# Patient Record
Sex: MALE | Race: WHITE | NOT HISPANIC OR LATINO
[De-identification: names, ages, dates, MRNs, and addresses within clinical notes are randomized per-mention and may not be internally consistent; named-entity substitution may affect disease eponyms.]

---

## 2018-12-31 ENCOUNTER — APPOINTMENT (OUTPATIENT)
Dept: PULMONOLOGY | Facility: CLINIC | Age: 53
End: 2018-12-31
Payer: MEDICAID

## 2018-12-31 ENCOUNTER — NON-APPOINTMENT (OUTPATIENT)
Age: 53
End: 2018-12-31

## 2018-12-31 VITALS
OXYGEN SATURATION: 98 % | WEIGHT: 305 LBS | HEART RATE: 70 BPM | TEMPERATURE: 98.4 F | DIASTOLIC BLOOD PRESSURE: 110 MMHG | SYSTOLIC BLOOD PRESSURE: 180 MMHG | HEIGHT: 69 IN | BODY MASS INDEX: 45.18 KG/M2

## 2018-12-31 PROBLEM — Z00.00 ENCOUNTER FOR PREVENTIVE HEALTH EXAMINATION: Status: ACTIVE | Noted: 2018-12-31

## 2018-12-31 PROCEDURE — 99205 OFFICE O/P NEW HI 60 MIN: CPT | Mod: 25

## 2018-12-31 PROCEDURE — 94010 BREATHING CAPACITY TEST: CPT

## 2018-12-31 PROCEDURE — 36415 COLL VENOUS BLD VENIPUNCTURE: CPT

## 2019-01-02 ENCOUNTER — NON-APPOINTMENT (OUTPATIENT)
Age: 54
End: 2019-01-02

## 2019-01-02 ENCOUNTER — APPOINTMENT (OUTPATIENT)
Dept: CARDIOLOGY | Facility: CLINIC | Age: 54
End: 2019-01-02
Payer: MEDICAID

## 2019-01-02 VITALS
DIASTOLIC BLOOD PRESSURE: 118 MMHG | BODY MASS INDEX: 46.65 KG/M2 | HEIGHT: 69 IN | OXYGEN SATURATION: 97 % | WEIGHT: 315 LBS | SYSTOLIC BLOOD PRESSURE: 182 MMHG | HEART RATE: 77 BPM

## 2019-01-02 VITALS — SYSTOLIC BLOOD PRESSURE: 157 MMHG | DIASTOLIC BLOOD PRESSURE: 118 MMHG

## 2019-01-02 DIAGNOSIS — Z56.0 UNEMPLOYMENT, UNSPECIFIED: ICD-10-CM

## 2019-01-02 DIAGNOSIS — Z86.39 PERSONAL HISTORY OF OTHER ENDOCRINE, NUTRITIONAL AND METABOLIC DISEASE: ICD-10-CM

## 2019-01-02 DIAGNOSIS — Z80.49 FAMILY HISTORY OF MALIGNANT NEOPLASM OF OTHER GENITAL ORGANS: ICD-10-CM

## 2019-01-02 DIAGNOSIS — F17.200 NICOTINE DEPENDENCE, UNSPECIFIED, UNCOMPLICATED: ICD-10-CM

## 2019-01-02 DIAGNOSIS — Z86.79 PERSONAL HISTORY OF OTHER DISEASES OF THE CIRCULATORY SYSTEM: ICD-10-CM

## 2019-01-02 LAB
ALBUMIN SERPL ELPH-MCNC: 4.2 G/DL
ALP BLD-CCNC: 131 U/L
ALT SERPL-CCNC: 25 U/L
ANION GAP SERPL CALC-SCNC: 11 MMOL/L
AST SERPL-CCNC: 23 U/L
BASOPHILS # BLD AUTO: 0.02 K/UL
BASOPHILS NFR BLD AUTO: 0.2 %
BILIRUB SERPL-MCNC: 0.9 MG/DL
BUN SERPL-MCNC: 15 MG/DL
CALCIUM SERPL-MCNC: 9.2 MG/DL
CHLORIDE SERPL-SCNC: 100 MMOL/L
CO2 SERPL-SCNC: 28 MMOL/L
CREAT SERPL-MCNC: 1.17 MG/DL
CRP SERPL-MCNC: 1.74 MG/DL
EOSINOPHIL # BLD AUTO: 0.26 K/UL
EOSINOPHIL NFR BLD AUTO: 2.1 %
GLUCOSE SERPL-MCNC: 199 MG/DL
HCT VFR BLD CALC: 42.8 %
HGB BLD-MCNC: 13.8 G/DL
IMM GRANULOCYTES NFR BLD AUTO: 0.4 %
LYMPHOCYTES # BLD AUTO: 1.53 K/UL
LYMPHOCYTES NFR BLD AUTO: 12.6 %
MAN DIFF?: NORMAL
MCHC RBC-ENTMCNC: 26.1 PG
MCHC RBC-ENTMCNC: 32.2 GM/DL
MCV RBC AUTO: 80.9 FL
MONOCYTES # BLD AUTO: 0.76 K/UL
MONOCYTES NFR BLD AUTO: 6.3 %
NEUTROPHILS # BLD AUTO: 9.5 K/UL
NEUTROPHILS NFR BLD AUTO: 78.4 %
NT-PROBNP SERPL-MCNC: 1332 PG/ML
PLATELET # BLD AUTO: 293 K/UL
POTASSIUM SERPL-SCNC: 4.3 MMOL/L
PROT SERPL-MCNC: 7.2 G/DL
RBC # BLD: 5.29 M/UL
RBC # FLD: 15.2 %
SODIUM SERPL-SCNC: 139 MMOL/L
WBC # FLD AUTO: 12.12 K/UL

## 2019-01-02 PROCEDURE — 93000 ELECTROCARDIOGRAM COMPLETE: CPT

## 2019-01-02 PROCEDURE — 99204 OFFICE O/P NEW MOD 45 MIN: CPT | Mod: 25

## 2019-01-02 RX ORDER — FUROSEMIDE 40 MG/1
40 TABLET ORAL DAILY
Qty: 30 | Refills: 6 | Status: ACTIVE | COMMUNITY
Start: 2019-01-02 | End: 1900-01-01

## 2019-01-02 RX ORDER — ATORVASTATIN CALCIUM 40 MG/1
40 TABLET, FILM COATED ORAL
Qty: 30 | Refills: 6 | Status: ACTIVE | COMMUNITY
Start: 2019-01-02 | End: 1900-01-01

## 2019-01-02 RX ORDER — HYDROCHLOROTHIAZIDE 12.5 MG/1
12.5 TABLET ORAL DAILY
Qty: 30 | Refills: 3 | Status: DISCONTINUED | COMMUNITY
Start: 2018-12-31 | End: 2019-01-02

## 2019-01-02 SDOH — ECONOMIC STABILITY - INCOME SECURITY: UNEMPLOYMENT, UNSPECIFIED: Z56.0

## 2019-01-02 NOTE — PHYSICAL EXAM
[General Appearance - Well Developed] : well developed [Normal Appearance] : normal appearance [Well Groomed] : well groomed [General Appearance - Well Nourished] : well nourished [General Appearance - In No Acute Distress] : no acute distress [Normal Conjunctiva] : the conjunctiva exhibited no abnormalities [Eyelids - No Xanthelasma] : the eyelids demonstrated no xanthelasmas [Normal Oral Mucosa] : normal oral mucosa [No Oral Pallor] : no oral pallor [No Oral Cyanosis] : no oral cyanosis [Heart Rate And Rhythm] : heart rate and rhythm were normal [Heart Sounds] : normal S1 and S2 [Murmurs] : no murmurs present [Respiration, Rhythm And Depth] : normal respiratory rhythm and effort [Exaggerated Use Of Accessory Muscles For Inspiration] : no accessory muscle use [Auscultation Breath Sounds / Voice Sounds] : lungs were clear to auscultation bilaterally [Bowel Sounds] : normal bowel sounds [Abdomen Soft] : soft [Abdomen Tenderness] : non-tender [Abnormal Walk] : normal gait [Gait - Sufficient For Exercise Testing] : the gait was sufficient for exercise testing [Nail Clubbing] : no clubbing of the fingernails [Cyanosis, Localized] : no localized cyanosis [Skin Turgor] : normal skin turgor [] : no rash [Impaired Insight] : insight and judgment were intact [Affect] : the affect was normal [Memory Recent] : recent memory was not impaired [FreeTextEntry1] : 2+ pitting edema bilateral lower extremities.

## 2019-01-02 NOTE — DISCUSSION/SUMMARY
[FreeTextEntry1] : 1. Decompensated Heart Failure: clinically patient has heart failure supported by physical examination, history, and elevated BNP level. I will d/c the HCTZ. I will prescribe Lasix 40mg daily. I will also prescribe Coreg 3.125mg BID. He should continue on his Enalapril 20mg BID. I will order an echocardiogram. If this reveals systolic dysfunction patient should undergo a diagnostic cardiac catheterization. \par \par 2. Hypertension: uncontrolled. Continue Enalapril 20mg BID. Will prescribe Coreg 3.125mg BID. This should be titrated up as tolerated. \par \par 3. Diabetes: appears uncontrolled. Encouraged regular follow up with PCP. Will start atorvastatin 40mg daily and Aspirin 81mg daily as patient is very high risk for underlying CAD.\par \par 4. Active Smoker: patient states one pack lasts 1-2 weeks. I spent 7 minutes educating the patient on the dangers of smoking. Continued smoking could lead to cancer, heart attack, stroke, and underlying lung disease. I educated him on the various ways of quitting (pharmacologic and non-pharmacologic). He states since he smokes very little each day he will try to quit cold turkey. I will follow his progress at each office visit.

## 2019-01-02 NOTE — REVIEW OF SYSTEMS
[Feeling Fatigued] : feeling fatigued [Shortness Of Breath] : shortness of breath [Dyspnea on exertion] : dyspnea during exertion [Lower Ext Edema] : lower extremity edema [Cough] : cough [Joint Pain] : joint pain [Joint Stiffness] : joint stiffness [Negative] : Heme/Lymph [Fever] : no fever [Chills] : no chills [Chest  Pressure] : no chest pressure [Chest Pain] : no chest pain [Palpitations] : no palpitations [Wheezing] : no wheezing

## 2019-01-02 NOTE — HISTORY OF PRESENT ILLNESS
[FreeTextEntry1] : This is a 53 year old smoker with history of apparently uncontrolled hypertension and diabetes comes for evaluation for leg swelling and SOB. Patient states the leg swelling has been present for many years. Over the past few months the swelling has been getting bad where he can't close his sneakers. Starting about 1 month ago patient has been having SOB. The SOB is worse with exertion and better with rest. Pulmonology, Dr. Reynolds, saw patient on 12/31 and started enalapril and HCTZ. Patient has been taking medications and states he has seen much improvement with SOB. He also suffers from orthopnea. No associated chest pain or palpitations.

## 2019-01-04 ENCOUNTER — APPOINTMENT (OUTPATIENT)
Dept: CARDIOLOGY | Facility: CLINIC | Age: 54
End: 2019-01-04
Payer: MEDICAID

## 2019-01-04 PROCEDURE — 93970 EXTREMITY STUDY: CPT

## 2019-01-04 PROCEDURE — 93306 TTE W/DOPPLER COMPLETE: CPT

## 2019-01-04 PROCEDURE — 93880 EXTRACRANIAL BILAT STUDY: CPT

## 2019-01-18 ENCOUNTER — APPOINTMENT (OUTPATIENT)
Dept: CARDIOLOGY | Facility: CLINIC | Age: 54
End: 2019-01-18
Payer: MEDICAID

## 2019-01-18 VITALS
WEIGHT: 315 LBS | BODY MASS INDEX: 46.65 KG/M2 | DIASTOLIC BLOOD PRESSURE: 94 MMHG | SYSTOLIC BLOOD PRESSURE: 144 MMHG | OXYGEN SATURATION: 95 % | HEIGHT: 69 IN | HEART RATE: 73 BPM

## 2019-01-18 DIAGNOSIS — R60.0 LOCALIZED EDEMA: ICD-10-CM

## 2019-01-18 PROCEDURE — 99215 OFFICE O/P EST HI 40 MIN: CPT | Mod: 25

## 2019-01-18 RX ORDER — METFORMIN HYDROCHLORIDE 1000 MG/1
1000 TABLET, EXTENDED RELEASE ORAL DAILY
Refills: 1 | Status: DISCONTINUED | COMMUNITY
Start: 2019-01-02 | End: 2019-01-18

## 2019-01-18 RX ORDER — CARVEDILOL 12.5 MG/1
12.5 TABLET, FILM COATED ORAL TWICE DAILY
Qty: 120 | Refills: 3 | Status: ACTIVE | COMMUNITY
Start: 2019-01-02 | End: 1900-01-01

## 2019-01-18 NOTE — HISTORY OF PRESENT ILLNESS
[FreeTextEntry1] : Historical HPI:\par This is a 53 year old smoker with history of apparently uncontrolled hypertension and diabetes comes for evaluation for leg swelling and SOB. Patient states the leg swelling has been present for many years. Over the past few months the swelling has been getting bad where he can't close his sneakers. Starting about 1 month ago patient has been having SOB. The SOB is worse with exertion and better with rest. Pulmonology, Dr. Reynolds, saw patient on 12/31 and started enalapril and HCTZ. Patient has been taking medications and states he has seen much improvement with SOB. He also suffers from orthopnea. No associated chest pain or palpitations. \par \par Today's HPI:\par Compliant with medications. Much less SOB. Able to lie flat at night without SOB. Legs are much less swollen.

## 2019-01-18 NOTE — PHYSICAL EXAM
[General Appearance - Well Developed] : well developed [Normal Appearance] : normal appearance [Well Groomed] : well groomed [General Appearance - Well Nourished] : well nourished [General Appearance - In No Acute Distress] : no acute distress [Normal Conjunctiva] : the conjunctiva exhibited no abnormalities [Eyelids - No Xanthelasma] : the eyelids demonstrated no xanthelasmas [Normal Oral Mucosa] : normal oral mucosa [No Oral Pallor] : no oral pallor [No Oral Cyanosis] : no oral cyanosis [Respiration, Rhythm And Depth] : normal respiratory rhythm and effort [Exaggerated Use Of Accessory Muscles For Inspiration] : no accessory muscle use [Auscultation Breath Sounds / Voice Sounds] : lungs were clear to auscultation bilaterally [Heart Rate And Rhythm] : heart rate and rhythm were normal [Heart Sounds] : normal S1 and S2 [Murmurs] : no murmurs present [Bowel Sounds] : normal bowel sounds [Abdomen Soft] : soft [Abdomen Tenderness] : non-tender [Abnormal Walk] : normal gait [Gait - Sufficient For Exercise Testing] : the gait was sufficient for exercise testing [Nail Clubbing] : no clubbing of the fingernails [Cyanosis, Localized] : no localized cyanosis [Skin Turgor] : normal skin turgor [] : no rash [Impaired Insight] : insight and judgment were intact [Affect] : the affect was normal [Memory Recent] : recent memory was not impaired [FreeTextEntry1] : 2+ pitting edema bilateral lower extremities.

## 2019-01-18 NOTE — DISCUSSION/SUMMARY
[FreeTextEntry1] : 1. Diastolic Heart Failure: likely from hypertensive heart disease. Goal is to continue current medical therapy. I will increase his Coreg to 12.5mg BID (high risk medication with no signs of toxicity) in an attempt to gain better control of his BP. He should continue PO Lasix and Enalapril. \par \par 2. Hypertension: better controlled. Continue Enalapril 20mg BID. Will increase Coreg to  12.5mg BID. Encouraged patient to maintain a low salt diet (<2grams/day) as this will aid in BP control and swelling.\par \par 3. Diabetes: appears uncontrolled. Encouraged regular follow up with PCP. I started atorvastatin 40mg daily and Aspirin 81mg daily as patient is very high risk for underlying CAD.\par \par 4. Active Smoker: patient states one pack lasts 1-2 weeks. Continued smoking could lead to cancer, heart attack, stroke, and underlying lung disease. I educated him on the various ways of quitting (pharmacologic and non-pharmacologic). He states since he smokes very little each day he will try to quit cold turkey. I will follow his progress at each office visit. \par \par 5. Lower Extremity Edema: secondary to Diastolic HF. Continue diuretic. Patient had venous dopplers done  in my office on 1/4/2019 which revealed no DVTs.\par \par I stress the importance of lifestyle modification with diet (particularly low salt) and exercise daily. I would like the patient to follow up with me in 3 months. At that time his BP should be better controlled and he should be less volume overloaded. Will order BNP and compare to one done 12/31/2018. At that time will order nuclear stress test to evaluate for ischemia.

## 2019-01-28 ENCOUNTER — APPOINTMENT (OUTPATIENT)
Dept: PULMONOLOGY | Facility: CLINIC | Age: 54
End: 2019-01-28

## 2019-04-19 ENCOUNTER — APPOINTMENT (OUTPATIENT)
Dept: CARDIOLOGY | Facility: CLINIC | Age: 54
End: 2019-04-19

## 2021-09-07 ENCOUNTER — APPOINTMENT (OUTPATIENT)
Dept: COLORECTAL SURGERY | Facility: CLINIC | Age: 56
End: 2021-09-07
Payer: MEDICAID

## 2021-09-07 VITALS
TEMPERATURE: 96 F | DIASTOLIC BLOOD PRESSURE: 98 MMHG | BODY MASS INDEX: 44.43 KG/M2 | SYSTOLIC BLOOD PRESSURE: 158 MMHG | HEART RATE: 62 BPM | RESPIRATION RATE: 16 BRPM | HEIGHT: 69 IN | WEIGHT: 300 LBS

## 2021-09-07 DIAGNOSIS — Z87.09 PERSONAL HISTORY OF OTHER DISEASES OF THE RESPIRATORY SYSTEM: ICD-10-CM

## 2021-09-07 DIAGNOSIS — Z86.59 PERSONAL HISTORY OF OTHER MENTAL AND BEHAVIORAL DISORDERS: ICD-10-CM

## 2021-09-07 PROCEDURE — 99204 OFFICE O/P NEW MOD 45 MIN: CPT

## 2021-09-13 PROBLEM — Z87.09 HISTORY OF ASTHMA: Status: RESOLVED | Noted: 2021-09-10 | Resolved: 2021-09-13

## 2021-09-13 PROBLEM — Z86.59 HISTORY OF ANXIETY: Status: RESOLVED | Noted: 2021-09-10 | Resolved: 2021-09-13

## 2021-09-13 RX ORDER — INSULIN LISPRO 100 U/ML
INJECTION, SOLUTION INTRAVENOUS; SUBCUTANEOUS
Refills: 0 | Status: ACTIVE | COMMUNITY

## 2021-09-13 RX ORDER — METFORMIN HYDROCHLORIDE 500 MG/1
500 TABLET, COATED ORAL
Refills: 0 | Status: ACTIVE | COMMUNITY

## 2021-09-13 RX ORDER — TAMSULOSIN HYDROCHLORIDE 0.4 MG/1
0.4 CAPSULE ORAL
Refills: 0 | Status: ACTIVE | COMMUNITY

## 2021-09-13 NOTE — REVIEW OF SYSTEMS
[Feeling Poorly] : feeling poorly [Feeling Tired] : feeling tired [Recent Weight Gain (___ Lbs)] : recent [unfilled] ~Ulb weight gain [As Noted in HPI] : as noted in HPI [Abdominal Pain] : abdominal pain [Negative] : Heme/Lymph

## 2021-09-14 NOTE — HISTORY OF PRESENT ILLNESS
[FreeTextEntry1] : consultation requested by from Dr. Pineda for complicated diverticulitis with abscess, and colostomy. 56 year old obese male with history of complicated diverticulitis for several years. patient had a large abscess which was eroding into bladder and prostate, per his report. underwent a diverting colostomy, but colon was not resected. he feels worse with pain

## 2021-09-14 NOTE — PHYSICAL EXAM
[Abdomen Tenderness] : ~T ~M Abdominal tenderness [Normal Breath Sounds] : Normal breath sounds [Normal Heart Sounds] : normal heart sounds [Normal Rate and Rhythm] : normal rate and rhythm [No Rash or Lesion] : No rash or lesion [Alert] : alert [Oriented to Person] : oriented to person [Oriented to Place] : oriented to place [Oriented to Time] : oriented to time [Calm] : calm [Abdomen Masses] : No abdominal masses [JVD] : no jugular venous distention  [de-identified] : morbidly obese, with colostomy llq [de-identified] : looks well, nad [de-identified] : hermelinda melton  [de-identified] : moves all 4

## 2021-09-14 NOTE — ASSESSMENT
[FreeTextEntry1] : 56 year old male with morbid obesity, and chronic complicated diverticultis. recommend repeat ct scan of abdomen and pelvis with po and iv contrast. recommend consultation with bariatric surgery for weight loss before colon surgery. encourage weight reduction. will request previous op reports

## 2021-09-24 ENCOUNTER — APPOINTMENT (OUTPATIENT)
Dept: CT IMAGING | Facility: CLINIC | Age: 56
End: 2021-09-24

## 2021-10-06 ENCOUNTER — NON-APPOINTMENT (OUTPATIENT)
Age: 56
End: 2021-10-06

## 2021-10-15 ENCOUNTER — APPOINTMENT (OUTPATIENT)
Dept: CARDIOLOGY | Facility: CLINIC | Age: 56
End: 2021-10-15
Payer: MEDICAID

## 2021-10-15 ENCOUNTER — NON-APPOINTMENT (OUTPATIENT)
Age: 56
End: 2021-10-15

## 2021-10-15 VITALS
WEIGHT: 315 LBS | SYSTOLIC BLOOD PRESSURE: 122 MMHG | HEIGHT: 69 IN | HEART RATE: 80 BPM | BODY MASS INDEX: 46.65 KG/M2 | OXYGEN SATURATION: 94 % | DIASTOLIC BLOOD PRESSURE: 84 MMHG

## 2021-10-15 DIAGNOSIS — Z79.899 OTHER LONG TERM (CURRENT) DRUG THERAPY: ICD-10-CM

## 2021-10-15 DIAGNOSIS — R94.31 ABNORMAL ELECTROCARDIOGRAM [ECG] [EKG]: ICD-10-CM

## 2021-10-15 DIAGNOSIS — R06.02 SHORTNESS OF BREATH: ICD-10-CM

## 2021-10-15 DIAGNOSIS — I10 ESSENTIAL (PRIMARY) HYPERTENSION: ICD-10-CM

## 2021-10-15 PROCEDURE — 93000 ELECTROCARDIOGRAM COMPLETE: CPT

## 2021-10-15 PROCEDURE — 99215 OFFICE O/P EST HI 40 MIN: CPT | Mod: 25

## 2021-10-15 RX ORDER — LISINOPRIL 40 MG/1
40 TABLET ORAL DAILY
Refills: 0 | Status: ACTIVE | COMMUNITY

## 2021-10-15 RX ORDER — ENALAPRIL MALEATE 20 MG/1
20 TABLET ORAL TWICE DAILY
Qty: 60 | Refills: 3 | Status: DISCONTINUED | COMMUNITY
Start: 2018-12-31 | End: 2021-10-15

## 2021-10-15 RX ORDER — ALBUTEROL SULFATE 90 UG/1
108 (90 BASE) AEROSOL, METERED RESPIRATORY (INHALATION)
Qty: 1 | Refills: 6 | Status: DISCONTINUED | COMMUNITY
Start: 2018-12-31 | End: 2021-10-15

## 2021-10-15 NOTE — REVIEW OF SYSTEMS
[Fever] : no fever [Chills] : no chills [Feeling Fatigued] : feeling fatigued [Joint Pain] : joint pain [Joint Stiffness] : joint stiffness [Negative] : Neurological [FreeTextEntry5] : see HPI [FreeTextEntry6] : see HPI [FreeTextEntry7] : see HPI

## 2021-10-15 NOTE — PHYSICAL EXAM
[Normal] : moves all extremities, no focal deficits, normal speech [de-identified] : No carotid bruits auscultated bilaterally [de-identified] : ambulates slowly [de-identified] : chronic lower extremity edema

## 2021-10-15 NOTE — DISCUSSION/SUMMARY
[FreeTextEntry1] : 1. Diastolic Heart Failure: likely from hypertensive heart disease. Goal is to continue current medical therapy. Continue Coreg to 12.5mg BID (high risk medication with no signs of toxicity), Lisinopril 40mg daily, and furosemide 40mg daily. Recommend follow up echocardiogram and pharmacologic nuclear stress testing to evaluate for ischemia prior to gastric sleeve procedure. \par \par 2. Hypertension: better controlled. Continue lisinopril 40mg daily and Coreg to 12.5mg BID. Encouraged patient to maintain a low salt diet (<2grams/day) as this will aid in BP control and swelling.\par \par 3. Diabetes: appears uncontrolled. Encouraged regular follow up with PCP. I started atorvastatin 40mg daily and Aspirin 81mg daily as patient is very high risk for underlying CAD.\par \par 4. Lower Extremity Edema: secondary to Diastolic HF. Continue diuretic. Patient had venous dopplers done in my office on 1/4/2019 which revealed no DVTs.\par \par Follow up after testing to discuss results and complete the clearance process.

## 2021-10-15 NOTE — CARDIOLOGY SUMMARY
[de-identified] : 10/15/2021, NSR with T wave inverions inferiorly. [de-identified] : 1/4/2019, Moderate-severe LVH, LV diastolic dysfunction with elevated filling pressures, Sclerotic AV with no stenosis or insufficiency, Mild MAC and mild mitral valve regurgitation. Mild TR, limited study to estimate PASP. LVEF 50%.

## 2021-10-15 NOTE — HISTORY OF PRESENT ILLNESS
[FreeTextEntry1] : Historical Perspective:\par 56 year old smoker with history of apparently uncontrolled hypertension and diabetes comes for evaluation for leg swelling and SOB. Patient states the leg swelling has been present for many years. Over the past few months the swelling has been getting bad where he can't close his sneakers. Starting about 1 month ago patient has been having SOB. The SOB is worse with exertion and better with rest. Pulmonology, Dr. Reynolds, saw patient on 12/31 and started enalapril and HCTZ. Patient has been taking medications and states he has seen much improvement with SOB. He also suffers from orthopnea. No associated chest pain or palpitations. \par \par Current Health Status:\par Last time I saw patient was in 2019. He ended up seeing another cardiologist, Dr. Geovanny Peace. He states the last time he saw him was over 1 year ago. Patient wants to re-establish care with me again because he wants to be within [facility]. Patient presents needing cardiac clearance prior to gastric sleeve procedure. He currently has colostomy bag and needs a reversal, however, according to patient, he needs to have the gastric sleeve and subsequent weight loss, prior. No date yet for surgery.  Patient does have exertional SOB, but no chest pain or pressure.

## 2021-10-20 ENCOUNTER — OUTPATIENT (OUTPATIENT)
Dept: OUTPATIENT SERVICES | Facility: HOSPITAL | Age: 56
LOS: 1 days | End: 2021-10-20
Payer: MEDICAID

## 2021-10-20 VITALS
HEART RATE: 61 BPM | OXYGEN SATURATION: 95 % | DIASTOLIC BLOOD PRESSURE: 96 MMHG | TEMPERATURE: 97 F | WEIGHT: 315 LBS | SYSTOLIC BLOOD PRESSURE: 136 MMHG | RESPIRATION RATE: 18 BRPM | HEIGHT: 69 IN

## 2021-10-20 DIAGNOSIS — I10 ESSENTIAL (PRIMARY) HYPERTENSION: ICD-10-CM

## 2021-10-20 DIAGNOSIS — Z12.11 ENCOUNTER FOR SCREENING FOR MALIGNANT NEOPLASM OF COLON: ICD-10-CM

## 2021-10-20 DIAGNOSIS — E11.9 TYPE 2 DIABETES MELLITUS WITHOUT COMPLICATIONS: ICD-10-CM

## 2021-10-20 DIAGNOSIS — E66.01 MORBID (SEVERE) OBESITY DUE TO EXCESS CALORIES: ICD-10-CM

## 2021-10-20 DIAGNOSIS — Z01.818 ENCOUNTER FOR OTHER PREPROCEDURAL EXAMINATION: ICD-10-CM

## 2021-10-20 DIAGNOSIS — U07.1 COVID-19: ICD-10-CM

## 2021-10-20 DIAGNOSIS — R94.31 ABNORMAL ELECTROCARDIOGRAM [ECG] [EKG]: ICD-10-CM

## 2021-10-20 DIAGNOSIS — Z93.3 COLOSTOMY STATUS: Chronic | ICD-10-CM

## 2021-10-20 LAB
A1C WITH ESTIMATED AVERAGE GLUCOSE RESULT: 11.4 % — HIGH (ref 4–5.6)
ANION GAP SERPL CALC-SCNC: 10 MMOL/L — SIGNIFICANT CHANGE UP (ref 5–17)
BASOPHILS # BLD AUTO: 0.04 K/UL — SIGNIFICANT CHANGE UP (ref 0–0.2)
BASOPHILS NFR BLD AUTO: 0.4 % — SIGNIFICANT CHANGE UP (ref 0–2)
BUN SERPL-MCNC: 15.5 MG/DL — SIGNIFICANT CHANGE UP (ref 8–20)
CALCIUM SERPL-MCNC: 9.2 MG/DL — SIGNIFICANT CHANGE UP (ref 8.6–10.2)
CHLORIDE SERPL-SCNC: 99 MMOL/L — SIGNIFICANT CHANGE UP (ref 98–107)
CO2 SERPL-SCNC: 27 MMOL/L — SIGNIFICANT CHANGE UP (ref 22–29)
CREAT SERPL-MCNC: 0.87 MG/DL — SIGNIFICANT CHANGE UP (ref 0.5–1.3)
EOSINOPHIL # BLD AUTO: 0.18 K/UL — SIGNIFICANT CHANGE UP (ref 0–0.5)
EOSINOPHIL NFR BLD AUTO: 1.8 % — SIGNIFICANT CHANGE UP (ref 0–6)
ESTIMATED AVERAGE GLUCOSE: 280 MG/DL — HIGH (ref 68–114)
GLUCOSE SERPL-MCNC: 264 MG/DL — HIGH (ref 70–99)
HCT VFR BLD CALC: 44.2 % — SIGNIFICANT CHANGE UP (ref 39–50)
HGB BLD-MCNC: 13.9 G/DL — SIGNIFICANT CHANGE UP (ref 13–17)
IMM GRANULOCYTES NFR BLD AUTO: 0.7 % — SIGNIFICANT CHANGE UP (ref 0–1.5)
LYMPHOCYTES # BLD AUTO: 1.18 K/UL — SIGNIFICANT CHANGE UP (ref 1–3.3)
LYMPHOCYTES # BLD AUTO: 11.6 % — LOW (ref 13–44)
MCHC RBC-ENTMCNC: 27 PG — SIGNIFICANT CHANGE UP (ref 27–34)
MCHC RBC-ENTMCNC: 31.4 GM/DL — LOW (ref 32–36)
MCV RBC AUTO: 86 FL — SIGNIFICANT CHANGE UP (ref 80–100)
MONOCYTES # BLD AUTO: 0.56 K/UL — SIGNIFICANT CHANGE UP (ref 0–0.9)
MONOCYTES NFR BLD AUTO: 5.5 % — SIGNIFICANT CHANGE UP (ref 2–14)
NEUTROPHILS # BLD AUTO: 8.13 K/UL — HIGH (ref 1.8–7.4)
NEUTROPHILS NFR BLD AUTO: 80 % — HIGH (ref 43–77)
PLATELET # BLD AUTO: 302 K/UL — SIGNIFICANT CHANGE UP (ref 150–400)
POTASSIUM SERPL-MCNC: 4.8 MMOL/L — SIGNIFICANT CHANGE UP (ref 3.5–5.3)
POTASSIUM SERPL-SCNC: 4.8 MMOL/L — SIGNIFICANT CHANGE UP (ref 3.5–5.3)
RBC # BLD: 5.14 M/UL — SIGNIFICANT CHANGE UP (ref 4.2–5.8)
RBC # FLD: 14.3 % — SIGNIFICANT CHANGE UP (ref 10.3–14.5)
SODIUM SERPL-SCNC: 136 MMOL/L — SIGNIFICANT CHANGE UP (ref 135–145)
WBC # BLD: 10.16 K/UL — SIGNIFICANT CHANGE UP (ref 3.8–10.5)
WBC # FLD AUTO: 10.16 K/UL — SIGNIFICANT CHANGE UP (ref 3.8–10.5)

## 2021-10-20 PROCEDURE — 80048 BASIC METABOLIC PNL TOTAL CA: CPT

## 2021-10-20 PROCEDURE — 85025 COMPLETE CBC W/AUTO DIFF WBC: CPT

## 2021-10-20 PROCEDURE — 71046 X-RAY EXAM CHEST 2 VIEWS: CPT | Mod: 26

## 2021-10-20 PROCEDURE — 36415 COLL VENOUS BLD VENIPUNCTURE: CPT

## 2021-10-20 PROCEDURE — 83036 HEMOGLOBIN GLYCOSYLATED A1C: CPT

## 2021-10-20 PROCEDURE — 71046 X-RAY EXAM CHEST 2 VIEWS: CPT

## 2021-10-20 PROCEDURE — G0463: CPT

## 2021-10-20 NOTE — H&P PST ADULT - GASTROINTESTINAL COMMENTS
left colostomy left colostomy bag intact liquid brown stool noted skin intact no redness surrounding bag obese left colostomy bag intact skin in good condition , liquid brown stool noted in bag

## 2021-10-20 NOTE — H&P PST ADULT - ASSESSMENT
56 yr old obese male presents with c/o colostomy  bag ,  2021 surgery done  for diverticulitis. Pt has never had a colonoscopy , denies abdominal pain , nausea, vomiting or bleeding , no know history of colon cancer with family. PT  would like to have bag reversed and wants gastric sleeve procedure for weight loss in near future. Pt has diabetes, htn ,abn ekg ,  swelling to dc legs and diastolic heart failure as per cardiology. Pt c/o dyspnea that has been improving but  leg swelling persists. will obtain a medical clearance with DR. Martinez testing needed and rom ( [facility] office ) aware that procedure may be postponed, medical clearance and pre op pcr to be obtained .   OPIOID RISK TOOL    PAYTON EACH BOX THAT APPLIES AND ADD TOTALS AT THE END    FAMILY HISTORY OF SUBSTANCE ABUSE                 FEMALE         MALE                                                Alcohol                             [  ]1 pt          [  ]3pts                                               Illegal Durgs                     [  ]2 pts        [  ]3pts                                               Rx Drugs                           [  ]4 pts        [  ]4 pts    PERSONAL HISTORY OF SUBSTANCE ABUSE                                                                                          Alcohol                             [  ]3 pts       [  ]3 pts                                               Illegal Durgs                     [  ]4 pts        [  ]4 pts                                               Rx Drugs                           [  ]5 pts        [  ]5 pts    AGE BETWEEN 16-45 YEARS                                      [  ]1 pt         [  ]1 pt    HISTORY OF PREADOLESCENT   SEXUAL ABUSE                                                             [  ]3 pts        [  ]0pts    PSYCHOLOGICAL DISEASE                     ADD, OCD, Bipolar, Schizophrenia        [  ]2 pts         [  ]2 pts                      Depression                                               [  ]1 pt           [  ]1 pt           SCORING TOTAL   (add numbers and type here)              (*0**)                                     A score of 3 or lower indicated LOW risk for future opiod abuse  A score of 4 to 7 indicated moderate risk for future opiod abuse  A score of 8 or higher indicates a high risk for opiod abuse  CAPRINI VTE 2.0 SCORE [CLOT updated 2019]    AGE RELATED RISK FACTORS                                                       MOBILITY RELATED FACTORS  [X ] Age 41-60 years                                            (1 Point)                    [ ] Bed rest                                                        (1 Point)  [ ] Age: 61-74 years                                           (2 Points)                  [ ] Plaster cast                                                   (2 Points)  [ ] Age= 75 years                                              (3 Points)                    [ ] Bed bound for more than 72 hours                 (2 Points)    DISEASE RELATED RISK FACTORS                                               GENDER SPECIFIC FACTORS  [X ] Edema in the lower extremities                       (1 Point)              [ ] Pregnancy                                                     (1 Point)  [ ] Varicose veins                                               (1 Point)                     [ ] Post-partum < 6 weeks                                   (1 Point)             [ x] BMI > 25 Kg/m2                                            (1 Point)                     [ ] Hormonal therapy  or oral contraception          (1 Point)                 [ ] Sepsis (in the previous month)                        (1 Point)               [ ] History of pregnancy complications                 (1 point)  [ ] Pneumonia or serious lung disease                                               [ ] Unexplained or recurrent                     (1 Point)           (in the previous month)                               (1 Point)  [ ] Abnormal pulmonary function test                     (1 Point)                 SURGERY RELATED RISK FACTORS  [ ] Acute myocardial infarction                              (1 Point)               [ ]  Section                                             (1 Point)  [ ] Congestive heart failure (in the previous month)  (1 Point)      [ X] Minor surgery                                                  (1 Point)   [ ] Inflammatory bowel disease                             (1 Point)               [ ] Arthroscopic surgery                                        (2 Points)  [ ] Central venous access                                      (2 Points)                [ ] General surgery lasting more than 45 minutes (2 points)  [ ] Malignancy- Present or previous                   (2 Points)                [ ] Elective arthroplasty                                         (5 points)    [ ] Stroke (in the previous month)                          (5 Points)                                                                                                                                                           HEMATOLOGY RELATED FACTORS                                                 TRAUMA RELATED RISK FACTORS  [ ] Prior episodes of VTE                                     (3 Points)                [ ] Fracture of the hip, pelvis, or leg                       (5 Points)  [ ] Positive family history for VTE                         (3 Points)             [ ] Acute spinal cord injury (in the previous month)  (5 Points)  [ ] Prothrombin 75608 A                                     (3 Points)               [ ] Paralysis  (less than 1 month)                             (5 Points)  [ ] Factor V Leiden                                             (3 Points)                  [ ] Multiple Trauma within 1 month                        (5 Points)  [ ] Lupus anticoagulants                                     (3 Points)                                                           [ ] Anticardiolipin antibodies                               (3 Points)                                                       [ ] High homocysteine in the blood                      (3 Points)                                             [ ] Other congenital or acquired thrombophilia      (3 Points)                                                [ ] Heparin induced thrombocytopenia                  (3 Points)                                     Total Score [    4      ]

## 2021-10-20 NOTE — H&P PST ADULT - NSANTHOSAYNRD_GEN_A_CORE
No. RAFFI screening performed.  STOP BANG Legend: 0-2 = LOW Risk; 3-4 = INTERMEDIATE Risk; 5-8 = HIGH Risk

## 2021-10-20 NOTE — H&P PST ADULT - NSICDXPASTMEDICALHX_GEN_ALL_CORE_FT
PAST MEDICAL HISTORY:  Diabetes     Diverticulitis     HTN (hypertension)      PAST MEDICAL HISTORY:  Diabetes     Diverticulitis     HTN (hypertension)     Obese     S/P colostomy left

## 2021-10-20 NOTE — H&P PST ADULT - LAB RESULTS AND INTERPRETATION
hgb a1c  11.4  glucose 264 results faxed to rom/ Dr. Garcia  10/20/21 k damico NP hgb a1c  11.4  glucose 264 results faxed to rom/ Dr. Garcia  10/20/21 k damico NP  Results faxed to Dr. Kingston k damico NP 10/21 22 2:30 pm

## 2021-10-20 NOTE — H&P PST ADULT - EXTREMITIES COMMENTS
mild edema dc lower legs pedal pulses intact dc, skin discolored darker to lower legs no redness non tender

## 2021-10-20 NOTE — H&P PST ADULT - HISTORY OF PRESENT ILLNESS
56 yr old obese male presents with history of diabetes, htn , abnormal ekg , REED and diverticulitis s/p left colostomy July 2021,  Pt plans to have future bariatric surgery for weight loss and closure of left colostomy . Pt states he never had a colonoscopy and is due for one . Denies any nausea, vomiting , bleeding or constipation , denies family history colon cancer. Denies abdominal pain today had pain in jUne / July 2021 prior to colostomy .  56 yr old obese male presents with history of diabetes, htn ,diastolic heart failure , leg swelling , abnormal ekg , REED and diverticulitis s/p left colostomy July 2021,  Pt plans to have future bariatric surgery for weight loss and closure of left colostomy . Pt states he never had a colonoscopy and is due for one  due to BMI 49 pt to have procedure in hospital. . Denies any nausea, vomiting , bleeding or constipation , denies family history colon cancer. Denies abdominal pain today had pain in jUne / July 2021 with abcess of diverticulum  prior to colostomy. .

## 2021-10-20 NOTE — H&P PST ADULT - OTHER CARE PROVIDERS
Abdomen soft, nontender, nondistended, bowel sounds present in all 4 quadrants. DR. Martinez DR. Martinez     DR. Pineda  [phone]

## 2021-10-20 NOTE — H&P PST ADULT - EKG AND INTERPRETATION
sinus rhythm 71 t wave abnormality sinus rhythm 71 t wave abnormality  done 10/15/21 at cardiology office

## 2021-10-22 ENCOUNTER — APPOINTMENT (OUTPATIENT)
Dept: DISASTER EMERGENCY | Facility: CLINIC | Age: 56
End: 2021-10-22

## 2021-10-22 DIAGNOSIS — Z01.818 ENCOUNTER FOR OTHER PREPROCEDURAL EXAMINATION: ICD-10-CM

## 2021-10-22 PROBLEM — I10 ESSENTIAL (PRIMARY) HYPERTENSION: Chronic | Status: ACTIVE | Noted: 2021-10-20

## 2021-10-22 PROBLEM — K57.92 DIVERTICULITIS OF INTESTINE, PART UNSPECIFIED, WITHOUT PERFORATION OR ABSCESS WITHOUT BLEEDING: Chronic | Status: ACTIVE | Noted: 2021-10-20

## 2021-10-22 PROBLEM — E11.9 TYPE 2 DIABETES MELLITUS WITHOUT COMPLICATIONS: Chronic | Status: ACTIVE | Noted: 2021-10-20

## 2021-10-23 ENCOUNTER — APPOINTMENT (OUTPATIENT)
Dept: CT IMAGING | Facility: CLINIC | Age: 56
End: 2021-10-23

## 2021-11-02 ENCOUNTER — OUTPATIENT (OUTPATIENT)
Dept: OUTPATIENT SERVICES | Facility: HOSPITAL | Age: 56
LOS: 1 days | End: 2021-11-02
Payer: MEDICAID

## 2021-11-02 ENCOUNTER — APPOINTMENT (OUTPATIENT)
Dept: CT IMAGING | Facility: CLINIC | Age: 56
End: 2021-11-02

## 2021-11-02 DIAGNOSIS — Z93.3 COLOSTOMY STATUS: Chronic | ICD-10-CM

## 2021-11-02 DIAGNOSIS — K57.32 DIVERTICULITIS OF LARGE INTESTINE WITHOUT PERFORATION OR ABSCESS WITHOUT BLEEDING: ICD-10-CM

## 2021-11-02 PROCEDURE — 74177 CT ABD & PELVIS W/CONTRAST: CPT

## 2021-11-03 ENCOUNTER — NON-APPOINTMENT (OUTPATIENT)
Age: 56
End: 2021-11-03

## 2021-11-04 ENCOUNTER — APPOINTMENT (OUTPATIENT)
Dept: CARDIOLOGY | Facility: CLINIC | Age: 56
End: 2021-11-04

## 2021-11-05 ENCOUNTER — APPOINTMENT (OUTPATIENT)
Dept: COLORECTAL SURGERY | Facility: CLINIC | Age: 56
End: 2021-11-05
Payer: MEDICAID

## 2021-11-05 VITALS — WEIGHT: 315 LBS | HEIGHT: 69 IN | BODY MASS INDEX: 46.65 KG/M2

## 2021-11-05 PROCEDURE — 99214 OFFICE O/P EST MOD 30 MIN: CPT

## 2021-11-05 NOTE — HISTORY OF PRESENT ILLNESS
[FreeTextEntry1] : 56 year old obese male with history of complicated diverticulitis for several years. patient had a large abscess which was eroding into bladder and prostate, per his report. underwent a diverting colostomy, but colon was not resected. he feels worse with pain.\par He was scheduled for a colonoscopy however could not get medical clearance due to significant hemoglobin A1c level

## 2021-11-05 NOTE — ASSESSMENT
[FreeTextEntry1] : 56 year old male with morbid obesity, and chronic complicated diverticultis. recommend repeat ct scan of abdomen and pelvis with po and iv contrast. recommend consultation with bariatric surgery for weight loss before colon surgery. encourage weight reduction. will request previous op reports.\par Will need to be admitted to the hospital undergo adequate cardiac clearance colonoscopy imaging studies

## 2021-11-05 NOTE — PHYSICAL EXAM
[Abdomen Masses] : No abdominal masses [Abdomen Tenderness] : ~T ~M Abdominal tenderness [JVD] : no jugular venous distention  [Normal Breath Sounds] : Normal breath sounds [Normal Heart Sounds] : normal heart sounds [Normal Rate and Rhythm] : normal rate and rhythm [No Rash or Lesion] : No rash or lesion [Alert] : alert [Oriented to Person] : oriented to person [Oriented to Place] : oriented to place [Oriented to Time] : oriented to time [Calm] : calm [de-identified] : morbidly obese, with colostomy llq [de-identified] : looks well, nad [de-identified] : hermelinda melton  [de-identified] : moves all 4

## 2021-11-08 ENCOUNTER — INPATIENT (INPATIENT)
Facility: HOSPITAL | Age: 56
LOS: 6 days | Discharge: HOME | DRG: 221 | End: 2021-11-15
Attending: COLON & RECTAL SURGERY | Admitting: COLON & RECTAL SURGERY
Payer: MEDICAID

## 2021-11-08 VITALS — WEIGHT: 315 LBS | HEIGHT: 69 IN

## 2021-11-08 DIAGNOSIS — K57.92 DIVERTICULITIS OF INTESTINE, PART UNSPECIFIED, WITHOUT PERFORATION OR ABSCESS WITHOUT BLEEDING: ICD-10-CM

## 2021-11-08 DIAGNOSIS — Z93.3 COLOSTOMY STATUS: Chronic | ICD-10-CM

## 2021-11-08 DIAGNOSIS — Z01.818 ENCOUNTER FOR OTHER PREPROCEDURAL EXAMINATION: ICD-10-CM

## 2021-11-08 LAB
ALBUMIN SERPL ELPH-MCNC: 2.8 G/DL — LOW (ref 3.3–5)
ALP SERPL-CCNC: 126 U/L — HIGH (ref 40–120)
ALT FLD-CCNC: 34 U/L — SIGNIFICANT CHANGE UP (ref 12–78)
ANION GAP SERPL CALC-SCNC: 5 MMOL/L — SIGNIFICANT CHANGE UP (ref 5–17)
APPEARANCE UR: CLEAR — SIGNIFICANT CHANGE UP
AST SERPL-CCNC: 22 U/L — SIGNIFICANT CHANGE UP (ref 15–37)
BASOPHILS # BLD AUTO: 0.04 K/UL — SIGNIFICANT CHANGE UP (ref 0–0.2)
BASOPHILS NFR BLD AUTO: 0.4 % — SIGNIFICANT CHANGE UP (ref 0–2)
BILIRUB SERPL-MCNC: 0.4 MG/DL — SIGNIFICANT CHANGE UP (ref 0.2–1.2)
BILIRUB UR-MCNC: NEGATIVE — SIGNIFICANT CHANGE UP
BUN SERPL-MCNC: 13 MG/DL — SIGNIFICANT CHANGE UP (ref 7–23)
CALCIUM SERPL-MCNC: 8.7 MG/DL — SIGNIFICANT CHANGE UP (ref 8.5–10.1)
CHLORIDE SERPL-SCNC: 100 MMOL/L — SIGNIFICANT CHANGE UP (ref 96–108)
CO2 SERPL-SCNC: 31 MMOL/L — SIGNIFICANT CHANGE UP (ref 22–31)
COLOR SPEC: YELLOW — SIGNIFICANT CHANGE UP
CREAT SERPL-MCNC: 1.1 MG/DL — SIGNIFICANT CHANGE UP (ref 0.5–1.3)
DIFF PNL FLD: ABNORMAL
EOSINOPHIL # BLD AUTO: 0.15 K/UL — SIGNIFICANT CHANGE UP (ref 0–0.5)
EOSINOPHIL NFR BLD AUTO: 1.4 % — SIGNIFICANT CHANGE UP (ref 0–6)
FLUAV AG NPH QL: SIGNIFICANT CHANGE UP
FLUBV AG NPH QL: SIGNIFICANT CHANGE UP
GLUCOSE SERPL-MCNC: 336 MG/DL — HIGH (ref 70–99)
GLUCOSE UR QL: 1000 MG/DL
HCT VFR BLD CALC: 41.6 % — SIGNIFICANT CHANGE UP (ref 39–50)
HGB BLD-MCNC: 13 G/DL — SIGNIFICANT CHANGE UP (ref 13–17)
IMM GRANULOCYTES NFR BLD AUTO: 0.6 % — SIGNIFICANT CHANGE UP (ref 0–1.5)
KETONES UR-MCNC: ABNORMAL
LACTATE SERPL-SCNC: 1.6 MMOL/L — SIGNIFICANT CHANGE UP (ref 0.7–2)
LEUKOCYTE ESTERASE UR-ACNC: NEGATIVE — SIGNIFICANT CHANGE UP
LIDOCAIN IGE QN: 76 U/L — SIGNIFICANT CHANGE UP (ref 73–393)
LYMPHOCYTES # BLD AUTO: 0.9 K/UL — LOW (ref 1–3.3)
LYMPHOCYTES # BLD AUTO: 8.3 % — LOW (ref 13–44)
MCHC RBC-ENTMCNC: 26.9 PG — LOW (ref 27–34)
MCHC RBC-ENTMCNC: 31.3 GM/DL — LOW (ref 32–36)
MCV RBC AUTO: 86 FL — SIGNIFICANT CHANGE UP (ref 80–100)
MONOCYTES # BLD AUTO: 0.68 K/UL — SIGNIFICANT CHANGE UP (ref 0–0.9)
MONOCYTES NFR BLD AUTO: 6.3 % — SIGNIFICANT CHANGE UP (ref 2–14)
NEUTROPHILS # BLD AUTO: 8.95 K/UL — HIGH (ref 1.8–7.4)
NEUTROPHILS NFR BLD AUTO: 83 % — HIGH (ref 43–77)
NITRITE UR-MCNC: NEGATIVE — SIGNIFICANT CHANGE UP
PH UR: 6 — SIGNIFICANT CHANGE UP (ref 5–8)
PLATELET # BLD AUTO: 311 K/UL — SIGNIFICANT CHANGE UP (ref 150–400)
POTASSIUM SERPL-MCNC: 4.4 MMOL/L — SIGNIFICANT CHANGE UP (ref 3.5–5.3)
POTASSIUM SERPL-SCNC: 4.4 MMOL/L — SIGNIFICANT CHANGE UP (ref 3.5–5.3)
PROT SERPL-MCNC: 7.2 GM/DL — SIGNIFICANT CHANGE UP (ref 6–8.3)
PROT UR-MCNC: 100 MG/DL
RBC # BLD: 4.84 M/UL — SIGNIFICANT CHANGE UP (ref 4.2–5.8)
RBC # FLD: 14.2 % — SIGNIFICANT CHANGE UP (ref 10.3–14.5)
RSV RNA NPH QL NAA+NON-PROBE: SIGNIFICANT CHANGE UP
SARS-COV-2 RNA SPEC QL NAA+PROBE: SIGNIFICANT CHANGE UP
SODIUM SERPL-SCNC: 136 MMOL/L — SIGNIFICANT CHANGE UP (ref 135–145)
SP GR SPEC: 1.02 — SIGNIFICANT CHANGE UP (ref 1.01–1.02)
UROBILINOGEN FLD QL: NEGATIVE MG/DL — SIGNIFICANT CHANGE UP
WBC # BLD: 10.78 K/UL — HIGH (ref 3.8–10.5)
WBC # FLD AUTO: 10.78 K/UL — HIGH (ref 3.8–10.5)

## 2021-11-08 PROCEDURE — C1889: CPT

## 2021-11-08 PROCEDURE — 82378 CARCINOEMBRYONIC ANTIGEN: CPT

## 2021-11-08 PROCEDURE — C1758: CPT

## 2021-11-08 PROCEDURE — 84134 ASSAY OF PREALBUMIN: CPT

## 2021-11-08 PROCEDURE — 88305 TISSUE EXAM BY PATHOLOGIST: CPT

## 2021-11-08 PROCEDURE — C9399: CPT

## 2021-11-08 PROCEDURE — 97116 GAIT TRAINING THERAPY: CPT | Mod: GP

## 2021-11-08 PROCEDURE — 94660 CPAP INITIATION&MGMT: CPT

## 2021-11-08 PROCEDURE — 76000 FLUOROSCOPY <1 HR PHYS/QHP: CPT

## 2021-11-08 PROCEDURE — U0003: CPT

## 2021-11-08 PROCEDURE — 86769 SARS-COV-2 COVID-19 ANTIBODY: CPT

## 2021-11-08 PROCEDURE — 99222 1ST HOSP IP/OBS MODERATE 55: CPT

## 2021-11-08 PROCEDURE — 88307 TISSUE EXAM BY PATHOLOGIST: CPT

## 2021-11-08 PROCEDURE — 78452 HT MUSCLE IMAGE SPECT MULT: CPT

## 2021-11-08 PROCEDURE — 88304 TISSUE EXAM BY PATHOLOGIST: CPT

## 2021-11-08 PROCEDURE — A9500: CPT

## 2021-11-08 PROCEDURE — 36415 COLL VENOUS BLD VENIPUNCTURE: CPT

## 2021-11-08 PROCEDURE — 86923 COMPATIBILITY TEST ELECTRIC: CPT

## 2021-11-08 PROCEDURE — 85025 COMPLETE CBC W/AUTO DIFF WBC: CPT

## 2021-11-08 PROCEDURE — 93306 TTE W/DOPPLER COMPLETE: CPT

## 2021-11-08 PROCEDURE — 80053 COMPREHEN METABOLIC PANEL: CPT

## 2021-11-08 PROCEDURE — 84100 ASSAY OF PHOSPHORUS: CPT

## 2021-11-08 PROCEDURE — 74177 CT ABD & PELVIS W/CONTRAST: CPT | Mod: MA

## 2021-11-08 PROCEDURE — 74177 CT ABD & PELVIS W/CONTRAST: CPT | Mod: 26

## 2021-11-08 PROCEDURE — 86900 BLOOD TYPING SEROLOGIC ABO: CPT

## 2021-11-08 PROCEDURE — U0005: CPT

## 2021-11-08 PROCEDURE — 87075 CULTR BACTERIA EXCEPT BLOOD: CPT

## 2021-11-08 PROCEDURE — 85610 PROTHROMBIN TIME: CPT

## 2021-11-08 PROCEDURE — 93017 CV STRESS TEST TRACING ONLY: CPT

## 2021-11-08 PROCEDURE — 87070 CULTURE OTHR SPECIMN AEROBIC: CPT

## 2021-11-08 PROCEDURE — 83735 ASSAY OF MAGNESIUM: CPT

## 2021-11-08 PROCEDURE — 85730 THROMBOPLASTIN TIME PARTIAL: CPT

## 2021-11-08 PROCEDURE — 82962 GLUCOSE BLOOD TEST: CPT

## 2021-11-08 PROCEDURE — C9113: CPT

## 2021-11-08 PROCEDURE — 87077 CULTURE AEROBIC IDENTIFY: CPT

## 2021-11-08 PROCEDURE — 99223 1ST HOSP IP/OBS HIGH 75: CPT

## 2021-11-08 PROCEDURE — 86850 RBC ANTIBODY SCREEN: CPT

## 2021-11-08 PROCEDURE — 87186 SC STD MICRODIL/AGAR DIL: CPT

## 2021-11-08 PROCEDURE — 99285 EMERGENCY DEPT VISIT HI MDM: CPT

## 2021-11-08 PROCEDURE — 87102 FUNGUS ISOLATION CULTURE: CPT

## 2021-11-08 PROCEDURE — 87116 MYCOBACTERIA CULTURE: CPT

## 2021-11-08 PROCEDURE — 86901 BLOOD TYPING SEROLOGIC RH(D): CPT

## 2021-11-08 PROCEDURE — 84132 ASSAY OF SERUM POTASSIUM: CPT

## 2021-11-08 PROCEDURE — C1769: CPT

## 2021-11-08 PROCEDURE — 71045 X-RAY EXAM CHEST 1 VIEW: CPT

## 2021-11-08 PROCEDURE — 97161 PT EVAL LOW COMPLEX 20 MIN: CPT | Mod: GP

## 2021-11-08 PROCEDURE — C1765: CPT

## 2021-11-08 PROCEDURE — 80048 BASIC METABOLIC PNL TOTAL CA: CPT

## 2021-11-08 PROCEDURE — 85027 COMPLETE CBC AUTOMATED: CPT

## 2021-11-08 RX ORDER — ALPRAZOLAM 0.25 MG
0.5 TABLET ORAL ONCE
Refills: 0 | Status: DISCONTINUED | OUTPATIENT
Start: 2021-11-08 | End: 2021-11-08

## 2021-11-08 RX ORDER — MORPHINE SULFATE 50 MG/1
4 CAPSULE, EXTENDED RELEASE ORAL ONCE
Refills: 0 | Status: DISCONTINUED | OUTPATIENT
Start: 2021-11-08 | End: 2021-11-11

## 2021-11-08 RX ORDER — METFORMIN HYDROCHLORIDE 850 MG/1
0 TABLET ORAL
Qty: 0 | Refills: 0 | DISCHARGE

## 2021-11-08 RX ORDER — ATORVASTATIN CALCIUM 80 MG/1
1 TABLET, FILM COATED ORAL
Qty: 0 | Refills: 0 | DISCHARGE

## 2021-11-08 RX ORDER — TAMSULOSIN HYDROCHLORIDE 0.4 MG/1
0.4 CAPSULE ORAL AT BEDTIME
Refills: 0 | Status: ACTIVE | OUTPATIENT
Start: 2021-11-08 | End: 2022-10-07

## 2021-11-08 RX ORDER — DEXTROSE 50 % IN WATER 50 %
15 SYRINGE (ML) INTRAVENOUS ONCE
Refills: 0 | Status: ACTIVE | OUTPATIENT
Start: 2021-11-08 | End: 2022-10-07

## 2021-11-08 RX ORDER — FUROSEMIDE 40 MG
20 TABLET ORAL DAILY
Refills: 0 | Status: DISCONTINUED | OUTPATIENT
Start: 2021-11-08 | End: 2021-11-12

## 2021-11-08 RX ORDER — SODIUM CHLORIDE 9 MG/ML
1000 INJECTION, SOLUTION INTRAVENOUS
Refills: 0 | Status: ACTIVE | OUTPATIENT
Start: 2021-11-08 | End: 2022-10-07

## 2021-11-08 RX ORDER — CARVEDILOL PHOSPHATE 80 MG/1
0 CAPSULE, EXTENDED RELEASE ORAL
Qty: 0 | Refills: 0 | DISCHARGE

## 2021-11-08 RX ORDER — TRAZODONE HCL 50 MG
50 TABLET ORAL AT BEDTIME
Refills: 0 | Status: ACTIVE | OUTPATIENT
Start: 2021-11-08 | End: 2021-11-11

## 2021-11-08 RX ORDER — KETOROLAC TROMETHAMINE 30 MG/ML
15 SYRINGE (ML) INJECTION ONCE
Refills: 0 | Status: DISCONTINUED | OUTPATIENT
Start: 2021-11-08 | End: 2021-11-11

## 2021-11-08 RX ORDER — METFORMIN HYDROCHLORIDE 850 MG/1
1 TABLET ORAL
Qty: 0 | Refills: 0 | DISCHARGE

## 2021-11-08 RX ORDER — DEXTROSE 50 % IN WATER 50 %
25 SYRINGE (ML) INTRAVENOUS ONCE
Refills: 0 | Status: ACTIVE | OUTPATIENT
Start: 2021-11-08

## 2021-11-08 RX ORDER — ACETAMINOPHEN 500 MG
650 TABLET ORAL EVERY 6 HOURS
Refills: 0 | Status: ACTIVE | OUTPATIENT
Start: 2021-11-08 | End: 2022-10-07

## 2021-11-08 RX ORDER — INFLUENZA VIRUS VACCINE 15; 15; 15; 15 UG/.5ML; UG/.5ML; UG/.5ML; UG/.5ML
0.5 SUSPENSION INTRAMUSCULAR ONCE
Refills: 0 | Status: ACTIVE | OUTPATIENT
Start: 2021-11-08 | End: 2022-10-07

## 2021-11-08 RX ORDER — CARVEDILOL PHOSPHATE 80 MG/1
12.5 CAPSULE, EXTENDED RELEASE ORAL EVERY 12 HOURS
Refills: 0 | Status: DISCONTINUED | OUTPATIENT
Start: 2021-11-08 | End: 2021-11-14

## 2021-11-08 RX ORDER — LISINOPRIL 2.5 MG/1
20 TABLET ORAL ONCE
Refills: 0 | Status: COMPLETED | OUTPATIENT
Start: 2021-11-08 | End: 2021-11-08

## 2021-11-08 RX ORDER — ENOXAPARIN SODIUM 100 MG/ML
40 INJECTION SUBCUTANEOUS EVERY 12 HOURS
Refills: 0 | Status: DISCONTINUED | OUTPATIENT
Start: 2021-11-08 | End: 2021-11-12

## 2021-11-08 RX ORDER — SODIUM CHLORIDE 9 MG/ML
1000 INJECTION, SOLUTION INTRAVENOUS
Refills: 0 | Status: DISCONTINUED | OUTPATIENT
Start: 2021-11-08 | End: 2021-11-09

## 2021-11-08 RX ORDER — ALBUTEROL 90 UG/1
2 AEROSOL, METERED ORAL EVERY 6 HOURS
Refills: 0 | Status: ACTIVE | OUTPATIENT
Start: 2021-11-08 | End: 2022-10-07

## 2021-11-08 RX ORDER — DEXTROSE 50 % IN WATER 50 %
12.5 SYRINGE (ML) INTRAVENOUS ONCE
Refills: 0 | Status: ACTIVE | OUTPATIENT
Start: 2021-11-08

## 2021-11-08 RX ORDER — ASPIRIN/CALCIUM CARB/MAGNESIUM 324 MG
81 TABLET ORAL ONCE
Refills: 0 | Status: COMPLETED | OUTPATIENT
Start: 2021-11-08 | End: 2021-11-08

## 2021-11-08 RX ORDER — PIPERACILLIN AND TAZOBACTAM 4; .5 G/20ML; G/20ML
3.38 INJECTION, POWDER, LYOPHILIZED, FOR SOLUTION INTRAVENOUS ONCE
Refills: 0 | Status: COMPLETED | OUTPATIENT
Start: 2021-11-08 | End: 2021-11-08

## 2021-11-08 RX ORDER — GLUCAGON INJECTION, SOLUTION 0.5 MG/.1ML
1 INJECTION, SOLUTION SUBCUTANEOUS ONCE
Refills: 0 | Status: ACTIVE | OUTPATIENT
Start: 2021-11-08 | End: 2022-10-07

## 2021-11-08 RX ORDER — CARVEDILOL PHOSPHATE 80 MG/1
12.5 CAPSULE, EXTENDED RELEASE ORAL ONCE
Refills: 0 | Status: COMPLETED | OUTPATIENT
Start: 2021-11-08 | End: 2021-11-08

## 2021-11-08 RX ORDER — INSULIN GLARGINE 100 [IU]/ML
30 INJECTION, SOLUTION SUBCUTANEOUS AT BEDTIME
Refills: 0 | Status: DISCONTINUED | OUTPATIENT
Start: 2021-11-08 | End: 2021-11-09

## 2021-11-08 RX ORDER — PANTOPRAZOLE SODIUM 20 MG/1
40 TABLET, DELAYED RELEASE ORAL DAILY
Refills: 0 | Status: DISCONTINUED | OUTPATIENT
Start: 2021-11-08 | End: 2021-11-14

## 2021-11-08 RX ORDER — INSULIN LISPRO 100/ML
VIAL (ML) SUBCUTANEOUS
Refills: 0 | Status: ACTIVE | OUTPATIENT
Start: 2021-11-08 | End: 2022-10-07

## 2021-11-08 RX ORDER — ASPIRIN/CALCIUM CARB/MAGNESIUM 324 MG
0 TABLET ORAL
Qty: 0 | Refills: 0 | DISCHARGE

## 2021-11-08 RX ORDER — LISINOPRIL 2.5 MG/1
20 TABLET ORAL DAILY
Refills: 0 | Status: DISCONTINUED | OUTPATIENT
Start: 2021-11-08 | End: 2021-11-12

## 2021-11-08 RX ORDER — MORPHINE SULFATE 50 MG/1
4 CAPSULE, EXTENDED RELEASE ORAL EVERY 6 HOURS
Refills: 0 | Status: DISCONTINUED | OUTPATIENT
Start: 2021-11-08 | End: 2021-11-11

## 2021-11-08 RX ORDER — NICOTINE POLACRILEX 2 MG
1 GUM BUCCAL DAILY
Refills: 0 | Status: ACTIVE | OUTPATIENT
Start: 2021-11-08 | End: 2022-10-07

## 2021-11-08 RX ORDER — ONDANSETRON 8 MG/1
4 TABLET, FILM COATED ORAL EVERY 6 HOURS
Refills: 0 | Status: ACTIVE | OUTPATIENT
Start: 2021-11-08 | End: 2022-10-07

## 2021-11-08 RX ORDER — FUROSEMIDE 40 MG
1 TABLET ORAL
Qty: 0 | Refills: 0 | DISCHARGE

## 2021-11-08 RX ORDER — CARVEDILOL PHOSPHATE 80 MG/1
1 CAPSULE, EXTENDED RELEASE ORAL
Qty: 0 | Refills: 0 | DISCHARGE

## 2021-11-08 RX ADMIN — ENOXAPARIN SODIUM 40 MILLIGRAM(S): 100 INJECTION SUBCUTANEOUS at 23:02

## 2021-11-08 RX ADMIN — PANTOPRAZOLE SODIUM 40 MILLIGRAM(S): 20 TABLET, DELAYED RELEASE ORAL at 16:45

## 2021-11-08 RX ADMIN — INSULIN GLARGINE 30 UNIT(S): 100 INJECTION, SOLUTION SUBCUTANEOUS at 23:02

## 2021-11-08 RX ADMIN — Medication 81 MILLIGRAM(S): at 16:45

## 2021-11-08 RX ADMIN — SODIUM CHLORIDE 100 MILLILITER(S): 9 INJECTION, SOLUTION INTRAVENOUS at 18:15

## 2021-11-08 RX ADMIN — PIPERACILLIN AND TAZOBACTAM 200 GRAM(S): 4; .5 INJECTION, POWDER, LYOPHILIZED, FOR SOLUTION INTRAVENOUS at 13:15

## 2021-11-08 RX ADMIN — Medication 20 MILLIGRAM(S): at 16:45

## 2021-11-08 RX ADMIN — CARVEDILOL PHOSPHATE 12.5 MILLIGRAM(S): 80 CAPSULE, EXTENDED RELEASE ORAL at 16:45

## 2021-11-08 RX ADMIN — Medication 0.5 MILLIGRAM(S): at 16:45

## 2021-11-08 RX ADMIN — LISINOPRIL 20 MILLIGRAM(S): 2.5 TABLET ORAL at 13:40

## 2021-11-08 RX ADMIN — Medication 2: at 17:18

## 2021-11-08 RX ADMIN — Medication 50 MILLIGRAM(S): at 23:02

## 2021-11-08 RX ADMIN — TAMSULOSIN HYDROCHLORIDE 0.4 MILLIGRAM(S): 0.4 CAPSULE ORAL at 23:02

## 2021-11-08 RX ADMIN — Medication 6: at 13:40

## 2021-11-08 RX ADMIN — CARVEDILOL PHOSPHATE 12.5 MILLIGRAM(S): 80 CAPSULE, EXTENDED RELEASE ORAL at 23:02

## 2021-11-08 NOTE — H&P ADULT - NSHPPHYSICALEXAM_GEN_ALL_CORE
o/e:  AOx3, NAD    chest; non-labored breathing  heart; RRR s1s2  Abd: soft, obese, non-tender, left side colostomy in place with stool in bag  ext. b/l pitting edema, with redness in legs

## 2021-11-08 NOTE — H&P ADULT - NSICDXPASTMEDICALHX_GEN_ALL_CORE_FT
PAST MEDICAL HISTORY:  Diabetes     Diverticulitis     HTN (hypertension)     Obese     S/P colostomy left

## 2021-11-08 NOTE — CONSULT NOTE ADULT - SUBJECTIVE AND OBJECTIVE BOX
HPI:    55 yo M with PMH of DM, HTN, CHF, b/l LE swelling, Obesity(BMI 49), h/o diverticulitis/diverticular abscess s/p left colostomy 2021 at [facility] was seen in the ED for abdominal pain. Pt reports he is suffering from this colostomy bag and wants it reversed. Denies fever or chills, n/v/d/c. Pt reports the colostomy is functional.    PMH: as above  PSH: left colostomy 2021  Allergy: NKDA  SH: + smoking cig daily for last 20 yrs, denies etOH or illicit drug (2021 12:23)      PAST MEDICAL & SURGICAL HISTORY:  HTN (hypertension)    Diabetes    Diverticulitis    S/P colostomy  left    Obese    S/P colostomy  left 2021        Home Medications:  Admelog 100 units/mL injectable solution: Inject subcutaneously with meals per sliding scale as directed (2021 12:39)  ALPRAZolam 2 mg oral tablet: 1 tab(s) orally 2 times a day, As Needed (2021 12:39)  Aspirin Enteric Coated 81 mg oral delayed release tablet: 1 tab(s) orally once a day (2021 12:39)  atorvastatin 40 mg oral tablet: 1 tab(s) orally once a day (at bedtime) (2021 12:39)  carvedilol 12.5 mg oral tablet: 1 tab(s) orally 2 times a day (2021 12:39)  furosemide 40 mg oral tablet: 1 tab(s) orally once a day (2021 12:39)  lisinopril 20 mg oral tablet: 1 tab(s) orally once a day (2021 12:39)  metFORMIN 500 mg oral tablet: 1 tab(s) orally 2 times a day (2021 12:39)  Pfizer-BioNTech COVID-19 Vaccine PF 30 mcg/0.3 mL intramuscular suspension: 0.3 milliliter(s) intramuscular once  ***rec&#x27;d both doses*** (2021 12:39)  Semglee Prefilled Pen 100 units/mL subcutaneous solution: 50 unit(s) subcutaneously once a day (at bedtime) (2021 12:39)  tamsulosin 0.4 mg oral capsule: 1 cap(s) orally once a day (2021 12:39)      MEDICATIONS  (STANDING):  carvedilol 12.5 milliGRAM(s) Oral every 12 hours  dextrose 40% Gel 15 Gram(s) Oral once  dextrose 5%. 1000 milliLiter(s) (50 mL/Hr) IV Continuous <Continuous>  dextrose 5%. 1000 milliLiter(s) (100 mL/Hr) IV Continuous <Continuous>  dextrose 50% Injectable 25 Gram(s) IV Push once  dextrose 50% Injectable 12.5 Gram(s) IV Push once  dextrose 50% Injectable 25 Gram(s) IV Push once  enoxaparin Injectable 40 milliGRAM(s) SubCutaneous every 12 hours  furosemide    Tablet 20 milliGRAM(s) Oral daily  glucagon  Injectable 1 milliGRAM(s) IntraMuscular once  influenza   Vaccine 0.5 milliLiter(s) IntraMuscular once  insulin glargine Injectable (LANTUS) 30 Unit(s) SubCutaneous at bedtime  insulin lispro (ADMELOG) corrective regimen sliding scale   SubCutaneous three times a day before meals  lactated ringers. 1000 milliLiter(s) (100 mL/Hr) IV Continuous <Continuous>  lisinopril 20 milliGRAM(s) Oral daily  morphine  - Injectable 4 milliGRAM(s) IV Push once  pantoprazole  Injectable 40 milliGRAM(s) IV Push daily  tamsulosin 0.4 milliGRAM(s) Oral at bedtime    MEDICATIONS  (PRN):  acetaminophen     Tablet .. 650 milliGRAM(s) Oral every 6 hours PRN Mild Pain (1 - 3)  ketorolac   Injectable 15 milliGRAM(s) IV Push once PRN Moderate Pain (4 - 6)  morphine  - Injectable 4 milliGRAM(s) IV Push every 6 hours PRN Severe Pain (7 - 10)  ondansetron Injectable 4 milliGRAM(s) IV Push every 6 hours PRN Nausea and/or Vomiting      Allergies    No Known Allergies    Intolerances        SOCIAL HISTORY: Denies tobacco, etoh abuse or illicit drug use    FAMILY HISTORY:  FH: HTN (hypertension) (Father)        Vital Signs Last 24 Hrs  T(C): 36.8 (2021 15:32), Max: 37 (2021 10:34)  T(F): 98.2 (2021 15:32), Max: 98.6 (2021 10:34)  HR: 83 (2021 15:32) (81 - 88)  BP: 155/86 (2021 15:32) (155/86 - 179/104)  BP(mean): 120 (2021 10:34) (116 - 120)  RR: 18 (2021 15:32) (16 - 18)  SpO2: 94% (2021 15:32) (93% - 99%)        REVIEW OF SYSTEMS:    CONSTITUTIONAL:  As per HPI.  HEENT:  Eyes:  No diplopia or blurred vision. ENT:  No earache, sore throat or runny nose.  CARDIOVASCULAR:  No pressure, squeezing, tightness, heaviness or aching about the chest, neck, axilla or epigastrium.  RESPIRATORY:  No cough, shortness of breath, PND or orthopnea.  GASTROINTESTINAL:  No nausea, vomiting or diarrhea.  GENITOURINARY:  No dysuria, frequency or urgency.  MUSCULOSKELETAL:  As per HPI.  SKIN:  No change in skin, hair or nails.  NEUROLOGIC:  No paresthesias, fasciculations, seizures or weakness.  PSYCHIATRIC:  No disorder of thought or mood.  ENDOCRINE:  No heat or cold intolerance, polyuria or polydipsia.  HEMATOLOGICAL:  No easy bruising or bleedings:  .     PHYSICAL EXAMINATION:    GENERAL APPEARANCE:  Pt. is not currently dyspneic, in no distress. Pt. is alert, oriented, and pleasant.  HEENT:  Pupils are normal and react normally. No icterus. Mucous membranes well colored.  NECK:  Supple. No lymphadenopathy. Jugular venous pressure not elevated. Carotids equal.   HEART:   The cardiac impulse has a normal quality. Regular. Normal S1 and S2. There are no murmurs, rubs or gallops noted  CHEST:  Chest is clear to auscultation. Normal respiratory effort.  ABDOMEN:  Soft and nontender.   EXTREMITIES:  There is no cyanosis, clubbing or edema.   SKIN:  No rash or significant lesions are noted.    LABS:                        13.0   10.78 )-----------( 311      ( 2021 09:47 )             41.6         136  |  100  |  13  ----------------------------<  336<H>  4.4   |  31  |  1.10    Ca    8.7      2021 09:47    TPro  7.2  /  Alb  2.8<L>  /  TBili  0.4  /  DBili  x   /  AST  22  /  ALT  34  /  AlkPhos  126<H>  11-08    LIVER FUNCTIONS - ( 2021 09:47 )  Alb: 2.8 g/dL / Pro: 7.2 gm/dL / ALK PHOS: 126 U/L / ALT: 34 U/L / AST: 22 U/L / GGT: x                 Urinalysis Basic - ( 2021 09:47 )    Color: Yellow / Appearance: Clear / S.020 / pH: x  Gluc: x / Ketone: Trace  / Bili: Negative / Urobili: Negative mg/dL   Blood: x / Protein: 100 mg/dL / Nitrite: Negative   Leuk Esterase: Negative / RBC: 6-10 /HPF / WBC 0-2   Sq Epi: x / Non Sq Epi: Occasional / Bacteria: Occasional            RADIOLOGY & ADDITIONAL STUDIES:     CT Abdomen and Pelvis w/ Oral Cont and w/ IV Cont (21 @ 13:18) >  IMPRESSION: Diverticulitis of the sigmoid colon is again seen status post left lower quadrant colostomy. 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 Cannot rule out abscess formation in the anterior bladder wall. Findings are without significant change from the recent prior study    Xray Chest 2 Views PA/Lat (10.20.21 @ 13:29) >  IMPRESSION:  No acute radiographic findings         HPI:    57 yo M with PMH of DM, HTN, CHF, b/l LE swelling, Obesity(BMI 49), h/o diverticulitis/diverticular abscess s/p left colostomy 2021 at [facility] was seen in the ED for abdominal pain. Pt reports he is suffering from this colostomy bag and wants it reversed. Denies fever or chills, n/v/d/c. Pt reports the colostomy is functional. pat seen for pulmonary eval with h/o smoking, one pack last one week, does snore, lying comfortably smoke today one cigarette.    PMH: as above  PSH: left colostomy 2021  Allergy: NKDA  SH: + smoking cig daily for last 20 yrs, denies etOH or illicit drug (2021 12:23)      PAST MEDICAL & SURGICAL HISTORY:  HTN (hypertension)    Diabetes    Diverticulitis    S/P colostomy  left    Obese    S/P colostomy  left 2021        Home Medications:  Admelog 100 units/mL injectable solution: Inject subcutaneously with meals per sliding scale as directed (2021 12:39)  ALPRAZolam 2 mg oral tablet: 1 tab(s) orally 2 times a day, As Needed (2021 12:39)  Aspirin Enteric Coated 81 mg oral delayed release tablet: 1 tab(s) orally once a day (2021 12:39)  atorvastatin 40 mg oral tablet: 1 tab(s) orally once a day (at bedtime) (2021 12:39)  carvedilol 12.5 mg oral tablet: 1 tab(s) orally 2 times a day (2021 12:39)  furosemide 40 mg oral tablet: 1 tab(s) orally once a day (2021 12:39)  lisinopril 20 mg oral tablet: 1 tab(s) orally once a day (2021 12:39)  metFORMIN 500 mg oral tablet: 1 tab(s) orally 2 times a day (2021 12:39)  Pfizer-BioNTech COVID-19 Vaccine PF 30 mcg/0.3 mL intramuscular suspension: 0.3 milliliter(s) intramuscular once  ***rec&#x27;d both doses*** (2021 12:39)  Semglee Prefilled Pen 100 units/mL subcutaneous solution: 50 unit(s) subcutaneously once a day (at bedtime) (2021 12:39)  tamsulosin 0.4 mg oral capsule: 1 cap(s) orally once a day (2021 12:39)      MEDICATIONS  (STANDING):  carvedilol 12.5 milliGRAM(s) Oral every 12 hours  dextrose 40% Gel 15 Gram(s) Oral once  dextrose 5%. 1000 milliLiter(s) (50 mL/Hr) IV Continuous <Continuous>  dextrose 5%. 1000 milliLiter(s) (100 mL/Hr) IV Continuous <Continuous>  dextrose 50% Injectable 25 Gram(s) IV Push once  dextrose 50% Injectable 12.5 Gram(s) IV Push once  dextrose 50% Injectable 25 Gram(s) IV Push once  enoxaparin Injectable 40 milliGRAM(s) SubCutaneous every 12 hours  furosemide    Tablet 20 milliGRAM(s) Oral daily  glucagon  Injectable 1 milliGRAM(s) IntraMuscular once  influenza   Vaccine 0.5 milliLiter(s) IntraMuscular once  insulin glargine Injectable (LANTUS) 30 Unit(s) SubCutaneous at bedtime  insulin lispro (ADMELOG) corrective regimen sliding scale   SubCutaneous three times a day before meals  lactated ringers. 1000 milliLiter(s) (100 mL/Hr) IV Continuous <Continuous>  lisinopril 20 milliGRAM(s) Oral daily  morphine  - Injectable 4 milliGRAM(s) IV Push once  pantoprazole  Injectable 40 milliGRAM(s) IV Push daily  tamsulosin 0.4 milliGRAM(s) Oral at bedtime    MEDICATIONS  (PRN):  acetaminophen     Tablet .. 650 milliGRAM(s) Oral every 6 hours PRN Mild Pain (1 - 3)  ketorolac   Injectable 15 milliGRAM(s) IV Push once PRN Moderate Pain (4 - 6)  morphine  - Injectable 4 milliGRAM(s) IV Push every 6 hours PRN Severe Pain (7 - 10)  ondansetron Injectable 4 milliGRAM(s) IV Push every 6 hours PRN Nausea and/or Vomiting      Allergies    No Known Allergies    Intolerances        SOCIAL HISTORY: Denies tobacco, etoh abuse or illicit drug use    FAMILY HISTORY:  FH: HTN (hypertension) (Father)        Vital Signs Last 24 Hrs  T(C): 36.8 (2021 15:32), Max: 37 (2021 10:34)  T(F): 98.2 (2021 15:32), Max: 98.6 (2021 10:34)  HR: 83 (2021 15:32) (81 - 88)  BP: 155/86 (2021 15:32) (155/86 - 179/104)  BP(mean): 120 (2021 10:34) (116 - 120)  RR: 18 (2021 15:32) (16 - 18)  SpO2: 94% (2021 15:32) (93% - 99%)        REVIEW OF SYSTEMS:    CONSTITUTIONAL:  As per HPI.  HEENT:  Eyes:  No diplopia or blurred vision. ENT:  No earache, sore throat or runny nose.  CARDIOVASCULAR:  No pressure, squeezing, tightness, heaviness or aching about the chest, neck, axilla or epigastrium.  RESPIRATORY:  No cough, shortness of breath, PND or orthopnea.  GASTROINTESTINAL:  No nausea, vomiting or diarrhea.  GENITOURINARY:  No dysuria, frequency or urgency.  MUSCULOSKELETAL:  As per HPI.  SKIN:  No change in skin, hair or nails.  NEUROLOGIC:  No paresthesias, fasciculations, seizures or weakness.  PSYCHIATRIC:  No disorder of thought or mood.  ENDOCRINE:  No heat or cold intolerance, polyuria or polydipsia.  HEMATOLOGICAL:  No easy bruising or bleedings:  .     PHYSICAL EXAMINATION:    GENERAL APPEARANCE:  Pt. is not currently dyspneic, in no distress. Pt. is alert, oriented, and pleasant.  HEENT:  Pupils are normal and react normally. No icterus. Mucous membranes well colored.  NECK:  Supple. No lymphadenopathy. Jugular venous pressure not elevated. Carotids equal.   HEART:   The cardiac impulse has a normal quality. Regular. Normal S1 and S2. There are no murmurs, rubs or gallops noted  CHEST:  Chest is clear to auscultation. Normal respiratory effort.  ABDOMEN:  Soft and nontender.   EXTREMITIES:  There is no cyanosis, clubbing or edema.   SKIN:  No rash or significant lesions are noted.    LABS:                        13.0   10.78 )-----------( 311      ( 2021 09:47 )             41.6     11-08    136  |  100  |  13  ----------------------------<  336<H>  4.4   |  31  |  1.10    Ca    8.7      2021 09:47    TPro  7.2  /  Alb  2.8<L>  /  TBili  0.4  /  DBili  x   /  AST  22  /  ALT  34  /  AlkPhos  126<H>  11-08    LIVER FUNCTIONS - ( 2021 09:47 )  Alb: 2.8 g/dL / Pro: 7.2 gm/dL / ALK PHOS: 126 U/L / ALT: 34 U/L / AST: 22 U/L / GGT: x                 Urinalysis Basic - ( 2021 09:47 )    Color: Yellow / Appearance: Clear / S.020 / pH: x  Gluc: x / Ketone: Trace  / Bili: Negative / Urobili: Negative mg/dL   Blood: x / Protein: 100 mg/dL / Nitrite: Negative   Leuk Esterase: Negative / RBC: 6-10 /HPF / WBC 0-2   Sq Epi: x / Non Sq Epi: Occasional / Bacteria: Occasional            RADIOLOGY & ADDITIONAL STUDIES:     CT Abdomen and Pelvis w/ Oral Cont and w/ IV Cont (21 @ 13:18) >  IMPRESSION: Diverticulitis of the sigmoid colon is again seen status post left lower quadrant colostomy. 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 Cannot rule out abscess formation in the anterior bladder wall. Findings are without significant change from the recent prior study    Xray Chest 2 Views PA/Lat (10.20.21 @ 13:29) >  IMPRESSION:  No acute radiographic findings

## 2021-11-08 NOTE — ED ADULT NURSE NOTE - OBJECTIVE STATEMENT
pt. c/o lower central abd. pain while urinating. pt. states hx of colon resec. approx. 6 months ago, pt. had good output from colostomy bag. Pt. bladder scanned and found to have 3mls of urine s/p void. Pt. states he feels that he needs to "push" the urine out. pt. was told he had enlarged prostate.  Denies chills, fever, decreased output from colostomy bag, no blood in urine.

## 2021-11-08 NOTE — CONSULT NOTE ADULT - SUBJECTIVE AND OBJECTIVE BOX
HPI admission H&P:    55 yo M with PMH of DM, HTN, CHF, b/l LE swelling, Obesity(BMI 49), h/o diverticulitis/diverticular abscess   s/p left colostomy 07/2021 at [facility] was seen in the ED for abdominal pain.   Pt reports he is suffering from this colostomy bag and wants it reversed.   Denies fever or chills, n/v/d/c. Pt reports the colostomy is functional.    PMH: as above  PSH: left colostomy 7/2021  Allergy: NKDA  SH: + smoking cig daily for last 20 yrs, denies etOH or illicit drug (08 Nov 2021 12:23)    Admitted for reversal of L sided colostomy.  Pulmonary medicine also consulted for clearance GI consulted for colonoscopy  to be done prior to surgery planned for wed.  Pt denies any cardiac symptoms: chest pain, dyspnea, palpitaitons, syncope.  somewhat poor historian - reports h/o stress test in past year.  Reports an evaluation Dr. TATY Peace at [place] who ordered this.  Called his office & they state stress test Sep '20, echo & LM Mar '21   was completed, will fax me this.  Also reviewed allscripts & I see he was evaluated by Dr. Drummond  w/ arian & stress & echo were ordered but he cancelled these tests.  Dr. Drummond was not aware of tests I mentioned above.    PAST MEDICAL AND SURGICAL HISTORY:  HTN (hypertension)  Diabetes  Diverticulitis  S/P colostomy  left  Obese  S/P colostomy  left 7/2021    ALLERGIES:  Allergies  No Known Allergies  Intolerances    SOCIAL HISTORY:  neg x 3      FAMILY  HISTORY:  FH: HTN (hypertension) (Father)        MEDICATIONS:  OUTPATIENT:  Home Medications:  Admelog 100 units/mL injectable solution: Inject subcutaneously with meals per sliding scale as directed (08 Nov 2021 12:39)  ALPRAZolam 2 mg oral tablet: 1 tab(s) orally 2 times a day, As Needed (08 Nov 2021 12:39)  Aspirin Enteric Coated 81 mg oral delayed release tablet: 1 tab(s) orally once a day (08 Nov 2021 12:39)  atorvastatin 40 mg oral tablet: 1 tab(s) orally once a day (at bedtime) (08 Nov 2021 12:39)  carvedilol 12.5 mg oral tablet: 1 tab(s) orally 2 times a day (08 Nov 2021 12:39)  furosemide 40 mg oral tablet: 1 tab(s) orally once a day (08 Nov 2021 12:39)  lisinopril 20 mg oral tablet: 1 tab(s) orally once a day (08 Nov 2021 12:39)  metFORMIN 500 mg oral tablet: 1 tab(s) orally 2 times a day (08 Nov 2021 12:39)  Pfizer-BioNTech COVID-19 Vaccine PF 30 mcg/0.3 mL intramuscular suspension: 0.3 milliliter(s) intramuscular once  ***rec&#x27;d both doses*** (08 Nov 2021 12:39)  Semglee Prefilled Pen 100 units/mL subcutaneous solution: 50 unit(s) subcutaneously once a day (at bedtime) (08 Nov 2021 12:39)  tamsulosin 0.4 mg oral capsule: 1 cap(s) orally once a day (08 Nov 2021 12:39)        INPATIENT:  MEDICATIONS  (STANDING):  ALPRAZolam 0.5 milliGRAM(s) Oral once  aspirin  chewable 81 milliGRAM(s) Oral once  carvedilol 12.5 milliGRAM(s) Oral every 12 hours  carvedilol 12.5 milliGRAM(s) Oral once  dextrose 40% Gel 15 Gram(s) Oral once  dextrose 5%. 1000 milliLiter(s) (50 mL/Hr) IV Continuous <Continuous>  dextrose 5%. 1000 milliLiter(s) (100 mL/Hr) IV Continuous <Continuous>  dextrose 50% Injectable 25 Gram(s) IV Push once  dextrose 50% Injectable 12.5 Gram(s) IV Push once  dextrose 50% Injectable 25 Gram(s) IV Push once  enoxaparin Injectable 40 milliGRAM(s) SubCutaneous every 12 hours  furosemide    Tablet 20 milliGRAM(s) Oral daily  glucagon  Injectable 1 milliGRAM(s) IntraMuscular once  influenza   Vaccine 0.5 milliLiter(s) IntraMuscular once  insulin glargine Injectable (LANTUS) 30 Unit(s) SubCutaneous at bedtime  insulin lispro (ADMELOG) corrective regimen sliding scale   SubCutaneous three times a day before meals  lisinopril 20 milliGRAM(s) Oral daily  morphine  - Injectable 4 milliGRAM(s) IV Push once  pantoprazole  Injectable 40 milliGRAM(s) IV Push daily  tamsulosin 0.4 milliGRAM(s) Oral at bedtime    MEDICATIONS  (PRN):  acetaminophen     Tablet .. 650 milliGRAM(s) Oral every 6 hours PRN Mild Pain (1 - 3)  ketorolac   Injectable 15 milliGRAM(s) IV Push once PRN Moderate Pain (4 - 6)    MEDICATIONS  (PRN):  acetaminophen     Tablet .. 650 milliGRAM(s) Oral every 6 hours PRN Mild Pain (1 - 3)  ketorolac   Injectable 15 milliGRAM(s) IV Push once PRN Moderate Pain (4 - 6)    REVIEW OF SYSTEMS:    CONSTITUTIONAL: No weakness, fevers or chills  EYES/ENT: No visual changes;  No vertigo or throat pain   NECK: No pain or stiffness  RESPIRATORY: No cough, wheezing, hemoptysis; No shortness of breath  CARDIOVASCULAR: No chest pain or palpitations  GASTROINTESTINAL: No abdominal or epigastric pain. No nausea, vomiting, or hematemesis; No diarrhea or constipation. No melena or hematochezia.  GENITOURINARY: No dysuria, frequency or hematuria  NEUROLOGICAL: No numbness or weakness  SKIN: No itching, burning, rashes, or lesions   All other review of systems is negative unless indicated above    Vital Signs Last 24 Hrs  T(C): 36.8 (08 Nov 2021 15:32), Max: 37 (08 Nov 2021 10:34)  T(F): 98.2 (08 Nov 2021 15:32), Max: 98.6 (08 Nov 2021 10:34)  HR: 83 (08 Nov 2021 15:32) (81 - 88)  BP: 155/86 (08 Nov 2021 15:32) (155/86 - 179/104)  BP(mean): 120 (08 Nov 2021 10:34) (116 - 120)  RR: 18 (08 Nov 2021 15:32) (16 - 18)  SpO2: 94% (08 Nov 2021 15:32) (93% - 99%)      PHYSICAL EXAM:    Constitutional: NAD, awake and alert, obese  HEENT: PERR, EOMI,  No oral cyananosis.  Neck:  supple,  No JVD  Respiratory: Breath sounds are clear bilaterally, No wheezing, rales or rhonchi  Cardiovascular: S1 and S2, regular rate and rhythm, no Murmurs, gallops or rubs  Gastrointestinal: Bowel Sounds present, soft, nontender.   Extremities: No peripheral edema. No clubbing or cyanosis.  Vascular: 2+ peripheral pulses  Neurological: A/O x 3, no focal deficits      LABS: All Labs Reviewed:                        13.0   10.78 )-----------( 311      ( 08 Nov 2021 09:47 )             41.6     08 Nov 2021 09:47    136    |  100    |  13     ----------------------------<  336    4.4     |  31     |  1.10     Ca    8.7        08 Nov 2021 09:47    TPro  7.2    /  Alb  2.8    /  TBili  0.4    /  DBili  x      /  AST  22     /  ALT  34     /  AlkPhos  126    08 Nov 2021 09:47    ===============================  EKG:  NSR, RBBB, nonspecific T wave inversions in precordial leads.  ===============================    Artur Powers M.D.  Cardiology, [facility]  Cell: [phone]  Office:   [phone] ([facility] Office)  [phone] ([facility] Office)

## 2021-11-08 NOTE — ED ADULT NURSE REASSESSMENT NOTE - NS ED NURSE REASSESS COMMENT FT1
pt. given insulin prior to transport upstairs, pt. sleeping between care, pt. states he did not sleep last night due to pain, pt. did not take BP meds today and was given 20mg lisinopril prior to transport for HTN, floor RN price aware that coreg unavailable in ED and pt. needs dose upstairs.

## 2021-11-08 NOTE — ED PROVIDER NOTE - CARE PLAN
1 Principal Discharge DX:	Diverticulitis  Secondary Diagnosis:	Postprocedural intraabdominal abscess

## 2021-11-08 NOTE — H&P ADULT - HISTORY OF PRESENT ILLNESS
55 yo M with PMH of DM, HTN, CHF, b/l LE swelling, Obesity(BMI 49), h/o diverticulitis/diverticular abscess s/p left colostomy 07/2021 at [facility] was seen in the ED for abdominal pain. Pt reports he is suffering from this colostomy bag and wants it reversed. Denies fever or chills, n/v/d/c. Pt reports the colostomy is functional.    PMH: as above  PSH: left colostomy 7/2021  Allergy: NKDA  SH: + smoking cig daily for last 20 yrs, denies etOH or illicit drug

## 2021-11-08 NOTE — ED PROVIDER NOTE - PROGRESS NOTE DETAILS
French Carr for Dr. Hernandez:  Surgery consulted. As for CT: P.O. and IV contrast abdomen and pelvis. received call from radiology resident regarding pts ct frm outpatient 1 week ago with vidal , will give abx

## 2021-11-08 NOTE — ED PROVIDER NOTE - PHYSICAL EXAMINATION
Constitutional: NAD AAOx3  Eyes: PERRLA EOMI  Head: Normocephalic atraumatic  Mouth: MMM  Cardiac: regular rate   Resp: Lungs CTAB  GI: Abd s/nt/nd, no rebound or guarding.  Neuro: awake, alert, moving all extremities, cranial nerves 2-12 intact, sensation intact, no dysmetria.  Skin: No rashes Constitutional: NAD AAOx3  Eyes: PERRLA EOMI  Head: Normocephalic atraumatic  Mouth: MMM  Cardiac: regular rate   Resp: Lungs CTAB  GI: Abd s/nt/nd, no rebound or guarding. colostomy bag in llq. well healed incision lower abdomen  Neuro: awake, alert, moving all extremities, cranial nerves 2-12 intact, sensation intact, no dysmetria.  Skin: No rashes

## 2021-11-08 NOTE — ED PROVIDER NOTE - OBJECTIVE STATEMENT
55 y/o male w/ PMHx of Diabetes, Diverticulitis, HTN, Obese, s/p left colostomy presents to the ED c.o. sharp pain below abd. Pt. reports the pain as constant. CT scan was ordered by Dr. Laureano. 57 y/o male w/ PMHx of Diabetes, Diverticulitis, HTN, Obese, s/p left colostomy presents to the ED c.o. sharp pain in lower abdomen. Pt. reports the pain as constant. No precipitating, exacerbating or alleviating factors, had a recent outpatient ct ordered by dr murray who did his colotomy surgery.

## 2021-11-08 NOTE — ED PROVIDER NOTE - CLINICAL SUMMARY MEDICAL DECISION MAKING FREE TEXT BOX
57 yo m with colostomy, recently diverticulitis presents with abdominal pain. Patient sent in by dr shin for imaging, blood work and likely admission for further workup and treatment.

## 2021-11-08 NOTE — CONSULT NOTE ADULT - ATTENDING COMMENTS
56 year old man with diverting loop colostomy for diverticulitis, here with pain. Consulted by colorectal for pre-operative endoscopic eval.     Plan to prep today for colonoscopy tomorrow.

## 2021-11-08 NOTE — ED PROVIDER NOTE - NS ED ROS FT
Constitutional: No fever or chills  Eyes: No visual changes  HEENT: No throat pain  CV: No chest pain  Resp: No SOB no cough  GI: (+) Diffuse abd. pain, (-) n,v,d  : No dysuria  MSK: No musculoskeletal pain  Skin: No rash  Neuro: No headache

## 2021-11-08 NOTE — CONSULT NOTE ADULT - ASSESSMENT
A/P:  57 yo M with multiple comorbidities with left side colostomy  pending reversal    Plan:   c/w diet  GI consult for colonoscopy  Cardiology, Pulmonary, Medicine consult for clearances  pain control  start home meds  GI/DVT prop A/P:  57 yo M with multiple comorbidities h/o of DM, HTN, CHF, b/l LE swelling, Obesity(BMI 49), h/o smoking, possible RAFFI & h/o diverticulitis/diverticular abscess s/p left colostomy 07/2021 with left side colostomy waiting reversal    Plan:     pulmonary optimizized, there is no absolute contra-indication for the plan procedure watch for any apneic episode postop  incentive scotty  aerosols  nicotine patch  pain control  GI/DVT prop

## 2021-11-08 NOTE — CONSULT NOTE ADULT - ASSESSMENT
55 y/o male with hx of significant diverticulitis s/p recent diverting loop colostomy. Presenting today in ED for reversal, as he continues to have complications/ abdominal discomfort.    PLAN  Obtain cardiac medical clearance prior to Colonoscopy  Colonoscopy scheduled for Wednesday for pre op prior to revision.  EMMY Bennett discussed case with Dr. aLureano    Discussed with Dr. Brown

## 2021-11-08 NOTE — CONSULT NOTE ADULT - SUBJECTIVE AND OBJECTIVE BOX
HPI:  55 y/o male with multiple medical comorbidities, s/p left colostomy 07/2021 at [facility] due to diverticular abscess presenting c/o diffuse abdominal pain. Reports he is having progressively worsening abdominal pain from his colostomy bag.  Reports at the time of surgery they were unable to resect a portion of the colon as it was "stuck to his prostate". He reports no changes to his ostomy output. At this time denies fever, chills, n/v, diarrhea, constipation.    PAST MEDICAL & SURGICAL HISTORY:  HTN (hypertension)  Diabetes  Diverticulitis  S/P colostomy left  Obese  S/P colostomy left 7/2021    MEDICATIONS  (STANDING):  ALPRAZolam 0.5 milliGRAM(s) Oral once  aspirin  chewable 81 milliGRAM(s) Oral once  carvedilol 12.5 milliGRAM(s) Oral every 12 hours  carvedilol 12.5 milliGRAM(s) Oral once  dextrose 40% Gel 15 Gram(s) Oral once  dextrose 5%. 1000 milliLiter(s) (50 mL/Hr) IV Continuous <Continuous>  dextrose 5%. 1000 milliLiter(s) (100 mL/Hr) IV Continuous <Continuous>  dextrose 50% Injectable 25 Gram(s) IV Push once  dextrose 50% Injectable 12.5 Gram(s) IV Push once  dextrose 50% Injectable 25 Gram(s) IV Push once  enoxaparin Injectable 40 milliGRAM(s) SubCutaneous every 12 hours  furosemide    Tablet 20 milliGRAM(s) Oral daily  glucagon  Injectable 1 milliGRAM(s) IntraMuscular once  insulin glargine Injectable (LANTUS) 30 Unit(s) SubCutaneous at bedtime  insulin lispro (ADMELOG) corrective regimen sliding scale   SubCutaneous three times a day before meals  lisinopril 20 milliGRAM(s) Oral daily  lisinopril 20 milliGRAM(s) Oral once  morphine  - Injectable 4 milliGRAM(s) IV Push once  pantoprazole  Injectable 40 milliGRAM(s) IV Push daily  piperacillin/tazobactam IVPB. 3.375 Gram(s) IV Intermittent Once  tamsulosin 0.4 milliGRAM(s) Oral at bedtime    MEDICATIONS  (PRN):  acetaminophen     Tablet .. 650 milliGRAM(s) Oral every 6 hours PRN Mild Pain (1 - 3)  ketorolac   Injectable 15 milliGRAM(s) IV Push once PRN Moderate Pain (4 - 6)    Allergies  No Known Allergies    SOCIAL HISTORY:  FAMILY HISTORY:  FH: HTN (hypertension) (Father)    REVIEW OF SYSTEMS:  CONSTITUTIONAL: No weakness, fevers or chills  EYES/ENT: No visual changes;  No vertigo or throat pain   NECK: No pain or stiffness  RESPIRATORY: No cough, wheezing, hemoptysis; No shortness of breath  CARDIOVASCULAR: No chest pain or palpitations  GASTROINTESTINAL: + abdominal pain. No nausea, vomiting, or hematemesis; No diarrhea or constipation. No melena or hematochezia.  GENITOURINARY: No dysuria, frequency or hematuria  NEUROLOGICAL: No numbness or weakness  SKIN: No itching, burning, rashes, or lesions   PSYCH: Normal mood and affect  All other review of systems is negative unless indicated above.  Vital Signs Last 24 Hrs  T(C): 37 (08 Nov 2021 10:34), Max: 37 (08 Nov 2021 10:34)  T(F): 98.6 (08 Nov 2021 10:34), Max: 98.6 (08 Nov 2021 10:34)  HR: 87 (08 Nov 2021 10:34) (87 - 88)  BP: 164/99 (08 Nov 2021 10:34) (164/99 - 177/96)  BP(mean): 120 (08 Nov 2021 10:34) (116 - 120)  RR: 18 (08 Nov 2021 10:34) (18 - 18)  SpO2: 94% (08 Nov 2021 10:34) (94% - 94%)    PHYSICAL EXAM:  Constitutional: NAD, well-developed  HEENT: MMM  Neck: No LAD  Respiratory: CTAB  Cardiovascular: S1 and S2, RRR, no M/G/R  Gastrointestinal: BS+, soft,  obese, mildly tender in all 4 quadrants, no rigidity  Extremities: No peripheral edema  Vascular: 2+ peripheral pulses  Neurological: A/O x 3, no focal deficits  Psychiatric: Normal mood, normal affect  Skin: No rashes    LABS:                        13.0   10.78 )-----------( 311      ( 08 Nov 2021 09:47 )             41.6     11-08    136  |  100  |  13  ----------------------------<  336<H>  4.4   |  31  |  1.10    Ca    8.7      08 Nov 2021 09:47    TPro  7.2  /  Alb  2.8<L>  /  TBili  0.4  /  DBili  x   /  AST  22  /  ALT  34  /  AlkPhos  126<H>  11-08      LIVER FUNCTIONS - ( 08 Nov 2021 09:47 )  Alb: 2.8 g/dL / Pro: 7.2 gm/dL / ALK PHOS: 126 U/L / ALT: 34 U/L / AST: 22 U/L / GGT: x        HPI:  55 y/o male with multiple medical comorbidities, s/p left colostomy 07/2021 at [facility] due to diverticular abscess presenting c/o diffuse abdominal pain.     Reports he is having progressively worsening abdominal pain from his colostomy bag and diffusely from his abdomen since his surgery. At first intermittent, then more severe and constant. Now is 8/10. Pain is diffuse, crampy. No alleviating or exacerbating factors. No overt signs of bleeding from the ostomy like hematochezia, melena. No change in output.  Reports at the time of surgery they were unable to resect a portion of the colon as it was "stuck to his prostate". He reports no changes to his ostomy output. At this time denies fever, chills, n/v, diarrhea, constipation. No sick contacts or travel history.     PAST MEDICAL & SURGICAL HISTORY:  HTN (hypertension)  Diabetes  Diverticulitis  S/P colostomy left  Obese  S/P colostomy left 7/2021    MEDICATIONS  (STANDING):  ALPRAZolam 0.5 milliGRAM(s) Oral once  aspirin  chewable 81 milliGRAM(s) Oral once  carvedilol 12.5 milliGRAM(s) Oral every 12 hours  carvedilol 12.5 milliGRAM(s) Oral once  dextrose 40% Gel 15 Gram(s) Oral once  dextrose 5%. 1000 milliLiter(s) (50 mL/Hr) IV Continuous <Continuous>  dextrose 5%. 1000 milliLiter(s) (100 mL/Hr) IV Continuous <Continuous>  dextrose 50% Injectable 25 Gram(s) IV Push once  dextrose 50% Injectable 12.5 Gram(s) IV Push once  dextrose 50% Injectable 25 Gram(s) IV Push once  enoxaparin Injectable 40 milliGRAM(s) SubCutaneous every 12 hours  furosemide    Tablet 20 milliGRAM(s) Oral daily  glucagon  Injectable 1 milliGRAM(s) IntraMuscular once  insulin glargine Injectable (LANTUS) 30 Unit(s) SubCutaneous at bedtime  insulin lispro (ADMELOG) corrective regimen sliding scale   SubCutaneous three times a day before meals  lisinopril 20 milliGRAM(s) Oral daily  lisinopril 20 milliGRAM(s) Oral once  morphine  - Injectable 4 milliGRAM(s) IV Push once  pantoprazole  Injectable 40 milliGRAM(s) IV Push daily  piperacillin/tazobactam IVPB. 3.375 Gram(s) IV Intermittent Once  tamsulosin 0.4 milliGRAM(s) Oral at bedtime    MEDICATIONS  (PRN):  acetaminophen     Tablet .. 650 milliGRAM(s) Oral every 6 hours PRN Mild Pain (1 - 3)  ketorolac   Injectable 15 milliGRAM(s) IV Push once PRN Moderate Pain (4 - 6)    Allergies  No Known Allergies    SOCIAL HISTORY: no smoking, drinking or drugs  FAMILY HISTORY: no colon or stomach cancer  FH: HTN (hypertension) (Father)    REVIEW OF SYSTEMS:  CONSTITUTIONAL: No weakness, fevers or chills  EYES/ENT: No visual changes;  No vertigo or throat pain   NECK: No pain or stiffness  RESPIRATORY: No cough, wheezing, hemoptysis; No shortness of breath  CARDIOVASCULAR: No chest pain or palpitations  GASTROINTESTINAL: + abdominal pain. No nausea, vomiting, or hematemesis; No diarrhea or constipation. No melena or hematochezia.  GENITOURINARY: No dysuria, frequency or hematuria  NEUROLOGICAL: No numbness or weakness  SKIN: No itching, burning, rashes, or lesions   PSYCH: Normal mood and affect  All other review of systems is negative unless indicated above.  Vital Signs Last 24 Hrs  T(C): 37 (08 Nov 2021 10:34), Max: 37 (08 Nov 2021 10:34)  T(F): 98.6 (08 Nov 2021 10:34), Max: 98.6 (08 Nov 2021 10:34)  HR: 87 (08 Nov 2021 10:34) (87 - 88)  BP: 164/99 (08 Nov 2021 10:34) (164/99 - 177/96)  BP(mean): 120 (08 Nov 2021 10:34) (116 - 120)  RR: 18 (08 Nov 2021 10:34) (18 - 18)  SpO2: 94% (08 Nov 2021 10:34) (94% - 94%)    PHYSICAL EXAM:  Constitutional: NAD, well-developed  HEENT: MMM  Neck: No LAD  Respiratory: CTAB  Cardiovascular: S1 and S2, RRR, no M/G/R  Gastrointestinal: BS+, soft,  obese, mildly tender in all 4 quadrants, no rigidity  Extremities: No peripheral edema  Vascular: 2+ peripheral pulses  Neurological: A/O x 3, no focal deficits  Psychiatric: Normal mood, normal affect  Skin: No rashes    LABS:                        13.0   10.78 )-----------( 311      ( 08 Nov 2021 09:47 )             41.6     11-08    136  |  100  |  13  ----------------------------<  336<H>  4.4   |  31  |  1.10    Ca    8.7      08 Nov 2021 09:47    TPro  7.2  /  Alb  2.8<L>  /  TBili  0.4  /  DBili  x   /  AST  22  /  ALT  34  /  AlkPhos  126<H>  11-08      LIVER FUNCTIONS - ( 08 Nov 2021 09:47 )  Alb: 2.8 g/dL / Pro: 7.2 gm/dL / ALK PHOS: 126 U/L / ALT: 34 U/L / AST: 22 U/L / GGT: x

## 2021-11-08 NOTE — CONSULT NOTE ADULT - PROBLEM SELECTOR RECOMMENDATION 9
No coronary history of symptoms but multiple cardiac risk factors.    Somewhat poor historian w/ regard to cardiology followup.    He reports a stress test '20 - records requested & are pending.  Nonspecific ST-T changes across precordial leads, unclear if these are new  since stress in '20.  Plan for lexiscan stress for surgical clearance tommorrow, echo also ordered.

## 2021-11-08 NOTE — H&P ADULT - ASSESSMENT
A/P:  55 yo M with multiple comorbidities with left side colostomy  pending reversal    Plan:   c/w diet  GI consult for colonoscopy  Cardiology, Pulmonary, Medicine consult for clearances  pain control  start home meds  GI/DVT prop    plan d/w Dr. Lagos

## 2021-11-08 NOTE — ED ADULT TRIAGE NOTE - CHIEF COMPLAINT QUOTE
pt c/o decreased urine output; decreasing over the past week with periumbilical pain. +discharge, + dysuria, +chills. hx- recent colostomy d/t diverticulitis.

## 2021-11-09 ENCOUNTER — APPOINTMENT (OUTPATIENT)
Dept: CARDIOLOGY | Facility: CLINIC | Age: 56
End: 2021-11-09

## 2021-11-09 LAB
ALBUMIN SERPL ELPH-MCNC: 2.7 G/DL — LOW (ref 3.3–5)
ALP SERPL-CCNC: 111 U/L — SIGNIFICANT CHANGE UP (ref 40–120)
ALT FLD-CCNC: 33 U/L — SIGNIFICANT CHANGE UP (ref 12–78)
ANION GAP SERPL CALC-SCNC: 3 MMOL/L — LOW (ref 5–17)
APTT BLD: 29.9 SEC — SIGNIFICANT CHANGE UP (ref 27.5–35.5)
AST SERPL-CCNC: 26 U/L — SIGNIFICANT CHANGE UP (ref 15–37)
BASOPHILS # BLD AUTO: 0.04 K/UL — SIGNIFICANT CHANGE UP (ref 0–0.2)
BASOPHILS NFR BLD AUTO: 0.4 % — SIGNIFICANT CHANGE UP (ref 0–2)
BILIRUB SERPL-MCNC: 0.4 MG/DL — SIGNIFICANT CHANGE UP (ref 0.2–1.2)
BUN SERPL-MCNC: 10 MG/DL — SIGNIFICANT CHANGE UP (ref 7–23)
CALCIUM SERPL-MCNC: 8.9 MG/DL — SIGNIFICANT CHANGE UP (ref 8.5–10.1)
CHLORIDE SERPL-SCNC: 103 MMOL/L — SIGNIFICANT CHANGE UP (ref 96–108)
CO2 SERPL-SCNC: 30 MMOL/L — SIGNIFICANT CHANGE UP (ref 22–31)
COVID-19 NUCLEOCAPSID GAM AB INTERP: NEGATIVE — SIGNIFICANT CHANGE UP
COVID-19 NUCLEOCAPSID TOTAL GAM ANTIBODY RESULT: 0.09 INDEX — SIGNIFICANT CHANGE UP
COVID-19 SPIKE DOMAIN AB INTERP: POSITIVE
COVID-19 SPIKE DOMAIN ANTIBODY RESULT: 197 U/ML — HIGH
CREAT SERPL-MCNC: 0.88 MG/DL — SIGNIFICANT CHANGE UP (ref 0.5–1.3)
CULTURE RESULTS: SIGNIFICANT CHANGE UP
EOSINOPHIL # BLD AUTO: 0.18 K/UL — SIGNIFICANT CHANGE UP (ref 0–0.5)
EOSINOPHIL NFR BLD AUTO: 1.7 % — SIGNIFICANT CHANGE UP (ref 0–6)
GLUCOSE SERPL-MCNC: 233 MG/DL — HIGH (ref 70–99)
HCT VFR BLD CALC: 42.3 % — SIGNIFICANT CHANGE UP (ref 39–50)
HGB BLD-MCNC: 13.5 G/DL — SIGNIFICANT CHANGE UP (ref 13–17)
IMM GRANULOCYTES NFR BLD AUTO: 0.6 % — SIGNIFICANT CHANGE UP (ref 0–1.5)
INR BLD: 1.12 RATIO — SIGNIFICANT CHANGE UP (ref 0.88–1.16)
LYMPHOCYTES # BLD AUTO: 0.85 K/UL — LOW (ref 1–3.3)
LYMPHOCYTES # BLD AUTO: 7.9 % — LOW (ref 13–44)
MAGNESIUM SERPL-MCNC: 2.3 MG/DL — SIGNIFICANT CHANGE UP (ref 1.6–2.6)
MCHC RBC-ENTMCNC: 27.4 PG — SIGNIFICANT CHANGE UP (ref 27–34)
MCHC RBC-ENTMCNC: 31.9 GM/DL — LOW (ref 32–36)
MCV RBC AUTO: 85.8 FL — SIGNIFICANT CHANGE UP (ref 80–100)
MONOCYTES # BLD AUTO: 0.61 K/UL — SIGNIFICANT CHANGE UP (ref 0–0.9)
MONOCYTES NFR BLD AUTO: 5.7 % — SIGNIFICANT CHANGE UP (ref 2–14)
NEUTROPHILS # BLD AUTO: 9.03 K/UL — HIGH (ref 1.8–7.4)
NEUTROPHILS NFR BLD AUTO: 83.7 % — HIGH (ref 43–77)
PHOSPHATE SERPL-MCNC: 3 MG/DL — SIGNIFICANT CHANGE UP (ref 2.5–4.5)
PLATELET # BLD AUTO: 337 K/UL — SIGNIFICANT CHANGE UP (ref 150–400)
POTASSIUM SERPL-MCNC: 4.3 MMOL/L — SIGNIFICANT CHANGE UP (ref 3.5–5.3)
POTASSIUM SERPL-SCNC: 4.3 MMOL/L — SIGNIFICANT CHANGE UP (ref 3.5–5.3)
PROT SERPL-MCNC: 6.9 GM/DL — SIGNIFICANT CHANGE UP (ref 6–8.3)
PROTHROM AB SERPL-ACNC: 12.9 SEC — SIGNIFICANT CHANGE UP (ref 10.6–13.6)
RBC # BLD: 4.93 M/UL — SIGNIFICANT CHANGE UP (ref 4.2–5.8)
RBC # FLD: 14.4 % — SIGNIFICANT CHANGE UP (ref 10.3–14.5)
SARS-COV-2 IGG+IGM SERPL QL IA: 0.09 INDEX — SIGNIFICANT CHANGE UP
SARS-COV-2 IGG+IGM SERPL QL IA: 197 U/ML — HIGH
SARS-COV-2 IGG+IGM SERPL QL IA: NEGATIVE — SIGNIFICANT CHANGE UP
SARS-COV-2 IGG+IGM SERPL QL IA: POSITIVE
SODIUM SERPL-SCNC: 136 MMOL/L — SIGNIFICANT CHANGE UP (ref 135–145)
SPECIMEN SOURCE: SIGNIFICANT CHANGE UP
WBC # BLD: 10.77 K/UL — HIGH (ref 3.8–10.5)
WBC # FLD AUTO: 10.77 K/UL — HIGH (ref 3.8–10.5)

## 2021-11-09 PROCEDURE — 99232 SBSQ HOSP IP/OBS MODERATE 35: CPT

## 2021-11-09 PROCEDURE — 99233 SBSQ HOSP IP/OBS HIGH 50: CPT

## 2021-11-09 PROCEDURE — 93018 CV STRESS TEST I&R ONLY: CPT

## 2021-11-09 PROCEDURE — 93016 CV STRESS TEST SUPVJ ONLY: CPT

## 2021-11-09 PROCEDURE — 93306 TTE W/DOPPLER COMPLETE: CPT | Mod: 26

## 2021-11-09 RX ORDER — PIPERACILLIN AND TAZOBACTAM 4; .5 G/20ML; G/20ML
3.38 INJECTION, POWDER, LYOPHILIZED, FOR SOLUTION INTRAVENOUS EVERY 8 HOURS
Refills: 0 | Status: DISCONTINUED | OUTPATIENT
Start: 2021-11-09 | End: 2021-11-12

## 2021-11-09 RX ORDER — REGADENOSON 0.08 MG/ML
0.4 INJECTION, SOLUTION INTRAVENOUS ONCE
Refills: 0 | Status: COMPLETED | OUTPATIENT
Start: 2021-11-09 | End: 2021-11-09

## 2021-11-09 RX ORDER — INSULIN GLARGINE 100 [IU]/ML
35 INJECTION, SOLUTION SUBCUTANEOUS AT BEDTIME
Refills: 0 | Status: ACTIVE | OUTPATIENT
Start: 2021-11-09 | End: 2022-10-08

## 2021-11-09 RX ORDER — PIPERACILLIN AND TAZOBACTAM 4; .5 G/20ML; G/20ML
3.38 INJECTION, POWDER, LYOPHILIZED, FOR SOLUTION INTRAVENOUS ONCE
Refills: 0 | Status: COMPLETED | OUTPATIENT
Start: 2021-11-09 | End: 2021-11-09

## 2021-11-09 RX ORDER — SOD SULF/SODIUM/NAHCO3/KCL/PEG
4000 SOLUTION, RECONSTITUTED, ORAL ORAL ONCE
Refills: 0 | Status: COMPLETED | OUTPATIENT
Start: 2021-11-09 | End: 2021-11-09

## 2021-11-09 RX ADMIN — PIPERACILLIN AND TAZOBACTAM 25 GRAM(S): 4; .5 INJECTION, POWDER, LYOPHILIZED, FOR SOLUTION INTRAVENOUS at 22:40

## 2021-11-09 RX ADMIN — ENOXAPARIN SODIUM 40 MILLIGRAM(S): 100 INJECTION SUBCUTANEOUS at 21:24

## 2021-11-09 RX ADMIN — Medication 4000 MILLILITER(S): at 16:48

## 2021-11-09 RX ADMIN — PIPERACILLIN AND TAZOBACTAM 200 GRAM(S): 4; .5 INJECTION, POWDER, LYOPHILIZED, FOR SOLUTION INTRAVENOUS at 16:46

## 2021-11-09 RX ADMIN — Medication 20 MILLIGRAM(S): at 12:30

## 2021-11-09 RX ADMIN — Medication 4: at 12:30

## 2021-11-09 RX ADMIN — INSULIN GLARGINE 35 UNIT(S): 100 INJECTION, SOLUTION SUBCUTANEOUS at 21:24

## 2021-11-09 RX ADMIN — Medication 6: at 05:44

## 2021-11-09 RX ADMIN — TAMSULOSIN HYDROCHLORIDE 0.4 MILLIGRAM(S): 0.4 CAPSULE ORAL at 21:25

## 2021-11-09 RX ADMIN — LISINOPRIL 20 MILLIGRAM(S): 2.5 TABLET ORAL at 12:30

## 2021-11-09 RX ADMIN — CARVEDILOL PHOSPHATE 12.5 MILLIGRAM(S): 80 CAPSULE, EXTENDED RELEASE ORAL at 12:30

## 2021-11-09 RX ADMIN — CARVEDILOL PHOSPHATE 12.5 MILLIGRAM(S): 80 CAPSULE, EXTENDED RELEASE ORAL at 21:25

## 2021-11-09 RX ADMIN — Medication 50 MILLIGRAM(S): at 21:26

## 2021-11-09 RX ADMIN — Medication 2: at 16:47

## 2021-11-09 RX ADMIN — ENOXAPARIN SODIUM 40 MILLIGRAM(S): 100 INJECTION SUBCUTANEOUS at 12:31

## 2021-11-09 RX ADMIN — REGADENOSON 0.4 MILLIGRAM(S): 0.08 INJECTION, SOLUTION INTRAVENOUS at 10:30

## 2021-11-09 RX ADMIN — PANTOPRAZOLE SODIUM 40 MILLIGRAM(S): 20 TABLET, DELAYED RELEASE ORAL at 12:30

## 2021-11-09 RX ADMIN — SODIUM CHLORIDE 100 MILLILITER(S): 9 INJECTION, SOLUTION INTRAVENOUS at 05:42

## 2021-11-09 NOTE — DIETITIAN INITIAL EVALUATION ADULT. - PERTINENT LABORATORY DATA
11-09    136  |  103  |  10  ----------------------------<  233<H>  4.3   |  30  |  0.88    Ca    8.9      09 Nov 2021 08:10  Phos  3.0     11-09  Mg     2.3     11-09    TPro  6.9  /  Alb  2.7<L>  /  TBili  0.4  /  DBili  x   /  AST  26  /  ALT  33  /  AlkPhos  111  11-09 11-10    139  |  102  |  8   ----------------------------<  154<H>  3.9   |  33<H>  |  1.09    Ca    9.1      10 Nov 2021 08:24  Phos  3.7     11-10  Mg     2.2     11-10    TPro  7.5  /  Alb  2.9<L>  /  TBili  0.5  /  DBili  x   /  AST  26  /  ALT  34  /  AlkPhos  116  11-10  BMI: BMI (kg/m2): 48.7 (11-08-21 @ 08:36)  HbA1c: A1C with Estimated Average Glucose Result: 11.4 % (10-20-21 @ 14:48)    Glucose: POCT Blood Glucose.: 132 mg/dL (11-10-21 @ 11:29)

## 2021-11-09 NOTE — DIETITIAN INITIAL EVALUATION ADULT. - OTHER INFO
57 yo M with PMH of DM, HTN, CHF, b/l LE swelling, Obesity(BMI 49), h/o diverticulitis/diverticular abscess s/p left colostomy 07/2021 at [facility] was seen in the ED for abdominal pain. Pt reports he is suffering from this colostomy bag and wants it reversed. Denies fever or chills, n/v/d/c. Pt reports the colostomy is functional.    PMH: as above  PSH: left colostomy 7/2021  Allergy: NKDA  SH: + smoking cig daily for last 20 yrs, denies etOH or illicit drug (08 Nov 2021 12:23)    11/9: sitting up in bed, pain and discomfort improving. no fever or diarrhea today. plan for colonoscopy in AM      57 y/o male w/ pmhx of DM, HTN, CHF, b/l LE swelling, Obesity(BMI 49), h/o diverticulitis/diverticular abscess s/p left colostomy 07/2021 presenting for re-evaluation of colostomy/requesting reversal.     # abd pain due to colostomy and hx of diverticulitis   - plan for colostomy reversal with Dr. Jensen  - plan for colonoscopy in AM w/ GI  - s/p nuclear stress test , results pending ECHO pending   - cardio clearance poor    Home Medications:  Admelog 100 units/mL injectable solution: Inject subcutaneously with meals per sliding scale as directed (08 Nov 2021 12:39)  metFORMIN 500 mg oral tablet: 1 tab(s) orally 2 times a day (08 Nov 2021 12:39) 57yo male with PMH significant for DM, HTN, CHF, b/l LE swelling, obesity, h/o diverticulitis/diverticular abscess s/p Lt colostomy; pt admitted for colostomy reversal. s/p stress test on 11/9.  pending surgery on 11/11 for possible resection and ileostomy.  A1C 11.4, pt will need detailed diet education prior to d/c.

## 2021-11-09 NOTE — PROGRESS NOTE ADULT - ASSESSMENT
57 y/o male w/ pmhx of DM, HTN, CHF, b/l LE swelling, Obesity(BMI 49), h/o diverticulitis/diverticular abscess s/p left colostomy 07/2021 presenting for re-evaluation of colostomy/requesting reversal.     # abd pain due to colostomy and hx of diverticulitis   - plan for colostomy reversal with Dr. Jensen  - plan for colonoscopy in AM w/ GI  - s/p nuclear stress test , results pending ECHO pending   - cardio clearance poor   57 y/o male w/ pmhx of DM, HTN, CHF, b/l LE swelling, Obesity (BMI 49), h/o diverticulitis/diverticular abscess s/p left colostomy 07/2021 presenting for re-evaluation of colostomy/requesting reversal.     # abd pain due to colostomy and hx of diverticulitis   - plan for colostomy reversal with Dr. Jensen  - plan for colonoscopy in AM w/ GI  - s/p nuclear stress test , results pending ECHO pending   - cardio clearance   - pulm - optimized no contraindications for procedure     # hx of DM 2   - cont w/ lantus 30 and ISS   - RD consulted     # chronic diastolic heart failure   - echo and stress test pending   - cardio Dr. Powers

## 2021-11-09 NOTE — DIETITIAN INITIAL EVALUATION ADULT. - ADD RECOMMEND
1) s/p surgery, advance diet as tolerated to low fiber, consistent carb 2) if pt is unable to tolerate diet advancement within 5 days, consider PN and re-consult 3) daily wt checks to track/trend changes 4) consider checking vitamin D level and supplement prn 5) monitor NPO length, advancement/tolerance nutr source

## 2021-11-09 NOTE — DIETITIAN INITIAL EVALUATION ADULT. - ORAL INTAKE PTA/DIET HISTORY
pt has fluctuating PO intake; on good days, consumes 3 good sized meals.  on bad days, consumes nothing.  When having pain in abd, eats nothing and blood glucose levels are very high, eats nothing and they remain high.  HAs been unable to walk or do any exercising since pt has fluctuating PO intake; on good days, consumes 3 good sized meals.  on bad days, consumes nothing.  When having pain in abd, eats nothing and blood glucose levels are very high, eats nothing and they remain high.  HAs been unable to walk or do any exercising since colostomy surgery, very weak.

## 2021-11-09 NOTE — PROGRESS NOTE ADULT - SUBJECTIVE AND OBJECTIVE BOX
55 yo M with PMH of DM, HTN, CHF, b/l LE swelling, Obesity(BMI 49), h/o diverticulitis/diverticular abscess s/p left colostomy 07/2021 at [facility] was seen in the ED for abdominal pain. Pt reports he is suffering from this colostomy bag and wants it reversed. Denies fever or chills, n/v/d/c. Pt reports the colostomy is functional.    PMH: as above  PSH: left colostomy 7/2021  Allergy: NKDA  SH: + smoking cig daily for last 20 yrs, denies etOH or illicit drug (08 Nov 2021 12:23)    11/9: sitting up in bed, pain and discomfort improving. no fever or diarrhea today. plan for colonoscopy in AM     REVIEW OF SYSTEMS:    CONSTITUTIONAL: No weakness, fevers or chills  EYES/ENT: No visual changes;  No vertigo or throat pain   NECK: No pain or stiffness  RESPIRATORY: No cough, wheezing, hemoptysis; No shortness of breath  CARDIOVASCULAR: No chest pain or palpitations  GASTROINTESTINAL: No abdominal or epigastric pain. No nausea, vomiting, or hematemesis; No diarrhea or constipation. No melena or hematochezia.  GENITOURINARY: No dysuria, frequency or hematuria  NEUROLOGICAL: No numbness or weakness  SKIN: No itching, burning, rashes, or lesions   All other review of systems is negative unless indicated above      Vital Signs Last 24 Hrs  T(C): 36.9 (09 Nov 2021 08:32), Max: 37.1 (08 Nov 2021 22:48)  T(F): 98.4 (09 Nov 2021 08:32), Max: 98.8 (08 Nov 2021 22:48)  HR: 71 (09 Nov 2021 08:32) (66 - 86)  BP: 142/78 (09 Nov 2021 08:32) (142/78 - 179/104)  BP(mean): --  RR: 18 (09 Nov 2021 08:32) (16 - 18)  SpO2: 95% (09 Nov 2021 08:32) (93% - 99%)    I&O's Summary      CAPILLARY BLOOD GLUCOSE      POCT Blood Glucose.: 247 mg/dL (09 Nov 2021 11:50)  POCT Blood Glucose.: 252 mg/dL (09 Nov 2021 05:18)  POCT Blood Glucose.: 210 mg/dL (08 Nov 2021 23:01)  POCT Blood Glucose.: 200 mg/dL (08 Nov 2021 17:16)  POCT Blood Glucose.: 260 mg/dL (08 Nov 2021 13:14)      PHYSICAL EXAM:    Constitutional: NAD, awake and alert, obese   HEENT: PERR, EOMI, Normal Hearing, MMM  Neck: Soft and supple, No LAD, No JVD  Respiratory: Breath sounds are clear bilaterally, No wheezing, rales or rhonchi  Cardiovascular: S1 and S2, regular rate and rhythm, no Murmurs, gallops or rubs  Gastrointestinal: Bowel Sounds present, soft, nontender, nondistended, no guarding, no rebound. + ostomy   Extremities: No peripheral edema  Vascular: 2+ peripheral pulses  Neurological: A/O x 3, no focal deficits  Musculoskeletal: 5/5 strength b/l upper and lower extremities  Skin: No rashes    MEDICATIONS:  MEDICATIONS  (STANDING):  carvedilol 12.5 milliGRAM(s) Oral every 12 hours  dextrose 40% Gel 15 Gram(s) Oral once  dextrose 5%. 1000 milliLiter(s) (50 mL/Hr) IV Continuous <Continuous>  dextrose 5%. 1000 milliLiter(s) (100 mL/Hr) IV Continuous <Continuous>  dextrose 50% Injectable 25 Gram(s) IV Push once  dextrose 50% Injectable 12.5 Gram(s) IV Push once  dextrose 50% Injectable 25 Gram(s) IV Push once  enoxaparin Injectable 40 milliGRAM(s) SubCutaneous every 12 hours  furosemide    Tablet 20 milliGRAM(s) Oral daily  glucagon  Injectable 1 milliGRAM(s) IntraMuscular once  influenza   Vaccine 0.5 milliLiter(s) IntraMuscular once  insulin glargine Injectable (LANTUS) 30 Unit(s) SubCutaneous at bedtime  insulin lispro (ADMELOG) corrective regimen sliding scale   SubCutaneous three times a day before meals  lisinopril 20 milliGRAM(s) Oral daily  morphine  - Injectable 4 milliGRAM(s) IV Push once  nicotine -  14 mG/24Hr(s) Patch 1 patch Transdermal daily  pantoprazole  Injectable 40 milliGRAM(s) IV Push daily  polyethylene glycol/electrolyte Solution. 4000 milliLiter(s) Oral once  tamsulosin 0.4 milliGRAM(s) Oral at bedtime      LABS: All Labs Reviewed:                        13.5   10.77 )-----------( 337      ( 09 Nov 2021 08:10 )             42.3     11-09    136  |  103  |  10  ----------------------------<  233<H>  4.3   |  30  |  0.88    Ca    8.9      09 Nov 2021 08:10  Phos  3.0     11-09  Mg     2.3     11-09    TPro  6.9  /  Alb  2.7<L>  /  TBili  0.4  /  DBili  x   /  AST  26  /  ALT  33  /  AlkPhos  111  11-09    PT/INR - ( 09 Nov 2021 08:10 )   PT: 12.9 sec;   INR: 1.12 ratio    PTT - ( 09 Nov 2021 08:10 )  PTT:29.9 sec      RADIOLOGY/EKG:    < from: CT Abdomen and Pelvis w/ Oral Cont and w/ IV Cont (11.08.21 @ 13:18) >  ABDOMINAL WALL: There are bilateral fat-containing inguinal hernias right greater left. There is skin thickening and subcutaneous edema inthe lower anterior abdominal wall also similar to the prior study  BONES: Degenerative changes.    IMPRESSION: Diverticulitis of the sigmoid colon is again seen status post left lower quadrant colostomy. 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 Cannot rule out abscess formation in the anterior bladder wall. Findings are without significant change from the recent prior study    --- End of Report ---  [facility] MD; Attending Radiologist  This document has been electronically signed. Nov 8 2021  2:17PM    < end of copied text >

## 2021-11-09 NOTE — PROGRESS NOTE ADULT - SUBJECTIVE AND OBJECTIVE BOX
PROGRESS SURGERY NOTE:    Patient was seen and examined at bedside this am. Patient denies any acute complaint at this time. Patient is planned for a stress test today and a c-scope tomorrow.     MEDICATIONS  (STANDING):  carvedilol 12.5 milliGRAM(s) Oral every 12 hours  dextrose 40% Gel 15 Gram(s) Oral once  dextrose 5%. 1000 milliLiter(s) (50 mL/Hr) IV Continuous <Continuous>  dextrose 5%. 1000 milliLiter(s) (100 mL/Hr) IV Continuous <Continuous>  dextrose 50% Injectable 25 Gram(s) IV Push once  dextrose 50% Injectable 12.5 Gram(s) IV Push once  dextrose 50% Injectable 25 Gram(s) IV Push once  enoxaparin Injectable 40 milliGRAM(s) SubCutaneous every 12 hours  furosemide    Tablet 20 milliGRAM(s) Oral daily  glucagon  Injectable 1 milliGRAM(s) IntraMuscular once  influenza   Vaccine 0.5 milliLiter(s) IntraMuscular once  insulin glargine Injectable (LANTUS) 30 Unit(s) SubCutaneous at bedtime  insulin lispro (ADMELOG) corrective regimen sliding scale   SubCutaneous three times a day before meals  lactated ringers. 1000 milliLiter(s) (100 mL/Hr) IV Continuous <Continuous>  lisinopril 20 milliGRAM(s) Oral daily  morphine  - Injectable 4 milliGRAM(s) IV Push once  nicotine -  14 mG/24Hr(s) Patch 1 patch Transdermal daily  pantoprazole  Injectable 40 milliGRAM(s) IV Push daily  tamsulosin 0.4 milliGRAM(s) Oral at bedtime    MEDICATIONS  (PRN):  acetaminophen     Tablet .. 650 milliGRAM(s) Oral every 6 hours PRN Mild Pain (1 - 3)  ALBUTerol    90 MICROgram(s) HFA Inhaler 2 Puff(s) Inhalation every 6 hours PRN Bronchospasm  ketorolac   Injectable 15 milliGRAM(s) IV Push once PRN Moderate Pain (4 - 6)  morphine  - Injectable 4 milliGRAM(s) IV Push every 6 hours PRN Severe Pain (7 - 10)  ondansetron Injectable 4 milliGRAM(s) IV Push every 6 hours PRN Nausea and/or Vomiting  traZODone 50 milliGRAM(s) Oral at bedtime PRN sleep/anxiety    Vital Signs Last 24 Hrs  T(C): 37.1 (08 Nov 2021 22:48), Max: 37.1 (08 Nov 2021 22:48)  T(F): 98.8 (08 Nov 2021 22:48), Max: 98.8 (08 Nov 2021 22:48)  HR: 66 (08 Nov 2021 22:48) (66 - 88)  BP: 142/93 (08 Nov 2021 22:48) (142/93 - 179/104)  BP(mean): 120 (08 Nov 2021 10:34) (116 - 120)  RR: 18 (08 Nov 2021 22:48) (16 - 18)  SpO2: 93% (08 Nov 2021 22:48) (93% - 99%)    o/e:  AOx3, NAD  chest; non-labored breathing  heart; RRR s1s2  Abd: soft, obese, non-tender, left side colostomy in place with stool in bag  ext. b/l pitting edema, with redness in legs    LABS:                        13.5   10.77 )-----------( 337      ( 09 Nov 2021 08:10 )             42.3     08 Nov 2021 09:47    136    |  100    |  13     ----------------------------<  336    4.4     |  31     |  1.10     Ca    8.7        08 Nov 2021 09:47    TPro  7.2    /  Alb  2.8    /  TBili  0.4    /  DBili  x      /  AST  22     /  ALT  34     /  AlkPhos  126    08 Nov 2021 09:47    Hgb A1c: 11.4    < from: CT Abdomen and Pelvis w/ Oral Cont and w/ IV Cont (11.08.21 @ 13:18) >  EXAM:  CT ABDOMEN AND PELVIS OC IC                            PROCEDURE DATE:  11/08/2021          INTERPRETATION:  CLINICAL INFORMATION: Abdominal pain    COMPARISON: 11/2/2021    CONTRAST/COMPLICATIONS:  IV Contrast: Omnipaque 350  90 cc administered   10 cc discarded  Oral Contrast: Omnipaque 300  Complications: None reported at time of study completion    PROCEDURE:  CT of the Abdomen and Pelvis was performed.  Sagittal and coronal reformats were performed.    FINDINGS:  LOWER CHEST: Mitralannular calcification is appreciated    LIVER: Within normal limits.  BILE DUCTS: Normal caliber.  GALLBLADDER: Within normal limits.  SPLEEN: Within normal limits.  PANCREAS: Within normal limits.  ADRENALS: Within normal limits.  KIDNEYS/URETERS: Within normal limits.    BLADDER: Minimally distended. However anterior bladder wall is thickened and hypoechoic and focal abscess cannot be excluded.  REPRODUCTIVE ORGANS: Prostate within normal limits.    BOWEL: No bowel obstruction. Appendix is normal.. There is diverticulosis of the colon. Left lower quadrant colostomy is again seen. Suture line is seen in the proximal sigmoid colon. There is wall thickening in the sigmoid colon with stranding around the colon suggesting diverticulitis. 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  VESSELS: Mild atherosclerotic calcification of the aorta and its branches  RETROPERITONEUM/LYMPH NODES: Mild left para-aortic lymphadenopathy is again seen which may be reactive. Small lymph nodes are also seen in the mesentery at the level of the sigmoid diverticulitis.  ABDOMINAL WALL: There are bilateral fat-containing inguinal hernias right greater left. There is skin thickening and subcutaneous edema inthe lower anterior abdominal wall also similar to the prior study  BONES: Degenerative changes.    IMPRESSION: Diverticulitis of the sigmoid colon is again seen status post left lower quadrant colostomy. 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 Cannot rule out abscess formation in the anterior bladder wall. Findings are without significant change from the recent prior study    --- End of Report ---      [facility] MD; Attending Radiologist  This document has been electronically signed. Nov 8 2021  2:17PM    < end of copied text >

## 2021-11-09 NOTE — DIETITIAN INITIAL EVALUATION ADULT. - PERTINENT MEDS FT
MEDICATIONS  (STANDING):  carvedilol 12.5 milliGRAM(s) Oral every 12 hours  dextrose 40% Gel 15 Gram(s) Oral once  dextrose 5%. 1000 milliLiter(s) (50 mL/Hr) IV Continuous <Continuous>  dextrose 5%. 1000 milliLiter(s) (100 mL/Hr) IV Continuous <Continuous>  dextrose 50% Injectable 25 Gram(s) IV Push once  dextrose 50% Injectable 12.5 Gram(s) IV Push once  dextrose 50% Injectable 25 Gram(s) IV Push once  enoxaparin Injectable 40 milliGRAM(s) SubCutaneous every 12 hours  furosemide    Tablet 20 milliGRAM(s) Oral daily  glucagon  Injectable 1 milliGRAM(s) IntraMuscular once  influenza   Vaccine 0.5 milliLiter(s) IntraMuscular once  insulin glargine Injectable (LANTUS) 35 Unit(s) SubCutaneous at bedtime  insulin lispro (ADMELOG) corrective regimen sliding scale   SubCutaneous three times a day before meals  lisinopril 20 milliGRAM(s) Oral daily  metroNIDAZOLE    Tablet 500 milliGRAM(s) Oral <User Schedule>  morphine  - Injectable 4 milliGRAM(s) IV Push once  neomycin 1000 milliGRAM(s) Oral <User Schedule>  nicotine -  14 mG/24Hr(s) Patch 1 patch Transdermal daily  pantoprazole  Injectable 40 milliGRAM(s) IV Push daily  piperacillin/tazobactam IVPB.. 3.375 Gram(s) IV Intermittent every 8 hours  tamsulosin 0.4 milliGRAM(s) Oral at bedtime    MEDICATIONS  (PRN):  acetaminophen     Tablet .. 650 milliGRAM(s) Oral every 6 hours PRN Mild Pain (1 - 3)  ALBUTerol    90 MICROgram(s) HFA Inhaler 2 Puff(s) Inhalation every 6 hours PRN Bronchospasm  ketorolac   Injectable 15 milliGRAM(s) IV Push once PRN Moderate Pain (4 - 6)  morphine  - Injectable 4 milliGRAM(s) IV Push every 6 hours PRN Severe Pain (7 - 10)  ondansetron Injectable 4 milliGRAM(s) IV Push every 6 hours PRN Nausea and/or Vomiting  traZODone 50 milliGRAM(s) Oral at bedtime PRN sleep/anxiety

## 2021-11-09 NOTE — PROGRESS NOTE ADULT - SUBJECTIVE AND OBJECTIVE BOX
HPI (hospitalist);    57 yo M with PMH of DM, HTN, CHF, b/l LE swelling, Obesity(BMI 49), h/o diverticulitis/diverticular abscess   s/p left colostomy 07/2021 at [facility] was seen in the ED for abdominal pain.   Pt reports he is suffering from this colostomy bag and wants it reversed.   Denies fever or chills, n/v/d/c. Pt reports the colostomy is functional.    PMH: as above  PSH: left colostomy 7/2021  Allergy: NKDA  SH: + smoking cig daily for last 20 yrs, denies etOH or illicit drug (08 Nov 2021 12:23)    Admitted for reversal of L sided colostomy.  Pulmonary medicine also consulted for clearance GI consulted for colonoscopy  to be done prior to surgery planned for wed.  Pt denies any cardiac symptoms: chest pain, dyspnea, palpitaitons, syncope.  somewhat poor historian - reports h/o stress test in past year.  Reports an evaluation Dr. TATY Peace at [facility] who ordered this.  Called his office & they state stress test Sep '20, echo & LM Mar '21   was completed, will fax me this.  Also reviewed allscripts & I see he was evaluated by Dr. Drummond  w/ arian & stress & echo were ordered but he cancelled these tests.  Dr. Drummond was not aware of tests I mentioned above.    11/9/'21: no complaints overnight.      MEDICATIONS:  OUTPATIENT  Home Medications:  Admelog 100 units/mL injectable solution: Inject subcutaneously with meals per sliding scale as directed (08 Nov 2021 12:39)  ALPRAZolam 2 mg oral tablet: 1 tab(s) orally 2 times a day, As Needed (08 Nov 2021 12:39)  Aspirin Enteric Coated 81 mg oral delayed release tablet: 1 tab(s) orally once a day (08 Nov 2021 12:39)  atorvastatin 40 mg oral tablet: 1 tab(s) orally once a day (at bedtime) (08 Nov 2021 12:39)  carvedilol 12.5 mg oral tablet: 1 tab(s) orally 2 times a day (08 Nov 2021 12:39)  furosemide 40 mg oral tablet: 1 tab(s) orally once a day (08 Nov 2021 12:39)  lisinopril 20 mg oral tablet: 1 tab(s) orally once a day (08 Nov 2021 12:39)  metFORMIN 500 mg oral tablet: 1 tab(s) orally 2 times a day (08 Nov 2021 12:39)  Pfizer-BioNTech COVID-19 Vaccine PF 30 mcg/0.3 mL intramuscular suspension: 0.3 milliliter(s) intramuscular once  ***rec&#x27;d both doses*** (08 Nov 2021 12:39)  Semglee Prefilled Pen 100 units/mL subcutaneous solution: 50 unit(s) subcutaneously once a day (at bedtime) (08 Nov 2021 12:39)  tamsulosin 0.4 mg oral capsule: 1 cap(s) orally once a day (08 Nov 2021 12:39)      INPATIENT  MEDICATIONS  (STANDING):  carvedilol 12.5 milliGRAM(s) Oral every 12 hours  dextrose 40% Gel 15 Gram(s) Oral once  dextrose 5%. 1000 milliLiter(s) (50 mL/Hr) IV Continuous <Continuous>  dextrose 5%. 1000 milliLiter(s) (100 mL/Hr) IV Continuous <Continuous>  dextrose 50% Injectable 25 Gram(s) IV Push once  dextrose 50% Injectable 12.5 Gram(s) IV Push once  dextrose 50% Injectable 25 Gram(s) IV Push once  enoxaparin Injectable 40 milliGRAM(s) SubCutaneous every 12 hours  furosemide    Tablet 20 milliGRAM(s) Oral daily  glucagon  Injectable 1 milliGRAM(s) IntraMuscular once  influenza   Vaccine 0.5 milliLiter(s) IntraMuscular once  insulin glargine Injectable (LANTUS) 35 Unit(s) SubCutaneous at bedtime  insulin lispro (ADMELOG) corrective regimen sliding scale   SubCutaneous three times a day before meals  lisinopril 20 milliGRAM(s) Oral daily  morphine  - Injectable 4 milliGRAM(s) IV Push once  nicotine -  14 mG/24Hr(s) Patch 1 patch Transdermal daily  pantoprazole  Injectable 40 milliGRAM(s) IV Push daily  piperacillin/tazobactam IVPB. 3.375 Gram(s) IV Intermittent once  piperacillin/tazobactam IVPB.. 3.375 Gram(s) IV Intermittent every 8 hours  polyethylene glycol/electrolyte Solution. 4000 milliLiter(s) Oral once  tamsulosin 0.4 milliGRAM(s) Oral at bedtime    MEDICATIONS  (PRN):  acetaminophen     Tablet .. 650 milliGRAM(s) Oral every 6 hours PRN Mild Pain (1 - 3)  ALBUTerol    90 MICROgram(s) HFA Inhaler 2 Puff(s) Inhalation every 6 hours PRN Bronchospasm  ketorolac   Injectable 15 milliGRAM(s) IV Push once PRN Moderate Pain (4 - 6)  morphine  - Injectable 4 milliGRAM(s) IV Push every 6 hours PRN Severe Pain (7 - 10)  ondansetron Injectable 4 milliGRAM(s) IV Push every 6 hours PRN Nausea and/or Vomiting  traZODone 50 milliGRAM(s) Oral at bedtime PRN sleep/anxiety          Vital Signs Last 24 Hrs  T(C): 36.3 (09 Nov 2021 16:09), Max: 37.1 (08 Nov 2021 22:48)  T(F): 97.4 (09 Nov 2021 16:09), Max: 98.8 (08 Nov 2021 22:48)  HR: 63 (09 Nov 2021 16:09) (63 - 71)  BP: 146/79 (09 Nov 2021 16:09) (142/78 - 146/79)  BP(mean): --  RR: 18 (09 Nov 2021 16:09) (18 - 18)  SpO2: 94% (09 Nov 2021 16:09) (93% - 95%)Daily     Daily I&O's Summary      I&O's Detail      I&O's Summary      PHYSICAL EXAM:    Constitutional: NAD, awake and alert, obese  HEENT: PERR, EOMI,  No oral cyananosis.  Neck:  supple,  No JVD  Respiratory: Breath sounds are clear bilaterally, No wheezing, rales or rhonchi  Cardiovascular: S1 and S2, regular rate and rhythm, no Murmurs, gallops or rubs  Gastrointestinal: Bowel Sounds present, soft, nontender.   Extremities: No peripheral edema. No clubbing or cyanosis.  Vascular: 2+ peripheral pulses  Neurological: A/O x 3, no focal deficits        LABS: All Labs Reviewed:                        13.5   10.77 )-----------( 337      ( 09 Nov 2021 08:10 )             42.3                         13.0   10.78 )-----------( 311      ( 08 Nov 2021 09:47 )             41.6     09 Nov 2021 08:10    136    |  103    |  10     ----------------------------<  233    4.3     |  30     |  0.88   08 Nov 2021 09:47    136    |  100    |  13     ----------------------------<  336    4.4     |  31     |  1.10     Ca    8.9        09 Nov 2021 08:10  Ca    8.7        08 Nov 2021 09:47  Phos  3.0       09 Nov 2021 08:10  Mg     2.3       09 Nov 2021 08:10    TPro  6.9    /  Alb  2.7    /  TBili  0.4    /  DBili  x      /  AST  26     /  ALT  33     /  AlkPhos  111    09 Nov 2021 08:10  TPro  7.2    /  Alb  2.8    /  TBili  0.4    /  DBili  x      /  AST  22     /  ALT  34     /  AlkPhos  126    08 Nov 2021 09:47    PT/INR - ( 09 Nov 2021 08:10 )   PT: 12.9 sec;   INR: 1.12 ratio         PTT - ( 09 Nov 2021 08:10 )  PTT:29.9 sec    ===============================  EKG:  NSR, RBBB, nonspecific T wave inversions in precordial leads.  ===============================    Artur Powers M.D.  Cardiology, [facility]  Cell: [phone]  Office:   [phone] ([facility] Office)  [phone] ([facility] Office)

## 2021-11-09 NOTE — PROGRESS NOTE ADULT - ASSESSMENT
A/P:  57 yo M with multiple comorbidities, including diverticulitis s/p left side colostomy  pending reversal    Plan:   c/w diet  GI recs appreciated, colonoscopy planned for tomorrow  endocrine consult for uncontrolled DM, Last A1C 11.4  cardiology, pulmonary, medicine consult for surgical clearances  pain control  start home meds  GI/DVT prop    Plan d/w colorectal team and Dr. Lagos

## 2021-11-10 LAB
ALBUMIN SERPL ELPH-MCNC: 2.9 G/DL — LOW (ref 3.3–5)
ALP SERPL-CCNC: 116 U/L — SIGNIFICANT CHANGE UP (ref 40–120)
ALT FLD-CCNC: 34 U/L — SIGNIFICANT CHANGE UP (ref 12–78)
ANION GAP SERPL CALC-SCNC: 4 MMOL/L — LOW (ref 5–17)
AST SERPL-CCNC: 26 U/L — SIGNIFICANT CHANGE UP (ref 15–37)
BILIRUB SERPL-MCNC: 0.5 MG/DL — SIGNIFICANT CHANGE UP (ref 0.2–1.2)
BUN SERPL-MCNC: 8 MG/DL — SIGNIFICANT CHANGE UP (ref 7–23)
CALCIUM SERPL-MCNC: 9.1 MG/DL — SIGNIFICANT CHANGE UP (ref 8.5–10.1)
CEA SERPL-MCNC: 5.3 NG/ML — HIGH (ref 0–3.8)
CHLORIDE SERPL-SCNC: 102 MMOL/L — SIGNIFICANT CHANGE UP (ref 96–108)
CO2 SERPL-SCNC: 33 MMOL/L — HIGH (ref 22–31)
CREAT SERPL-MCNC: 1.09 MG/DL — SIGNIFICANT CHANGE UP (ref 0.5–1.3)
GLUCOSE SERPL-MCNC: 154 MG/DL — HIGH (ref 70–99)
HCT VFR BLD CALC: 45.7 % — SIGNIFICANT CHANGE UP (ref 39–50)
HGB BLD-MCNC: 14.1 G/DL — SIGNIFICANT CHANGE UP (ref 13–17)
INR BLD: 1.07 RATIO — SIGNIFICANT CHANGE UP (ref 0.88–1.16)
MAGNESIUM SERPL-MCNC: 2.2 MG/DL — SIGNIFICANT CHANGE UP (ref 1.6–2.6)
MCHC RBC-ENTMCNC: 26.8 PG — LOW (ref 27–34)
MCHC RBC-ENTMCNC: 30.9 GM/DL — LOW (ref 32–36)
MCV RBC AUTO: 86.7 FL — SIGNIFICANT CHANGE UP (ref 80–100)
PHOSPHATE SERPL-MCNC: 3.7 MG/DL — SIGNIFICANT CHANGE UP (ref 2.5–4.5)
PLATELET # BLD AUTO: 383 K/UL — SIGNIFICANT CHANGE UP (ref 150–400)
POTASSIUM SERPL-MCNC: 3.9 MMOL/L — SIGNIFICANT CHANGE UP (ref 3.5–5.3)
POTASSIUM SERPL-SCNC: 3.9 MMOL/L — SIGNIFICANT CHANGE UP (ref 3.5–5.3)
PROT SERPL-MCNC: 7.5 GM/DL — SIGNIFICANT CHANGE UP (ref 6–8.3)
PROTHROM AB SERPL-ACNC: 12.5 SEC — SIGNIFICANT CHANGE UP (ref 10.6–13.6)
RBC # BLD: 5.27 M/UL — SIGNIFICANT CHANGE UP (ref 4.2–5.8)
RBC # FLD: 14.2 % — SIGNIFICANT CHANGE UP (ref 10.3–14.5)
SODIUM SERPL-SCNC: 139 MMOL/L — SIGNIFICANT CHANGE UP (ref 135–145)
WBC # BLD: 7.98 K/UL — SIGNIFICANT CHANGE UP (ref 3.8–10.5)
WBC # FLD AUTO: 7.98 K/UL — SIGNIFICANT CHANGE UP (ref 3.8–10.5)

## 2021-11-10 PROCEDURE — 44388 COLONOSCOPY THRU STOMA SPX: CPT

## 2021-11-10 PROCEDURE — 99232 SBSQ HOSP IP/OBS MODERATE 35: CPT

## 2021-11-10 PROCEDURE — 78452 HT MUSCLE IMAGE SPECT MULT: CPT | Mod: 26

## 2021-11-10 PROCEDURE — 99233 SBSQ HOSP IP/OBS HIGH 50: CPT

## 2021-11-10 RX ORDER — NEOMYCIN SULFATE 500 MG/1
1000 TABLET ORAL
Refills: 0 | Status: COMPLETED | OUTPATIENT
Start: 2021-11-10 | End: 2021-11-10

## 2021-11-10 RX ORDER — NEOMYCIN SULFATE 500 MG/1
1000 TABLET ORAL
Refills: 0 | Status: DISCONTINUED | OUTPATIENT
Start: 2021-11-10 | End: 2021-11-10

## 2021-11-10 RX ORDER — SOD SULF/SODIUM/NAHCO3/KCL/PEG
1000 SOLUTION, RECONSTITUTED, ORAL ORAL AT BEDTIME
Refills: 0 | Status: DISCONTINUED | OUTPATIENT
Start: 2021-11-11 | End: 2021-11-11

## 2021-11-10 RX ORDER — SOD SULF/SODIUM/NAHCO3/KCL/PEG
1000 SOLUTION, RECONSTITUTED, ORAL ORAL ONCE
Refills: 0 | Status: COMPLETED | OUTPATIENT
Start: 2021-11-10 | End: 2021-11-10

## 2021-11-10 RX ORDER — METRONIDAZOLE 500 MG
500 TABLET ORAL
Refills: 0 | Status: COMPLETED | OUTPATIENT
Start: 2021-11-10 | End: 2021-11-10

## 2021-11-10 RX ORDER — SOD SULF/SODIUM/NAHCO3/KCL/PEG
4000 SOLUTION, RECONSTITUTED, ORAL ORAL ONCE
Refills: 0 | Status: DISCONTINUED | OUTPATIENT
Start: 2021-11-10 | End: 2021-11-10

## 2021-11-10 RX ORDER — METRONIDAZOLE 500 MG
500 TABLET ORAL
Refills: 0 | Status: DISCONTINUED | OUTPATIENT
Start: 2021-11-10 | End: 2021-11-10

## 2021-11-10 RX ORDER — SOD SULF/SODIUM/NAHCO3/KCL/PEG
SOLUTION, RECONSTITUTED, ORAL ORAL
Refills: 0 | Status: DISCONTINUED | OUTPATIENT
Start: 2021-11-10 | End: 2021-11-11

## 2021-11-10 RX ADMIN — Medication 2: at 05:13

## 2021-11-10 RX ADMIN — CARVEDILOL PHOSPHATE 12.5 MILLIGRAM(S): 80 CAPSULE, EXTENDED RELEASE ORAL at 21:41

## 2021-11-10 RX ADMIN — ENOXAPARIN SODIUM 40 MILLIGRAM(S): 100 INJECTION SUBCUTANEOUS at 11:25

## 2021-11-10 RX ADMIN — CARVEDILOL PHOSPHATE 12.5 MILLIGRAM(S): 80 CAPSULE, EXTENDED RELEASE ORAL at 11:25

## 2021-11-10 RX ADMIN — PIPERACILLIN AND TAZOBACTAM 25 GRAM(S): 4; .5 INJECTION, POWDER, LYOPHILIZED, FOR SOLUTION INTRAVENOUS at 14:55

## 2021-11-10 RX ADMIN — PIPERACILLIN AND TAZOBACTAM 25 GRAM(S): 4; .5 INJECTION, POWDER, LYOPHILIZED, FOR SOLUTION INTRAVENOUS at 21:42

## 2021-11-10 RX ADMIN — Medication 500 MILLIGRAM(S): at 14:55

## 2021-11-10 RX ADMIN — Medication 20 MILLIGRAM(S): at 11:27

## 2021-11-10 RX ADMIN — PIPERACILLIN AND TAZOBACTAM 25 GRAM(S): 4; .5 INJECTION, POWDER, LYOPHILIZED, FOR SOLUTION INTRAVENOUS at 05:11

## 2021-11-10 RX ADMIN — Medication 2: at 16:43

## 2021-11-10 RX ADMIN — PANTOPRAZOLE SODIUM 40 MILLIGRAM(S): 20 TABLET, DELAYED RELEASE ORAL at 11:26

## 2021-11-10 RX ADMIN — Medication 500 MILLIGRAM(S): at 21:41

## 2021-11-10 RX ADMIN — NEOMYCIN SULFATE 1000 MILLIGRAM(S): 500 TABLET ORAL at 14:55

## 2021-11-10 RX ADMIN — NEOMYCIN SULFATE 1000 MILLIGRAM(S): 500 TABLET ORAL at 11:38

## 2021-11-10 RX ADMIN — LISINOPRIL 20 MILLIGRAM(S): 2.5 TABLET ORAL at 11:26

## 2021-11-10 RX ADMIN — ENOXAPARIN SODIUM 40 MILLIGRAM(S): 100 INJECTION SUBCUTANEOUS at 21:41

## 2021-11-10 RX ADMIN — TAMSULOSIN HYDROCHLORIDE 0.4 MILLIGRAM(S): 0.4 CAPSULE ORAL at 21:41

## 2021-11-10 RX ADMIN — INSULIN GLARGINE 35 UNIT(S): 100 INJECTION, SOLUTION SUBCUTANEOUS at 21:41

## 2021-11-10 RX ADMIN — NEOMYCIN SULFATE 1000 MILLIGRAM(S): 500 TABLET ORAL at 21:41

## 2021-11-10 RX ADMIN — Medication 1000 MILLILITER(S): at 21:55

## 2021-11-10 RX ADMIN — Medication 500 MILLIGRAM(S): at 11:28

## 2021-11-10 NOTE — PROGRESS NOTE ADULT - ASSESSMENT
57 y/o male with hx of significant diverticulitis s/p recent diverting loop colostomy with continued abdominal discomfort    PLAN  Bowel prep for colonoscopy today  NPO   Obtain cardiac medical clearance  Control pain PRN    Discussed with Dr. Mckeon

## 2021-11-10 NOTE — PROGRESS NOTE ADULT - SUBJECTIVE AND OBJECTIVE BOX
Subjective:    pat better, sitting in bed, waiting for surgery, no respiratory complaint.    Home Medications:  Admelog 100 units/mL injectable solution: Inject subcutaneously with meals per sliding scale as directed (08 Nov 2021 12:39)  ALPRAZolam 2 mg oral tablet: 1 tab(s) orally 2 times a day, As Needed (08 Nov 2021 12:39)  Aspirin Enteric Coated 81 mg oral delayed release tablet: 1 tab(s) orally once a day (08 Nov 2021 12:39)  atorvastatin 40 mg oral tablet: 1 tab(s) orally once a day (at bedtime) (08 Nov 2021 12:39)  carvedilol 12.5 mg oral tablet: 1 tab(s) orally 2 times a day (08 Nov 2021 12:39)  furosemide 40 mg oral tablet: 1 tab(s) orally once a day (08 Nov 2021 12:39)  lisinopril 20 mg oral tablet: 1 tab(s) orally once a day (08 Nov 2021 12:39)  metFORMIN 500 mg oral tablet: 1 tab(s) orally 2 times a day (08 Nov 2021 12:39)  Pfizer-BioNTech COVID-19 Vaccine PF 30 mcg/0.3 mL intramuscular suspension: 0.3 milliliter(s) intramuscular once  ***rec&#x27;d both doses*** (08 Nov 2021 12:39)  Semglee Prefilled Pen 100 units/mL subcutaneous solution: 50 unit(s) subcutaneously once a day (at bedtime) (08 Nov 2021 12:39)  tamsulosin 0.4 mg oral capsule: 1 cap(s) orally once a day (08 Nov 2021 12:39)    MEDICATIONS  (STANDING):  carvedilol 12.5 milliGRAM(s) Oral every 12 hours  dextrose 40% Gel 15 Gram(s) Oral once  dextrose 5%. 1000 milliLiter(s) (50 mL/Hr) IV Continuous <Continuous>  dextrose 5%. 1000 milliLiter(s) (100 mL/Hr) IV Continuous <Continuous>  dextrose 50% Injectable 25 Gram(s) IV Push once  dextrose 50% Injectable 12.5 Gram(s) IV Push once  dextrose 50% Injectable 25 Gram(s) IV Push once  enoxaparin Injectable 40 milliGRAM(s) SubCutaneous every 12 hours  furosemide    Tablet 20 milliGRAM(s) Oral daily  glucagon  Injectable 1 milliGRAM(s) IntraMuscular once  influenza   Vaccine 0.5 milliLiter(s) IntraMuscular once  insulin glargine Injectable (LANTUS) 35 Unit(s) SubCutaneous at bedtime  insulin lispro (ADMELOG) corrective regimen sliding scale   SubCutaneous three times a day before meals  lisinopril 20 milliGRAM(s) Oral daily  metroNIDAZOLE    Tablet 500 milliGRAM(s) Oral <User Schedule>  morphine  - Injectable 4 milliGRAM(s) IV Push once  neomycin 1000 milliGRAM(s) Oral <User Schedule>  nicotine -  14 mG/24Hr(s) Patch 1 patch Transdermal daily  pantoprazole  Injectable 40 milliGRAM(s) IV Push daily  piperacillin/tazobactam IVPB.. 3.375 Gram(s) IV Intermittent every 8 hours  tamsulosin 0.4 milliGRAM(s) Oral at bedtime    MEDICATIONS  (PRN):  acetaminophen     Tablet .. 650 milliGRAM(s) Oral every 6 hours PRN Mild Pain (1 - 3)  ALBUTerol    90 MICROgram(s) HFA Inhaler 2 Puff(s) Inhalation every 6 hours PRN Bronchospasm  ketorolac   Injectable 15 milliGRAM(s) IV Push once PRN Moderate Pain (4 - 6)  morphine  - Injectable 4 milliGRAM(s) IV Push every 6 hours PRN Severe Pain (7 - 10)  ondansetron Injectable 4 milliGRAM(s) IV Push every 6 hours PRN Nausea and/or Vomiting  traZODone 50 milliGRAM(s) Oral at bedtime PRN sleep/anxiety      Allergies    No Known Allergies    Intolerances        Vital Signs Last 24 Hrs  T(C): 36.8 (10 Nov 2021 07:42), Max: 36.8 (09 Nov 2021 21:23)  T(F): 98.3 (10 Nov 2021 07:42), Max: 98.3 (10 Nov 2021 07:42)  HR: 68 (10 Nov 2021 07:42) (63 - 83)  BP: 156/83 (10 Nov 2021 07:42) (146/79 - 156/83)  BP(mean): --  RR: 18 (10 Nov 2021 07:42) (18 - 18)  SpO2: 94% (10 Nov 2021 07:42) (94% - 95%)      PHYSICAL EXAMINATION:    NECK:  Supple. No lymphadenopathy. Jugular venous pressure not elevated. Carotids equal.   HEART:   The cardiac impulse has a normal quality. Reg., Nl S1 and S2.  There are no murmurs, rubs or gallops noted  CHEST:  Chest crackles to auscultation. Normal respiratory effort.  ABDOMEN:  Soft and nontender.   EXTREMITIES:  There is no edema.       LABS:                        14.1   7.98  )-----------( 383      ( 10 Nov 2021 08:24 )             45.7     11-10    139  |  102  |  8   ----------------------------<  154<H>  3.9   |  33<H>  |  1.09    Ca    9.1      10 Nov 2021 08:24  Phos  3.7     11-10  Mg     2.2     11-10    TPro  7.5  /  Alb  2.9<L>  /  TBili  0.5  /  DBili  x   /  AST  26  /  ALT  34  /  AlkPhos  116  11-10    PT/INR - ( 10 Nov 2021 08:24 )   PT: 12.5 sec;   INR: 1.07 ratio         PTT - ( 09 Nov 2021 08:10 )  PTT:29.9 sec

## 2021-11-10 NOTE — PROGRESS NOTE ADULT - ASSESSMENT
A/P:  57 yo M with multiple comorbidities h/o of DM, HTN, CHF, b/l LE swelling, Obesity(BMI 49), h/o smoking, possible RAFFI & h/o diverticulitis/diverticular abscess s/p left colostomy 07/2021 with left side colostomy waiting reversal    Plan:     pulmonary optimizized, there is no absolute contra-indication for the plan procedure watch for any apneic episode postop  incentive scotty  sleep study as outpat  aerosols  nicotine patch  pain control  GI/DVT prop

## 2021-11-10 NOTE — BRIEF OPERATIVE NOTE - OPERATION/FINDINGS
Scope via ostomy to ileum without mass lesion or large polyp.   Friesland's pouch with inflammatory changes but no mass or large polyp.

## 2021-11-10 NOTE — PROGRESS NOTE ADULT - SUBJECTIVE AND OBJECTIVE BOX
55 yo M with PMH of DM, HTN, CHF, b/l LE swelling, Obesity(BMI 49), h/o diverticulitis/diverticular abscess s/p [facility] in [facility] for abdominal pain. Pt reports he is suffering from this colostomy bag and wants it reversed. Denies fever or chills, n/v/d/c. Pt reports the colostomy is functional.    PMH: as above  PSH: left colostomy 7/2021  Allergy: NKDA  SH: + smoking cig daily for last 20 yrs, denies etOH or illicit drug (08 Nov 2021 12:23)    11/9: sitting up in bed, pain and discomfort improving. no fever or diarrhea today. plan for colonoscopy in AM   11/10: sitting up at edge of bed, reports no pain or discomfort, BGMs improving, plan for ostomy reversal with new ostomy in AM.     REVIEW OF SYSTEMS:    CONSTITUTIONAL: No weakness, fevers or chills  EYES/ENT: No visual changes;  No vertigo or throat pain   NECK: No pain or stiffness  RESPIRATORY: No cough, wheezing, hemoptysis; No shortness of breath  CARDIOVASCULAR: No chest pain or palpitations  GASTROINTESTINAL: No abdominal or epigastric pain. No nausea, vomiting, or hematemesis; No diarrhea or constipation. No melena or hematochezia.  GENITOURINARY: No dysuria, frequency or hematuria  NEUROLOGICAL: No numbness or weakness  SKIN: No itching, burning, rashes, or lesions   All other review of systems is negative unless indicated above    Vital Signs Last 24 Hrs  T(C): 36.6 (10 Nov 2021 15:07), Max: 36.8 (09 Nov 2021 21:23)  T(F): 97.9 (10 Nov 2021 15:07), Max: 98.3 (10 Nov 2021 07:42)  HR: 65 (10 Nov 2021 15:07) (63 - 83)  BP: 143/74 (10 Nov 2021 15:07) (143/74 - 156/83)  BP(mean): --  RR: 18 (10 Nov 2021 15:07) (18 - 18)  SpO2: 92% (10 Nov 2021 15:07) (92% - 95%)    I&O's Summary      CAPILLARY BLOOD GLUCOSE      POCT Blood Glucose.: 247 mg/dL (09 Nov 2021 11:50)  POCT Blood Glucose.: 252 mg/dL (09 Nov 2021 05:18)  POCT Blood Glucose.: 210 mg/dL (08 Nov 2021 23:01)  POCT Blood Glucose.: 200 mg/dL (08 Nov 2021 17:16)  POCT Blood Glucose.: 260 mg/dL (08 Nov 2021 13:14)      PHYSICAL EXAM:    Constitutional: NAD, awake and alert, obese   HEENT: PERR, EOMI, Normal Hearing, MMM  Neck: Soft and supple, No LAD, No JVD  Respiratory: Breath sounds are clear bilaterally, No wheezing, rales or rhonchi  Cardiovascular: S1 and S2, regular rate and rhythm, no Murmurs, gallops or rubs  Gastrointestinal: Bowel Sounds present, soft, nontender, nondistended, no guarding, no rebound. + ostomy with stool   Extremities: No peripheral edema  Vascular: 2+ peripheral pulses  Neurological: A/O x 3, no focal deficits  Musculoskeletal: 5/5 strength b/l upper and lower extremities  Skin: No rashes    MEDICATIONS  (STANDING):  carvedilol 12.5 milliGRAM(s) Oral every 12 hours  dextrose 40% Gel 15 Gram(s) Oral once  dextrose 5%. 1000 milliLiter(s) (50 mL/Hr) IV Continuous <Continuous>  dextrose 5%. 1000 milliLiter(s) (100 mL/Hr) IV Continuous <Continuous>  dextrose 50% Injectable 25 Gram(s) IV Push once  dextrose 50% Injectable 12.5 Gram(s) IV Push once  dextrose 50% Injectable 25 Gram(s) IV Push once  enoxaparin Injectable 40 milliGRAM(s) SubCutaneous every 12 hours  furosemide    Tablet 20 milliGRAM(s) Oral daily  glucagon  Injectable 1 milliGRAM(s) IntraMuscular once  influenza   Vaccine 0.5 milliLiter(s) IntraMuscular once  insulin glargine Injectable (LANTUS) 35 Unit(s) SubCutaneous at bedtime  insulin lispro (ADMELOG) corrective regimen sliding scale   SubCutaneous three times a day before meals  lisinopril 20 milliGRAM(s) Oral daily  metroNIDAZOLE    Tablet 500 milliGRAM(s) Oral <User Schedule>  morphine  - Injectable 4 milliGRAM(s) IV Push once  neomycin 1000 milliGRAM(s) Oral <User Schedule>  nicotine -  14 mG/24Hr(s) Patch 1 patch Transdermal daily  pantoprazole  Injectable 40 milliGRAM(s) IV Push daily  piperacillin/tazobactam IVPB.. 3.375 Gram(s) IV Intermittent every 8 hours  tamsulosin 0.4 milliGRAM(s) Oral at bedtime      LABS: All Labs Reviewed:                               14.1   7.98  )-----------( 383      ( 10 Nov 2021 08:24 )             45.7     11-10    139  |  102  |  8   ----------------------------<  154<H>  3.9   |  33<H>  |  1.09    Ca    9.1      10 Nov 2021 08:24  Phos  3.7     11-10  Mg     2.2     11-10    TPro  7.5  /  Alb  2.9<L>  /  TBili  0.5  /  DBili  x   /  AST  26  /  ALT  34  /  AlkPhos  116  11-10    LIVER FUNCTIONS - ( 10 Nov 2021 08:24 )  Alb: 2.9 g/dL / Pro: 7.5 gm/dL / ALK PHOS: 116 U/L / ALT: 34 U/L / AST: 26 U/L / GGT: x           PT/INR - ( 10 Nov 2021 08:24 )   PT: 12.5 sec;   INR: 1.07 ratio PTT - ( 09 Nov 2021 08:10 )  PTT:29.9 sec                       RADIOLOGY/EKG:    < from: CT Abdomen and Pelvis w/ Oral Cont and w/ IV Cont (11.08.21 @ 13:18) >  ABDOMINAL WALL: There are bilateral fat-containing inguinal hernias right greater left. There is skin thickening and subcutaneous edema inthe lower anterior abdominal wall also similar to the prior study  BONES: Degenerative changes.    IMPRESSION: Diverticulitis of the sigmoid colon is again seen status post left lower quadrant colostomy. 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 Cannot rule out abscess formation in the anterior bladder wall. Findings are without significant change from the recent prior study    --- End of Report ---  [facility]IS[facility] AYOUB MD; Attending Radiologist  This document has been electronically signed. Nov 8 2021  2:17PM    COLONOSCOPY   Scope via ostomy to ileum without mass lesion or large polyp.   Dave's pouch with inflammatory changes but no mass or large polyp.

## 2021-11-10 NOTE — PROGRESS NOTE ADULT - SUBJECTIVE AND OBJECTIVE BOX
HPI:  55 y/o male with multiple medical comorbidities, s/p left colostomy 07/2021 at [facility] due to diverticular abscess presenting c/o diffuse abdominal pain. At time of eval patient taking bowel prep for planned colonoscopy this AM. Tolerating prep well. Has "a little nausea, nothing crazy." Ostomy output is liquid yellow. At this time denies fever, chills, vomiting, constipation. No sick contacts or travel history.     PAST MEDICAL & SURGICAL HISTORY:  HTN (hypertension)  Diabetes  Diverticulitis  S/P colostomy  left  Obese  S/P colostomy  left 7/2021    MEDICATIONS  (STANDING):  carvedilol 12.5 milliGRAM(s) Oral every 12 hours  dextrose 40% Gel 15 Gram(s) Oral once  dextrose 5%. 1000 milliLiter(s) (50 mL/Hr) IV Continuous <Continuous>  dextrose 5%. 1000 milliLiter(s) (100 mL/Hr) IV Continuous <Continuous>  dextrose 50% Injectable 25 Gram(s) IV Push once  dextrose 50% Injectable 12.5 Gram(s) IV Push once  dextrose 50% Injectable 25 Gram(s) IV Push once  enoxaparin Injectable 40 milliGRAM(s) SubCutaneous every 12 hours  furosemide    Tablet 20 milliGRAM(s) Oral daily  glucagon  Injectable 1 milliGRAM(s) IntraMuscular once  influenza   Vaccine 0.5 milliLiter(s) IntraMuscular once  insulin glargine Injectable (LANTUS) 35 Unit(s) SubCutaneous at bedtime  insulin lispro (ADMELOG) corrective regimen sliding scale   SubCutaneous three times a day before meals  lisinopril 20 milliGRAM(s) Oral daily  metroNIDAZOLE    Tablet 500 milliGRAM(s) Oral <User Schedule>  morphine  - Injectable 4 milliGRAM(s) IV Push once  neomycin 1000 milliGRAM(s) Oral <User Schedule>  nicotine -  14 mG/24Hr(s) Patch 1 patch Transdermal daily  pantoprazole  Injectable 40 milliGRAM(s) IV Push daily  piperacillin/tazobactam IVPB.. 3.375 Gram(s) IV Intermittent every 8 hours  tamsulosin 0.4 milliGRAM(s) Oral at bedtime    MEDICATIONS  (PRN):  acetaminophen     Tablet .. 650 milliGRAM(s) Oral every 6 hours PRN Mild Pain (1 - 3)  ALBUTerol    90 MICROgram(s) HFA Inhaler 2 Puff(s) Inhalation every 6 hours PRN Bronchospasm  ketorolac   Injectable 15 milliGRAM(s) IV Push once PRN Moderate Pain (4 - 6)  morphine  - Injectable 4 milliGRAM(s) IV Push every 6 hours PRN Severe Pain (7 - 10)  ondansetron Injectable 4 milliGRAM(s) IV Push every 6 hours PRN Nausea and/or Vomiting  traZODone 50 milliGRAM(s) Oral at bedtime PRN sleep/anxiety    Allergies  No Known Allergies    SOCIAL HISTORY:  FAMILY HISTORY:  FH: HTN (hypertension) (Father)    REVIEW OF SYSTEMS:  CONSTITUTIONAL: No weakness, fevers or chills  EYES/ENT: No visual changes;  No vertigo or throat pain   NECK: No pain or stiffness  RESPIRATORY: No cough, wheezing, hemoptysis; No shortness of breath  CARDIOVASCULAR: No chest pain or palpitations  GASTROINTESTINAL: + mild abdominal pain, mild nausea No vomiting, or hematemesis; No constipation. No melena or hematochezia.  GENITOURINARY: No dysuria, frequency or hematuria  NEUROLOGICAL: No numbness or weakness  SKIN: No itching, burning, rashes, or lesions   PSYCH: Normal mood and affect  All other review of systems is negative unless indicated above.    Vital Signs Last 24 Hrs  T(C): 36.8 (10 Nov 2021 07:42), Max: 36.8 (09 Nov 2021 21:23)  T(F): 98.3 (10 Nov 2021 07:42), Max: 98.3 (10 Nov 2021 07:42)  HR: 68 (10 Nov 2021 07:42) (63 - 83)  BP: 156/83 (10 Nov 2021 07:42) (146/79 - 156/83)  BP(mean): --  RR: 18 (10 Nov 2021 07:42) (18 - 18)  SpO2: 94% (10 Nov 2021 07:42) (94% - 95%)    PHYSICAL EXAM:  Constitutional: NAD, well-developed  Respiratory: CTAB  Cardiovascular: S1 and S2, RRR, no M/G/R  Gastrointestinal: BS+, soft,  obese, mildly tender in all 4 quadrants, no rigidity, colostomy to LLQ has liquid yellow output.   Extremities: No peripheral edema  Vascular: 2+ peripheral pulses  Neurological: A/O x 3, no focal deficits  Psychiatric: Normal mood, normal affect  Skin: No rashes    LABS:                        14.1   7.98  )-----------( 383      ( 10 Nov 2021 08:24 )             45.7     11-10    139  |  102  |  8   ----------------------------<  154<H>  3.9   |  33<H>  |  1.09    Ca    9.1      10 Nov 2021 08:24  Phos  3.7     11-10  Mg     2.2     11-10    TPro  7.5  /  Alb  2.9<L>  /  TBili  0.5  /  DBili  x   /  AST  26  /  ALT  34  /  AlkPhos  116  11-10    PT/INR - ( 10 Nov 2021 08:24 )   PT: 12.5 sec;   INR: 1.07 ratio         PTT - ( 09 Nov 2021 08:10 )  PTT:29.9 sec  LIVER FUNCTIONS - ( 10 Nov 2021 08:24 )  Alb: 2.9 g/dL / Pro: 7.5 gm/dL / ALK PHOS: 116 U/L / ALT: 34 U/L / AST: 26 U/L / GGT: x             RADIOLOGY & ADDITIONAL STUDIES:  EXAM:  CT ABDOMEN AND PELVIS OC IC                        PROCEDURE DATE:  11/08/2021      INTERPRETATION:  CLINICAL INFORMATION: Abdominal pain  COMPARISON: 11/2/2021    CONTRAST/COMPLICATIONS:  IV Contrast: Omnipaque 350  90 cc administered   10 cc discarded  Oral Contrast: Omnipaque 300  Complications: None reported at time of study completion    PROCEDURE:  CT of the Abdomen and Pelvis was performed.  Sagittal and coronal reformats were performed.    FINDINGS:  LOWER CHEST: Mitralannular calcification is appreciated  LIVER: Within normal limits.  BILE DUCTS: Normal caliber.  GALLBLADDER: Within normal limits.  SPLEEN: Within normal limits.  PANCREAS: Within normal limits.  ADRENALS: Within normal limits.  KIDNEYS/URETERS: Within normal limits.  BLADDER: Minimally distended. However anterior bladder wall is thickened and hypoechoic and focal abscess cannot be excluded.  REPRODUCTIVE ORGANS: Prostate within normal limits.  BOWEL: No bowel obstruction. Appendix is normal.. There is diverticulosis of the colon. Left lower quadrant colostomy is again seen. Suture line is seen in the proximal sigmoid colon. There is wall thickening in the sigmoid colon with stranding around the colon suggesting diverticulitis. 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  VESSELS: Mild atherosclerotic calcification of the aorta and its branches  RETROPERITONEUM/LYMPH NODES: Mild left para-aortic lymphadenopathy is again seen which may be reactive. Small lymph nodes are also seen in the mesentery at the level of the sigmoid diverticulitis.  ABDOMINAL WALL: There are bilateral fat-containing inguinal hernias right greater left. There is skin thickening and subcutaneous edema in the lower anterior abdominal wall also similar to the prior study  BONES: Degenerative changes.    IMPRESSION: Diverticulitis of the sigmoid colon is again seen status post left lower quadrant colostomy. 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 Cannot rule out abscess formation in the anterior bladder wall. Findings are without significant change from the recent prior study

## 2021-11-10 NOTE — PROGRESS NOTE ADULT - ASSESSMENT
57 y/o male w/ pmhx of DM, HTN, CHF, b/l LE swelling, Obesity (BMI 49), h/o diverticulitis/diverticular abscess s/p left colostomy 07/2021 presenting for re-evaluation of colostomy/requesting reversal.     # abd pain due to colostomy and hx of diverticulitis   - cont w/ zosyn Q 8 hrs   - plan for colostomy reversal with Dr. Jensen  - s/p colonoscopy today -   - s/p nuclear stress test ,ECHO results reviewed   - cardio cleared   - pulm - optimized no contraindications for procedure     # hx of DM 2   - cont w/ lantus 30 and ISS   - RD consulted     # chronic diastolic heart failure   - echo and stress test completed. patient optimized for surgery   - cardio Dr. Powers   -

## 2021-11-10 NOTE — PROGRESS NOTE ADULT - SUBJECTIVE AND OBJECTIVE BOX
SURGERY DAILY PROGRESS NOTE:     Subjective:  Patient seen and examined this AM at bedside. No acute events overnight and patient resting comfortably. Patient is taking bowel prep for colonoscopy today. Denies fever/chills, shortness of breath, chest pain. VS reviewed    Objective:    MEDICATIONS  (STANDING):  carvedilol 12.5 milliGRAM(s) Oral every 12 hours  dextrose 40% Gel 15 Gram(s) Oral once  dextrose 5%. 1000 milliLiter(s) (50 mL/Hr) IV Continuous <Continuous>  dextrose 5%. 1000 milliLiter(s) (100 mL/Hr) IV Continuous <Continuous>  dextrose 50% Injectable 25 Gram(s) IV Push once  dextrose 50% Injectable 12.5 Gram(s) IV Push once  dextrose 50% Injectable 25 Gram(s) IV Push once  enoxaparin Injectable 40 milliGRAM(s) SubCutaneous every 12 hours  furosemide    Tablet 20 milliGRAM(s) Oral daily  glucagon  Injectable 1 milliGRAM(s) IntraMuscular once  influenza   Vaccine 0.5 milliLiter(s) IntraMuscular once  insulin glargine Injectable (LANTUS) 35 Unit(s) SubCutaneous at bedtime  insulin lispro (ADMELOG) corrective regimen sliding scale   SubCutaneous three times a day before meals  lisinopril 20 milliGRAM(s) Oral daily  morphine  - Injectable 4 milliGRAM(s) IV Push once  nicotine -  14 mG/24Hr(s) Patch 1 patch Transdermal daily  pantoprazole  Injectable 40 milliGRAM(s) IV Push daily  piperacillin/tazobactam IVPB.. 3.375 Gram(s) IV Intermittent every 8 hours  tamsulosin 0.4 milliGRAM(s) Oral at bedtime    MEDICATIONS  (PRN):  acetaminophen     Tablet .. 650 milliGRAM(s) Oral every 6 hours PRN Mild Pain (1 - 3)  ALBUTerol    90 MICROgram(s) HFA Inhaler 2 Puff(s) Inhalation every 6 hours PRN Bronchospasm  ketorolac   Injectable 15 milliGRAM(s) IV Push once PRN Moderate Pain (4 - 6)  morphine  - Injectable 4 milliGRAM(s) IV Push every 6 hours PRN Severe Pain (7 - 10)  ondansetron Injectable 4 milliGRAM(s) IV Push every 6 hours PRN Nausea and/or Vomiting  traZODone 50 milliGRAM(s) Oral at bedtime PRN sleep/anxiety      Vital Signs Last 24 Hrs  T(C): 36.8 (10 Nov 2021 07:42), Max: 36.8 (2021 21:23)  T(F): 98.3 (10 Nov 2021 07:42), Max: 98.3 (10 Nov 2021 07:42)  HR: 68 (10 Nov 2021 07:42) (63 - 83)  BP: 156/83 (10 Nov 2021 07:42) (146/79 - 156/83)  BP(mean): --  RR: 18 (10 Nov 2021 07:42) (18 - 18)  SpO2: 94% (10 Nov 2021 07:42) (94% - 95%)      PHYSICAL EXAM   GENERAL: NAD, AOx3, well developed, morbidly obese  HEAD: Atraumatic, normocephalic  EYES: EOMI, PERRLA, conjunctiva and sclera clear  ENT: moist mucous membrane  NECK: supple, No JVD, midline trachea  CHEST/LUNG: unlabored respirations to room air  Heart: S1, S2 normal  ABDOMEN: soft, obese, non-tender, left side colostomy in place functioning  EXTREMITIES: b/l pitting edema  NERVOUS SYSTEM: AOx3, speech clear, no neuro-deficits  MSK: full ROM, no deformities    I&O's Detail      Daily     Daily     LABS:                        14.1   7.98  )-----------( 383      ( 10 Nov 2021 08:24 )             45.7         136  |  103  |  10  ----------------------------<  233<H>  4.3   |  30  |  0.88    Ca    8.9      2021 08:10  Phos  3.0     11-  Mg     2.3     -    TPro  6.9  /  Alb  2.7<L>  /  TBili  0.4  /  DBili  x   /  AST  26  /  ALT  33  /  AlkPhos  111  11-09    PT/INR - ( 2021 08:10 )   PT: 12.9 sec;   INR: 1.12 ratio         PTT - ( 2021 08:10 )  PTT:29.9 sec  Urinalysis Basic - ( 2021 09:47 )    Color: Yellow / Appearance: Clear / S.020 / pH: x  Gluc: x / Ketone: Trace  / Bili: Negative / Urobili: Negative mg/dL   Blood: x / Protein: 100 mg/dL / Nitrite: Negative   Leuk Esterase: Negative / RBC: 6-10 /HPF / WBC 0-2   Sq Epi: x / Non Sq Epi: Occasional / Bacteria: Occasional        RADIOLOGY & ADDITIONAL STUDIES:

## 2021-11-10 NOTE — PROGRESS NOTE ADULT - SUBJECTIVE AND OBJECTIVE BOX
PCP:    REQUESTING PHYSICIAN:    REASON FOR CONSULT:    CHIEF COMPLAINT:    HPI:  55 yo M with PMH of DM, HTN, CHF, b/l LE swelling, Obesity(BMI 49), h/o diverticulitis/diverticular abscess   s/p left colostomy 07/2021 at [facility] was seen in the ED for abdominal pain.   Pt reports he is suffering from this colostomy bag and wants it reversed.   Denies fever or chills, n/v/d/c. Pt reports the colostomy is functional.    PMH: as above  PSH: left colostomy 7/2021  Allergy: NKDA  SH: + smoking cig daily for last 20 yrs, denies etOH or illicit drug (08 Nov 2021 12:23)    Admitted for reversal of L sided colostomy.  Pulmonary medicine also consulted for clearance GI consulted for colonoscopy  to be done prior to surgery planned for wed.  Pt denies any cardiac symptoms: chest pain, dyspnea, palpitaitons, syncope.  somewhat poor historian - reports h/o stress test in past year.  Reports an evaluation Dr. TATY Peace at [facility] who ordered this.  Called his office & they state stress test Sep '20, echo & LM Mar '21   was completed, will fax me this.  Also reviewed allscripts & I see he was evaluated by Dr. Bhanu marin/ arian & stress & echo were ordered but he cancelled these tests.  Dr. Drummond was not aware of tests I mentioned above.    11/9/'21: no complaints overnight.  PMH: as above  PSH: left colostomy 7/2021  Allergy: NKDA  SH: + smoking cig daily for last 20 yrs, denies etOH or illicit drug (08 Nov 2021 12:23)      PAST MEDICAL & SURGICAL HISTORY:  HTN (hypertension)    Diabetes    Diverticulitis    S/P colostomy  left    Obese    S/P colostomy  left 7/2021        SOCIAL HISTORY:    FAMILY HISTORY:  FH: HTN (hypertension) (Father)        ALLERGIES:  Allergies    No Known Allergies    Intolerances        MEDICATIONS:    MEDICATIONS  (STANDING):  carvedilol 12.5 milliGRAM(s) Oral every 12 hours  dextrose 40% Gel 15 Gram(s) Oral once  dextrose 5%. 1000 milliLiter(s) (50 mL/Hr) IV Continuous <Continuous>  dextrose 5%. 1000 milliLiter(s) (100 mL/Hr) IV Continuous <Continuous>  dextrose 50% Injectable 25 Gram(s) IV Push once  dextrose 50% Injectable 12.5 Gram(s) IV Push once  dextrose 50% Injectable 25 Gram(s) IV Push once  enoxaparin Injectable 40 milliGRAM(s) SubCutaneous every 12 hours  furosemide    Tablet 20 milliGRAM(s) Oral daily  glucagon  Injectable 1 milliGRAM(s) IntraMuscular once  influenza   Vaccine 0.5 milliLiter(s) IntraMuscular once  insulin glargine Injectable (LANTUS) 35 Unit(s) SubCutaneous at bedtime  insulin lispro (ADMELOG) corrective regimen sliding scale   SubCutaneous three times a day before meals  lisinopril 20 milliGRAM(s) Oral daily  metroNIDAZOLE    Tablet 500 milliGRAM(s) Oral <User Schedule>  morphine  - Injectable 4 milliGRAM(s) IV Push once  neomycin 1000 milliGRAM(s) Oral <User Schedule>  nicotine -  14 mG/24Hr(s) Patch 1 patch Transdermal daily  pantoprazole  Injectable 40 milliGRAM(s) IV Push daily  piperacillin/tazobactam IVPB.. 3.375 Gram(s) IV Intermittent every 8 hours  tamsulosin 0.4 milliGRAM(s) Oral at bedtime    MEDICATIONS  (PRN):  acetaminophen     Tablet .. 650 milliGRAM(s) Oral every 6 hours PRN Mild Pain (1 - 3)  ALBUTerol    90 MICROgram(s) HFA Inhaler 2 Puff(s) Inhalation every 6 hours PRN Bronchospasm  ketorolac   Injectable 15 milliGRAM(s) IV Push once PRN Moderate Pain (4 - 6)  morphine  - Injectable 4 milliGRAM(s) IV Push every 6 hours PRN Severe Pain (7 - 10)  ondansetron Injectable 4 milliGRAM(s) IV Push every 6 hours PRN Nausea and/or Vomiting  traZODone 50 milliGRAM(s) Oral at bedtime PRN sleep/anxiety      Vital Signs Last 24 Hrs  T(C): 36.8 (10 Nov 2021 07:42), Max: 36.8 (09 Nov 2021 21:23)  T(F): 98.3 (10 Nov 2021 07:42), Max: 98.3 (10 Nov 2021 07:42)  HR: 68 (10 Nov 2021 07:42) (63 - 83)  BP: 156/83 (10 Nov 2021 07:42) (146/79 - 156/83)  BP(mean): --  RR: 18 (10 Nov 2021 07:42) (18 - 18)  SpO2: 94% (10 Nov 2021 07:42) (94% - 95%)Daily     Daily I&O's Summary      PHYSICAL EXAM:    Constitutional: NAD, awake and alert, well-developed  HEENT: PERR, EOMI,  No oral cyananosis.  Neck:  supple,  No JVD  Respiratory: Breath sounds are clear bilaterally, No wheezing, rales or rhonchi  Cardiovascular: S1 and S2, regular rate and rhythm, no Murmurs, gallops or rubs  Gastrointestinal: colostomy  Extremities: No peripheral edema. No clubbing or cyanosis.  Vascular: 2+ peripheral pulses  Neurological: A/O x 3, no focal deficits  Musculoskeletal: no calf tenderness.  Skin: No rashes.      LABS: All Labs Reviewed:                        14.1   7.98  )-----------( 383      ( 10 Nov 2021 08:24 )             45.7                         13.5   10.77 )-----------( 337      ( 09 Nov 2021 08:10 )             42.3                         13.0   10.78 )-----------( 311      ( 08 Nov 2021 09:47 )             41.6     10 Nov 2021 08:24    139    |  102    |  8      ----------------------------<  154    3.9     |  33     |  1.09   09 Nov 2021 08:10    136    |  103    |  10     ----------------------------<  233    4.3     |  30     |  0.88   08 Nov 2021 09:47    136    |  100    |  13     ----------------------------<  336    4.4     |  31     |  1.10     Ca    9.1        10 Nov 2021 08:24  Ca    8.9        09 Nov 2021 08:10  Ca    8.7        08 Nov 2021 09:47  Phos  3.7       10 Nov 2021 08:24  Phos  3.0       09 Nov 2021 08:10  Mg     2.2       10 Nov 2021 08:24  Mg     2.3       09 Nov 2021 08:10    TPro  7.5    /  Alb  2.9    /  TBili  0.5    /  DBili  x      /  AST  26     /  ALT  34     /  AlkPhos  116    10 Nov 2021 08:24  TPro  6.9    /  Alb  2.7    /  TBili  0.4    /  DBili  x      /  AST  26     /  ALT  33     /  AlkPhos  111    09 Nov 2021 08:10  TPro  7.2    /  Alb  2.8    /  TBili  0.4    /  DBili  x      /  AST  22     /  ALT  34     /  AlkPhos  126    08 Nov 2021 09:47    PT/INR - ( 10 Nov 2021 08:24 )   PT: 12.5 sec;   INR: 1.07 ratio         PTT - ( 09 Nov 2021 08:10 )  PTT:29.9 sec      Blood Culture: Organism --  Gram Stain Blood -- Gram Stain --  Specimen Source .Blood None  Culture-Blood --    Organism --  Gram Stain Blood -- Gram Stain --  Specimen Source Clean Catch Clean Catch (Midstream)  Culture-Blood --            RADIOLOGY/EKG:      ECHO/CARDIAC CATHTERIZATION/STRESS TEST:< from: TTE Echo Complete w/o Contrast w/ Doppler (11.09.21 @ 13:54) >   Impression     Summary     Mild concentric left ventricular hypertrophy is present.   Estimated left ventricular ejection fraction is 55-60 %.   Fibrocalcific changes noted to the Aortic valve leaflets with preserved   leaflet excursion.   The ascending aorta appears to be at the upper limits of normal in size.   Moderate mitral annular calcification is present.   Fibrocalcific changes noted to the mitral valve leaflets with preserved   leaflet excursion.   The tricuspid valve leaflets are thin and pliable; valve motion is normal.   Trace tricuspid valve regurgitation is present.     Signature     ----------------------------------------------------------------   Electronically signed by Broderick Gotti MD(Interpreting   physician) on 11/09/2021 10:08 PM   ----------------------------------------------------------------    < end of copied text >    < from: NM Nuclear Stress Pharmacologic Multiple (11.10.21 @ 09:25) >  IMPRESSION: SPECT Myocardial PerfusionImaging demonstrates:    Mildly enlarged/dilated left ventricle.    No scan evidence of reversible or fixed perfusion defects.    Normal left ventricular contractility with an ejection fraction of 56% (Normal: 50% or greater).    No regional wall motion abnormalities.    Please refer to cardiac stress test report for dosage of Regadenoson administered, EKG findings and symptoms during the procedure.    --- End of Report ---    < end of copied text >

## 2021-11-10 NOTE — PROGRESS NOTE ADULT - ASSESSMENT
55yo M with multiple comorbidities, including diverticulitis s/p left side colostomy here for colostomy reversal  s/p stress test yesterday. Pending cardiology eval/recc    Plan:  pain control prn  monitor VS   NPO, bowel prep  GI recs appreciated, colonoscopy today  endocrine consult for uncontrolled DM, Last A1C 11.4. Glc better controlled  cardiology, medicine consult for surgical clearances  appreciate pulmonology clearance  start home meds  GI/DVT prop    Plan discussed with surgery team and colorectal attending, Dr. Lagos

## 2021-11-11 ENCOUNTER — APPOINTMENT (OUTPATIENT)
Dept: COLORECTAL SURGERY | Facility: HOSPITAL | Age: 56
End: 2021-11-11
Payer: MEDICAID

## 2021-11-11 ENCOUNTER — RESULT REVIEW (OUTPATIENT)
Age: 56
End: 2021-11-11

## 2021-11-11 LAB
ALBUMIN SERPL ELPH-MCNC: 2.9 G/DL — LOW (ref 3.3–5)
ALP SERPL-CCNC: 106 U/L — SIGNIFICANT CHANGE UP (ref 40–120)
ALT FLD-CCNC: 33 U/L — SIGNIFICANT CHANGE UP (ref 12–78)
ANION GAP SERPL CALC-SCNC: 5 MMOL/L — SIGNIFICANT CHANGE UP (ref 5–17)
APTT BLD: 32.8 SEC — SIGNIFICANT CHANGE UP (ref 27.5–35.5)
AST SERPL-CCNC: 27 U/L — SIGNIFICANT CHANGE UP (ref 15–37)
BASOPHILS # BLD AUTO: 0.04 K/UL — SIGNIFICANT CHANGE UP (ref 0–0.2)
BASOPHILS NFR BLD AUTO: 0.5 % — SIGNIFICANT CHANGE UP (ref 0–2)
BILIRUB SERPL-MCNC: 0.5 MG/DL — SIGNIFICANT CHANGE UP (ref 0.2–1.2)
BUN SERPL-MCNC: 9 MG/DL — SIGNIFICANT CHANGE UP (ref 7–23)
CALCIUM SERPL-MCNC: 8.9 MG/DL — SIGNIFICANT CHANGE UP (ref 8.5–10.1)
CHLORIDE SERPL-SCNC: 103 MMOL/L — SIGNIFICANT CHANGE UP (ref 96–108)
CO2 SERPL-SCNC: 29 MMOL/L — SIGNIFICANT CHANGE UP (ref 22–31)
CREAT SERPL-MCNC: 1.14 MG/DL — SIGNIFICANT CHANGE UP (ref 0.5–1.3)
EOSINOPHIL # BLD AUTO: 0.16 K/UL — SIGNIFICANT CHANGE UP (ref 0–0.5)
EOSINOPHIL NFR BLD AUTO: 1.9 % — SIGNIFICANT CHANGE UP (ref 0–6)
GLUCOSE SERPL-MCNC: 149 MG/DL — HIGH (ref 70–99)
HCT VFR BLD CALC: 43.2 % — SIGNIFICANT CHANGE UP (ref 39–50)
HGB BLD-MCNC: 13.5 G/DL — SIGNIFICANT CHANGE UP (ref 13–17)
IMM GRANULOCYTES NFR BLD AUTO: 0.7 % — SIGNIFICANT CHANGE UP (ref 0–1.5)
INR BLD: 1.14 RATIO — SIGNIFICANT CHANGE UP (ref 0.88–1.16)
LYMPHOCYTES # BLD AUTO: 0.96 K/UL — LOW (ref 1–3.3)
LYMPHOCYTES # BLD AUTO: 11.5 % — LOW (ref 13–44)
MAGNESIUM SERPL-MCNC: 2.1 MG/DL — SIGNIFICANT CHANGE UP (ref 1.6–2.6)
MCHC RBC-ENTMCNC: 27 PG — SIGNIFICANT CHANGE UP (ref 27–34)
MCHC RBC-ENTMCNC: 31.3 GM/DL — LOW (ref 32–36)
MCV RBC AUTO: 86.4 FL — SIGNIFICANT CHANGE UP (ref 80–100)
MONOCYTES # BLD AUTO: 0.48 K/UL — SIGNIFICANT CHANGE UP (ref 0–0.9)
MONOCYTES NFR BLD AUTO: 5.7 % — SIGNIFICANT CHANGE UP (ref 2–14)
NEUTROPHILS # BLD AUTO: 6.67 K/UL — SIGNIFICANT CHANGE UP (ref 1.8–7.4)
NEUTROPHILS NFR BLD AUTO: 79.7 % — HIGH (ref 43–77)
PHOSPHATE SERPL-MCNC: 3.7 MG/DL — SIGNIFICANT CHANGE UP (ref 2.5–4.5)
PLATELET # BLD AUTO: 389 K/UL — SIGNIFICANT CHANGE UP (ref 150–400)
POTASSIUM SERPL-MCNC: 4 MMOL/L — SIGNIFICANT CHANGE UP (ref 3.5–5.3)
POTASSIUM SERPL-SCNC: 4 MMOL/L — SIGNIFICANT CHANGE UP (ref 3.5–5.3)
PROT SERPL-MCNC: 7.4 GM/DL — SIGNIFICANT CHANGE UP (ref 6–8.3)
PROTHROM AB SERPL-ACNC: 13.1 SEC — SIGNIFICANT CHANGE UP (ref 10.6–13.6)
RBC # BLD: 5 M/UL — SIGNIFICANT CHANGE UP (ref 4.2–5.8)
RBC # FLD: 14.4 % — SIGNIFICANT CHANGE UP (ref 10.3–14.5)
SODIUM SERPL-SCNC: 137 MMOL/L — SIGNIFICANT CHANGE UP (ref 135–145)
WBC # BLD: 8.37 K/UL — SIGNIFICANT CHANGE UP (ref 3.8–10.5)
WBC # FLD AUTO: 8.37 K/UL — SIGNIFICANT CHANGE UP (ref 3.8–10.5)

## 2021-11-11 PROCEDURE — 44145 PARTIAL REMOVAL OF COLON: CPT | Mod: 59

## 2021-11-11 PROCEDURE — 71045 X-RAY EXAM CHEST 1 VIEW: CPT | Mod: 26

## 2021-11-11 PROCEDURE — 99232 SBSQ HOSP IP/OBS MODERATE 35: CPT

## 2021-11-11 PROCEDURE — 52332 CYSTOSCOPY AND TREATMENT: CPT | Mod: 50

## 2021-11-11 PROCEDURE — 44139 MOBILIZATION OF COLON: CPT

## 2021-11-11 PROCEDURE — 88304 TISSUE EXAM BY PATHOLOGIST: CPT | Mod: 26

## 2021-11-11 PROCEDURE — 88305 TISSUE EXAM BY PATHOLOGIST: CPT | Mod: 26

## 2021-11-11 PROCEDURE — 44661 REPAIR BOWEL-BLADDER FISTULA: CPT

## 2021-11-11 PROCEDURE — 44310 ILEOSTOMY/JEJUNOSTOMY: CPT

## 2021-11-11 PROCEDURE — 88307 TISSUE EXAM BY PATHOLOGIST: CPT | Mod: 26

## 2021-11-11 RX ORDER — HYDROMORPHONE HYDROCHLORIDE 2 MG/ML
0.5 INJECTION INTRAMUSCULAR; INTRAVENOUS; SUBCUTANEOUS
Refills: 0 | Status: DISCONTINUED | OUTPATIENT
Start: 2021-11-11 | End: 2021-11-13

## 2021-11-11 RX ORDER — HYDROMORPHONE HYDROCHLORIDE 2 MG/ML
30 INJECTION INTRAMUSCULAR; INTRAVENOUS; SUBCUTANEOUS
Refills: 0 | Status: DISCONTINUED | OUTPATIENT
Start: 2021-11-11 | End: 2021-11-13

## 2021-11-11 RX ORDER — SODIUM CHLORIDE 9 MG/ML
1000 INJECTION, SOLUTION INTRAVENOUS
Refills: 0 | Status: DISCONTINUED | OUTPATIENT
Start: 2021-11-11 | End: 2021-11-13

## 2021-11-11 RX ORDER — KETOROLAC TROMETHAMINE 30 MG/ML
15 SYRINGE (ML) INJECTION EVERY 6 HOURS
Refills: 0 | Status: DISCONTINUED | OUTPATIENT
Start: 2021-11-11 | End: 2021-11-12

## 2021-11-11 RX ORDER — OXYCODONE HYDROCHLORIDE 5 MG/1
5 TABLET ORAL EVERY 4 HOURS
Refills: 0 | Status: DISCONTINUED | OUTPATIENT
Start: 2021-11-11 | End: 2021-11-11

## 2021-11-11 RX ORDER — ACETAMINOPHEN 500 MG
1000 TABLET ORAL ONCE
Refills: 0 | Status: COMPLETED | OUTPATIENT
Start: 2021-11-11 | End: 2021-11-11

## 2021-11-11 RX ORDER — NALOXONE HYDROCHLORIDE 4 MG/.1ML
0.1 SPRAY NASAL
Refills: 0 | Status: ACTIVE | OUTPATIENT
Start: 2021-11-11 | End: 2022-10-10

## 2021-11-11 RX ORDER — FENTANYL CITRATE 50 UG/ML
50 INJECTION INTRAVENOUS
Refills: 0 | Status: DISCONTINUED | OUTPATIENT
Start: 2021-11-11 | End: 2021-11-11

## 2021-11-11 RX ORDER — ONDANSETRON 8 MG/1
4 TABLET, FILM COATED ORAL EVERY 6 HOURS
Refills: 0 | Status: ACTIVE | OUTPATIENT
Start: 2021-11-11 | End: 2022-10-10

## 2021-11-11 RX ORDER — OXYCODONE HYDROCHLORIDE 5 MG/1
5 TABLET ORAL ONCE
Refills: 0 | Status: DISCONTINUED | OUTPATIENT
Start: 2021-11-11 | End: 2021-11-11

## 2021-11-11 RX ORDER — ONDANSETRON 8 MG/1
4 TABLET, FILM COATED ORAL ONCE
Refills: 0 | Status: DISCONTINUED | OUTPATIENT
Start: 2021-11-11 | End: 2021-11-11

## 2021-11-11 RX ORDER — SODIUM CHLORIDE 9 MG/ML
1000 INJECTION, SOLUTION INTRAVENOUS
Refills: 0 | Status: DISCONTINUED | OUTPATIENT
Start: 2021-11-11 | End: 2021-11-11

## 2021-11-11 RX ORDER — HYDROMORPHONE HYDROCHLORIDE 2 MG/ML
1 INJECTION INTRAMUSCULAR; INTRAVENOUS; SUBCUTANEOUS
Refills: 0 | Status: DISCONTINUED | OUTPATIENT
Start: 2021-11-11 | End: 2021-11-11

## 2021-11-11 RX ORDER — ACETAMINOPHEN 500 MG
1000 TABLET ORAL ONCE
Refills: 0 | Status: COMPLETED | OUTPATIENT
Start: 2021-11-12 | End: 2021-11-12

## 2021-11-11 RX ADMIN — Medication 15 MILLIGRAM(S): at 21:25

## 2021-11-11 RX ADMIN — Medication 400 MILLIGRAM(S): at 20:13

## 2021-11-11 RX ADMIN — CARVEDILOL PHOSPHATE 12.5 MILLIGRAM(S): 80 CAPSULE, EXTENDED RELEASE ORAL at 21:24

## 2021-11-11 RX ADMIN — Medication 2: at 18:34

## 2021-11-11 RX ADMIN — CARVEDILOL PHOSPHATE 12.5 MILLIGRAM(S): 80 CAPSULE, EXTENDED RELEASE ORAL at 09:29

## 2021-11-11 RX ADMIN — Medication 20 MILLIGRAM(S): at 09:29

## 2021-11-11 RX ADMIN — TAMSULOSIN HYDROCHLORIDE 0.4 MILLIGRAM(S): 0.4 CAPSULE ORAL at 21:25

## 2021-11-11 RX ADMIN — PIPERACILLIN AND TAZOBACTAM 25 GRAM(S): 4; .5 INJECTION, POWDER, LYOPHILIZED, FOR SOLUTION INTRAVENOUS at 05:50

## 2021-11-11 RX ADMIN — INSULIN GLARGINE 35 UNIT(S): 100 INJECTION, SOLUTION SUBCUTANEOUS at 22:28

## 2021-11-11 RX ADMIN — PIPERACILLIN AND TAZOBACTAM 25 GRAM(S): 4; .5 INJECTION, POWDER, LYOPHILIZED, FOR SOLUTION INTRAVENOUS at 22:29

## 2021-11-11 RX ADMIN — SODIUM CHLORIDE 150 MILLILITER(S): 9 INJECTION, SOLUTION INTRAVENOUS at 16:51

## 2021-11-11 RX ADMIN — Medication 15 MILLIGRAM(S): at 21:45

## 2021-11-11 RX ADMIN — HYDROMORPHONE HYDROCHLORIDE 30 MILLILITER(S): 2 INJECTION INTRAMUSCULAR; INTRAVENOUS; SUBCUTANEOUS at 16:49

## 2021-11-11 RX ADMIN — PANTOPRAZOLE SODIUM 40 MILLIGRAM(S): 20 TABLET, DELAYED RELEASE ORAL at 09:29

## 2021-11-11 RX ADMIN — ENOXAPARIN SODIUM 40 MILLIGRAM(S): 100 INJECTION SUBCUTANEOUS at 21:25

## 2021-11-11 RX ADMIN — LISINOPRIL 20 MILLIGRAM(S): 2.5 TABLET ORAL at 09:29

## 2021-11-11 NOTE — BRIEF OPERATIVE NOTE - NSICDXBRIEFPROCEDURE_GEN_ALL_CORE_FT
PROCEDURES:  Cystoscopy with insertion of ureteral stent in adult 11-Nov-2021 14:47:35 bilateral, external Eyal Martins

## 2021-11-11 NOTE — PROGRESS NOTE ADULT - TIME BILLING
56yoM with sigmoid diverticulitis with apparent colovesical fistula and possible abscess, multiple comorbidities including poorly controlled diabetes (Hgb A1C >11), morbid obesity, HTN, CHF, history of smoking.  He has been evaluated by Pulmonology (cleared for surgery), cardiac clearance, colonoscopy pending for today.  Will review results, tentatively plan for surgery tomorrow.    On exam his abdomen is obese, soft, nondistended, nontender.  Colostomy in LLQ, pink and perfused, functioning well.    I spent several minutes discussing with the patient and his sister (via phone) that surgery would most likely entail colon resection and diverting loop ileostomy.  I explained that ileostomy is needed due to multiple factors, including his many comorbidities (particularly his poorly controlled diabetes and obesity) as well as the likely inflammation/scarring that will be found during his procedure.  While he and his sister were frustrated with this, they understand that this will likely be a necessary step to protect any anastomosis and allow it the best possible chance of healing.    Will have ostomy nurse yumiko patient for ileostomy.  Will follow up final cardiology recommendations and colonoscopy results.
Seen and examined.  Seen by pulmonary and optimized.  Cardiology to perform echo and nuclear stress test today  Colonoscopy planned for tomorrow.  HgbA1c 11.4.  No acute overnight events  AFVSS  NAD  colostomy healthy, soft, NT, distended  Labs reviewed  CT imaging and report reviewed  A/P - s/p diverting colostomy with diverticulitis and radiographic findings of possible colovesical fistula  Diabetic specialist consulted for A1C.  Await results of cards workup today and colonoscopy tomorrow.  Surgical planning in progress.
I personally saw and examined the patient and reviewed his chart, labs, and studies.  I have reviewed the resident note and have made revisions where necessary.      56yoM with diverticulitis, surgical history essentially entailing creation of diverting loop colostomy without resection of sigmoid colon, multiple comorbidities including poorly controlled diabetes, CHF.  Colonoscopy yesterday with inflammation of colon noted at site of likely diverticulitis but no mass lesions.  Patient has been marked for ileostomy.  Medical clearance from cardiology and pulmonology obtained.  Plan for OR later today.

## 2021-11-11 NOTE — CONSULT NOTE ADULT - SUBJECTIVE AND OBJECTIVE BOX
ICU Consult Note    HPI:    S:    Pt seen and examined  HD # 4  FULL CODE  PMHx DM, HTN, CHF, b/l LE swelling, Obesity(BMI 49), h/o diverticulitis/diverticular abscess s/p left colostomy 07/2021 at [facility]   Pt here for abdominal pain. Pt reports he is suffering from this colostomy bag and wants it reversed. Denies fever or chills, n/v/d/c. Pt reports the colostomy is functional    11/11--> s/p ex-lap, open resection, colostomy takedown/reversal, reanastomosis, creation of diverting loop ileostomy, repair of colovesicular fistula, evacuation of pelvic abscess    ICU consult for intra and post op hypoxia    11/11 PM: Pt in PACU, extubated, awake. On 40% VM, SpO2 low to mid 90's.     ROS: + post op pain  Remainder of systems reviewed, negative    Allergies    No Known Allergies    Intolerances    MEDICATIONS  (STANDING):    acetaminophen   IVPB .. 1000 milliGRAM(s) IV Intermittent once  carvedilol 12.5 milliGRAM(s) Oral every 12 hours  dextrose 40% Gel 15 Gram(s) Oral once  dextrose 5%. 1000 milliLiter(s) (50 mL/Hr) IV Continuous <Continuous>  dextrose 5%. 1000 milliLiter(s) (100 mL/Hr) IV Continuous <Continuous>  dextrose 50% Injectable 25 Gram(s) IV Push once  dextrose 50% Injectable 12.5 Gram(s) IV Push once  dextrose 50% Injectable 25 Gram(s) IV Push once  enoxaparin Injectable 40 milliGRAM(s) SubCutaneous every 12 hours  furosemide    Tablet 20 milliGRAM(s) Oral daily  glucagon  Injectable 1 milliGRAM(s) IntraMuscular once  HYDROmorphone PCA (1 mG/mL) 30 milliLiter(s) PCA Continuous PCA Continuous  influenza   Vaccine 0.5 milliLiter(s) IntraMuscular once  insulin glargine Injectable (LANTUS) 35 Unit(s) SubCutaneous at bedtime  insulin lispro (ADMELOG) corrective regimen sliding scale   SubCutaneous three times a day before meals  ketorolac   Injectable 15 milliGRAM(s) IV Push every 6 hours  lactated ringers. 1000 milliLiter(s) (125 mL/Hr) IV Continuous <Continuous>  lisinopril 20 milliGRAM(s) Oral daily  nicotine -  14 mG/24Hr(s) Patch 1 patch Transdermal daily  pantoprazole  Injectable 40 milliGRAM(s) IV Push daily  piperacillin/tazobactam IVPB.. 3.375 Gram(s) IV Intermittent every 8 hours  tamsulosin 0.4 milliGRAM(s) Oral at bedtime    MEDICATIONS  (PRN):    acetaminophen     Tablet .. 650 milliGRAM(s) Oral every 6 hours PRN Mild Pain (1 - 3)  ALBUTerol    90 MICROgram(s) HFA Inhaler 2 Puff(s) Inhalation every 6 hours PRN Bronchospasm  fentaNYL    Injectable 50 MICROGram(s) IV Push every 10 minutes PRN Severe Pain (7 - 10)  HYDROmorphone PCA (1 mG/mL) Rescue Clinician Bolus 0.5 milliGRAM(s) IV Push every 15 minutes PRN for Pain Scale GREATER THAN 6  naloxone Injectable 0.1 milliGRAM(s) IV Push every 3 minutes PRN For ANY of the following changes in patient status:  A. RR LESS THAN 10 breaths per minute, B. Oxygen saturation LESS THAN 90%, C. Sedation score of 6  ondansetron Injectable 4 milliGRAM(s) IV Push every 6 hours PRN Nausea and/or Vomiting  ondansetron Injectable 4 milliGRAM(s) IV Push every 6 hours PRN Nausea  ondansetron Injectable 4 milliGRAM(s) IV Push once PRN Nausea and/or Vomiting  traZODone 50 milliGRAM(s) Oral at bedtime PRN sleep/anxiety      Drug Dosing Weight:    Height (cm): 175.3 (08 Nov 2021 08:36)  Weight (kg): 149.7 (08 Nov 2021 08:36)  BMI (kg/m2): 48.7 (08 Nov 2021 08:36)  BSA (m2): 2.56 (08 Nov 2021 08:36)    PAST MEDICAL & SURGICAL HISTORY:    HTN (hypertension)    Diabetes    Diverticulitis    S/P colostomy  left    Obese    S/P colostomy  left 7/2021    FAMILY HISTORY:    FH: HTN (hypertension) (Father)    ROS: See HPI; otherwise, all systems reviewed and negative.    O:    ICU Vital Signs Last 24 Hrs  T(C): 36.7 (11 Nov 2021 16:31), Max: 36.8 (11 Nov 2021 09:21)  T(F): 98.1 (11 Nov 2021 16:31), Max: 98.2 (11 Nov 2021 09:21)  HR: 68 (11 Nov 2021 17:45) (61 - 81)  BP: 145/82 (11 Nov 2021 17:45) (121/70 - 163/91)  BP(mean): --  ABP: --  ABP(mean): --  RR: 12 (11 Nov 2021 17:45) (12 - 25)  SpO2: 99% (11 Nov 2021 17:45) (90% - 99%)    I&O's Detail    10 Nov 2021 07:01  -  11 Nov 2021 07:00  --------------------------------------------------------  IN:    Oral Fluid: 1000 mL  Total IN: 1000 mL    OUT:  Total OUT: 0 mL    Total NET: 1000 mL      11 Nov 2021 07:01  -  11 Nov 2021 18:14  --------------------------------------------------------  IN:    Lactated Ringers: 200 mL    Other (mL): 1500 mL  Total IN: 1700 mL    OUT:    Indwelling Catheter - Urethral (mL): 100 mL    Other (mL): 400 mL  Total OUT: 500 mL    Total NET: 1200 mL    PE:    Adult M lying in bed  No JVD trachea midline  S1S2+  Diminished BS bibasilar posterior, anterior OK  Abd---> obese, ileostomy pink, sweat in bag, s/p reversal of colostomy, NT throughout  0-1 edema B/L LE, chronic skin changes B/L  Awake and alert      LABS:    CBC Full  -  ( 11 Nov 2021 04:45 )  WBC Count : 8.37 K/uL  RBC Count : 5.00 M/uL  Hemoglobin : 13.5 g/dL  Hematocrit : 43.2 %  Platelet Count - Automated : 389 K/uL  Mean Cell Volume : 86.4 fl  Mean Cell Hemoglobin : 27.0 pg  Mean Cell Hemoglobin Concentration : 31.3 gm/dL  Auto Neutrophil # : 6.67 K/uL  Auto Lymphocyte # : 0.96 K/uL  Auto Monocyte # : 0.48 K/uL  Auto Eosinophil # : 0.16 K/uL  Auto Basophil # : 0.04 K/uL  Auto Neutrophil % : 79.7 %  Auto Lymphocyte % : 11.5 %  Auto Monocyte % : 5.7 %  Auto Eosinophil % : 1.9 %  Auto Basophil % : 0.5 %    11-11    137  |  103  |  9   ----------------------------<  149<H>  4.0   |  29  |  1.14    Ca    8.9      11 Nov 2021 04:45  Phos  3.7     11-11  Mg     2.1     11-11    TPro  7.4  /  Alb  2.9<L>  /  TBili  0.5  /  DBili  x   /  AST  27  /  ALT  33  /  AlkPhos  106  11-11    PT/INR - ( 11 Nov 2021 04:45 )   PT: 13.1 sec;   INR: 1.14 ratio         PTT - ( 11 Nov 2021 04:45 )  PTT:32.8 sec    CAPILLARY BLOOD GLUCOSE      POCT Blood Glucose.: 168 mg/dL (11 Nov 2021 16:37)  POCT Blood Glucose.: 124 mg/dL (11 Nov 2021 11:40)  POCT Blood Glucose.: 124 mg/dL (11 Nov 2021 05:54)  POCT Blood Glucose.: 131 mg/dL (10 Nov 2021 21:39)        LIVER FUNCTIONS - ( 11 Nov 2021 04:45 )  Alb: 2.9 g/dL / Pro: 7.4 gm/dL / ALK PHOS: 106 U/L / ALT: 33 U/L / AST: 27 U/L / GGT: x

## 2021-11-11 NOTE — PROGRESS NOTE ADULT - SUBJECTIVE AND OBJECTIVE BOX
SURGERY DAILY PROGRESS NOTE:     Subjective:  Patient seen and examined this AM at bedside. No acute events overnight and patient resting comfortably. Patient is taking bowel prep for colonoscopy today. Denies fever/chills, shortness of breath, chest pain. VS reviewed    Objective:    MEDICATIONS  (STANDING):  carvedilol 12.5 milliGRAM(s) Oral every 12 hours  dextrose 40% Gel 15 Gram(s) Oral once  dextrose 5%. 1000 milliLiter(s) (50 mL/Hr) IV Continuous <Continuous>  dextrose 5%. 1000 milliLiter(s) (100 mL/Hr) IV Continuous <Continuous>  dextrose 50% Injectable 25 Gram(s) IV Push once  dextrose 50% Injectable 12.5 Gram(s) IV Push once  dextrose 50% Injectable 25 Gram(s) IV Push once  enoxaparin Injectable 40 milliGRAM(s) SubCutaneous every 12 hours  furosemide    Tablet 20 milliGRAM(s) Oral daily  glucagon  Injectable 1 milliGRAM(s) IntraMuscular once  influenza   Vaccine 0.5 milliLiter(s) IntraMuscular once  insulin glargine Injectable (LANTUS) 35 Unit(s) SubCutaneous at bedtime  insulin lispro (ADMELOG) corrective regimen sliding scale   SubCutaneous three times a day before meals  lisinopril 20 milliGRAM(s) Oral daily  morphine  - Injectable 4 milliGRAM(s) IV Push once  nicotine -  14 mG/24Hr(s) Patch 1 patch Transdermal daily  pantoprazole  Injectable 40 milliGRAM(s) IV Push daily  piperacillin/tazobactam IVPB.. 3.375 Gram(s) IV Intermittent every 8 hours  polyethylene glycol/electrolyte Solution      polyethylene glycol/electrolyte Solution 1000 milliLiter(s) Oral at bedtime  tamsulosin 0.4 milliGRAM(s) Oral at bedtime    MEDICATIONS  (PRN):  acetaminophen     Tablet .. 650 milliGRAM(s) Oral every 6 hours PRN Mild Pain (1 - 3)  ALBUTerol    90 MICROgram(s) HFA Inhaler 2 Puff(s) Inhalation every 6 hours PRN Bronchospasm  ketorolac   Injectable 15 milliGRAM(s) IV Push once PRN Moderate Pain (4 - 6)  morphine  - Injectable 4 milliGRAM(s) IV Push every 6 hours PRN Severe Pain (7 - 10)  ondansetron Injectable 4 milliGRAM(s) IV Push every 6 hours PRN Nausea and/or Vomiting  traZODone 50 milliGRAM(s) Oral at bedtime PRN sleep/anxiety    Vital Signs Last 24 Hrs  T(C): 36.4 (10 Nov 2021 23:46), Max: 36.6 (10 Nov 2021 15:07)  T(F): 97.5 (10 Nov 2021 23:46), Max: 97.9 (10 Nov 2021 15:07)  HR: 61 (10 Nov 2021 23:46) (61 - 81)  BP: 121/70 (10 Nov 2021 23:46) (121/70 - 143/74)  RR: 18 (10 Nov 2021 23:46) (18 - 18)  SpO2: 94% (10 Nov 2021 23:46) (92% - 94%)      PHYSICAL EXAM   GENERAL: NAD, AOx3, well developed, morbidly obese  HEAD: Atraumatic, normocephalic  EYES: EOMI, PERRLA, conjunctiva and sclera clear  ENT: moist mucous membrane  NECK: supple, No JVD, midline trachea  CHEST/LUNG: unlabored respirations to room air  Heart: S1, S2 normal  ABDOMEN: soft, obese, non-tender, left side colostomy in place functioning  EXTREMITIES: b/l pitting edema  NERVOUS SYSTEM: AOx3, speech clear, no neuro-deficits  MSK: full ROM, no deformities    I&O's Detail    10 Nov 2021 07:01  -  11 Nov 2021 07:00  --------------------------------------------------------  IN:    Oral Fluid: 1000 mL  Total IN: 1000 mL    OUT:  Total OUT: 0 mL    Total NET: 1000 mL      LABS:                        13.5   8.37  )-----------( 389      ( 11 Nov 2021 04:45 )             43.2     11 Nov 2021 04:45    137    |  103    |  9      ----------------------------<  149    4.0     |  29     |  1.14     Ca    8.9        11 Nov 2021 04:45  Phos  3.7       11 Nov 2021 04:45  Mg     2.1       11 Nov 2021 04:45    TPro  7.4    /  Alb  2.9    /  TBili  0.5    /  DBili  x      /  AST  27     /  ALT  33     /  AlkPhos  106    11 Nov 2021 04:45    PT/INR - ( 11 Nov 2021 04:45 )   PT: 13.1 sec;   INR: 1.14 ratio         PTT - ( 11 Nov 2021 04:45 )  PTT:32.8 sec SURGERY DAILY PROGRESS NOTE:     Subjective:  Patient seen and examined this AM at bedside. No acute events overnight and patient resting comfortably. Colonoscopy yesterday. Denies fever/chills, shortness of breath, chest pain. VS reviewed    Objective:    MEDICATIONS  (STANDING):  carvedilol 12.5 milliGRAM(s) Oral every 12 hours  dextrose 40% Gel 15 Gram(s) Oral once  dextrose 5%. 1000 milliLiter(s) (50 mL/Hr) IV Continuous <Continuous>  dextrose 5%. 1000 milliLiter(s) (100 mL/Hr) IV Continuous <Continuous>  dextrose 50% Injectable 25 Gram(s) IV Push once  dextrose 50% Injectable 12.5 Gram(s) IV Push once  dextrose 50% Injectable 25 Gram(s) IV Push once  enoxaparin Injectable 40 milliGRAM(s) SubCutaneous every 12 hours  furosemide    Tablet 20 milliGRAM(s) Oral daily  glucagon  Injectable 1 milliGRAM(s) IntraMuscular once  influenza   Vaccine 0.5 milliLiter(s) IntraMuscular once  insulin glargine Injectable (LANTUS) 35 Unit(s) SubCutaneous at bedtime  insulin lispro (ADMELOG) corrective regimen sliding scale   SubCutaneous three times a day before meals  lisinopril 20 milliGRAM(s) Oral daily  morphine  - Injectable 4 milliGRAM(s) IV Push once  nicotine -  14 mG/24Hr(s) Patch 1 patch Transdermal daily  pantoprazole  Injectable 40 milliGRAM(s) IV Push daily  piperacillin/tazobactam IVPB.. 3.375 Gram(s) IV Intermittent every 8 hours  polyethylene glycol/electrolyte Solution      polyethylene glycol/electrolyte Solution 1000 milliLiter(s) Oral at bedtime  tamsulosin 0.4 milliGRAM(s) Oral at bedtime    MEDICATIONS  (PRN):  acetaminophen     Tablet .. 650 milliGRAM(s) Oral every 6 hours PRN Mild Pain (1 - 3)  ALBUTerol    90 MICROgram(s) HFA Inhaler 2 Puff(s) Inhalation every 6 hours PRN Bronchospasm  ketorolac   Injectable 15 milliGRAM(s) IV Push once PRN Moderate Pain (4 - 6)  morphine  - Injectable 4 milliGRAM(s) IV Push every 6 hours PRN Severe Pain (7 - 10)  ondansetron Injectable 4 milliGRAM(s) IV Push every 6 hours PRN Nausea and/or Vomiting  traZODone 50 milliGRAM(s) Oral at bedtime PRN sleep/anxiety    Vital Signs Last 24 Hrs  T(C): 36.4 (10 Nov 2021 23:46), Max: 36.6 (10 Nov 2021 15:07)  T(F): 97.5 (10 Nov 2021 23:46), Max: 97.9 (10 Nov 2021 15:07)  HR: 61 (10 Nov 2021 23:46) (61 - 81)  BP: 121/70 (10 Nov 2021 23:46) (121/70 - 143/74)  RR: 18 (10 Nov 2021 23:46) (18 - 18)  SpO2: 94% (10 Nov 2021 23:46) (92% - 94%)      PHYSICAL EXAM   GENERAL: NAD, AOx3, well developed, morbidly obese  HEAD: Atraumatic, normocephalic  EYES: EOMI, PERRLA, conjunctiva and sclera clear  ENT: moist mucous membrane  NECK: supple, No JVD, midline trachea  CHEST/LUNG: unlabored respirations to room air  Heart: S1, S2 normal  ABDOMEN: soft, obese, non-tender, left side colostomy in place functioning  EXTREMITIES: b/l pitting edema  NERVOUS SYSTEM: AOx3, speech clear, no neuro-deficits  MSK: full ROM, no deformities    I&O's Detail    10 Nov 2021 07:01  -  11 Nov 2021 07:00  --------------------------------------------------------  IN:    Oral Fluid: 1000 mL  Total IN: 1000 mL    OUT:  Total OUT: 0 mL    Total NET: 1000 mL      LABS:                        13.5   8.37  )-----------( 389      ( 11 Nov 2021 04:45 )             43.2     11 Nov 2021 04:45    137    |  103    |  9      ----------------------------<  149    4.0     |  29     |  1.14     Ca    8.9        11 Nov 2021 04:45  Phos  3.7       11 Nov 2021 04:45  Mg     2.1       11 Nov 2021 04:45    TPro  7.4    /  Alb  2.9    /  TBili  0.5    /  DBili  x      /  AST  27     /  ALT  33     /  AlkPhos  106    11 Nov 2021 04:45    PT/INR - ( 11 Nov 2021 04:45 )   PT: 13.1 sec;   INR: 1.14 ratio         PTT - ( 11 Nov 2021 04:45 )  PTT:32.8 sec

## 2021-11-11 NOTE — BRIEF OPERATIVE NOTE - NSICDXBRIEFPOSTOP_GEN_ALL_CORE_FT
POST-OP DIAGNOSIS:  Abscess of sigmoid colon due to diverticulitis 11-Nov-2021 15:07:08  Derek Stacy  Diverticulitis 11-Nov-2021 14:47:25  Eyal Martins   POST-OP DIAGNOSIS:  Abscess of sigmoid colon due to diverticulitis 11-Nov-2021 15:07:08  Derek Stacy  Diverticulitis 11-Nov-2021 14:47:25  Eyal Martins  Colovesical fistula 11-Nov-2021 15:12:33  Derek Stacy

## 2021-11-11 NOTE — CONSULT NOTE ADULT - ASSESSMENT
A:    56M  HD # 4  FULL CODE    Here for:    1. Hypoxia 2/2  2. Atelectasis post op  3. Hx of CHF  4. Morbid obesity  5. Diverticulitis, sigmoid s/p OR  6. Hx of COPD  7. RAFFI/OHS, undiagnosed    P:    Suspect atelectasis +/- pulmonary edema s/p ex-lap, CRS  SpO2 mid 90's on 40% VM at this time, no s/s acute respiratory failure    NIPPV tonight; 16/8/.40, titrate IP to maintain Vt ~500-600, FiO2 to maintain SpO2 > 92%  Continue IVF  Zosyn for intraabdominal infection  Strongly suspect undiagnosed RAFFI/OHS; pulmonary on board, aware  Repeat CXR in AM  NPO; diet orders per CRS  Strongly encourage IS, deep breathing; IS now on exam 1750cc  Continue daily lasix, may require more aggressive diuresis  VTE ppx  ISS + lantus, maintain BG < 180    Dispo: SDU mgmt overnight. NIPPV tonight. Repeat CXR in AM.     Discussed with Derek DOUGLAS PA, [facility] MD

## 2021-11-11 NOTE — PROGRESS NOTE ADULT - SUBJECTIVE AND OBJECTIVE BOX
Subjective:    pat better, no new complaint, waiting for surgery.    Home Medications:  Admelog 100 units/mL injectable solution: Inject subcutaneously with meals per sliding scale as directed (08 Nov 2021 12:39)  ALPRAZolam 2 mg oral tablet: 1 tab(s) orally 2 times a day, As Needed (08 Nov 2021 12:39)  Aspirin Enteric Coated 81 mg oral delayed release tablet: 1 tab(s) orally once a day (08 Nov 2021 12:39)  atorvastatin 40 mg oral tablet: 1 tab(s) orally once a day (at bedtime) (08 Nov 2021 12:39)  carvedilol 12.5 mg oral tablet: 1 tab(s) orally 2 times a day (08 Nov 2021 12:39)  furosemide 40 mg oral tablet: 1 tab(s) orally once a day (08 Nov 2021 12:39)  lisinopril 20 mg oral tablet: 1 tab(s) orally once a day (08 Nov 2021 12:39)  metFORMIN 500 mg oral tablet: 1 tab(s) orally 2 times a day (08 Nov 2021 12:39)  Pfizer-BioNTech COVID-19 Vaccine PF 30 mcg/0.3 mL intramuscular suspension: 0.3 milliliter(s) intramuscular once  ***rec&#x27;d both doses*** (08 Nov 2021 12:39)  Semglee Prefilled Pen 100 units/mL subcutaneous solution: 50 unit(s) subcutaneously once a day (at bedtime) (08 Nov 2021 12:39)  tamsulosin 0.4 mg oral capsule: 1 cap(s) orally once a day (08 Nov 2021 12:39)    MEDICATIONS  (STANDING):  acetaminophen   IVPB .. 1000 milliGRAM(s) IV Intermittent once  carvedilol 12.5 milliGRAM(s) Oral every 12 hours  dextrose 40% Gel 15 Gram(s) Oral once  dextrose 5%. 1000 milliLiter(s) (50 mL/Hr) IV Continuous <Continuous>  dextrose 5%. 1000 milliLiter(s) (100 mL/Hr) IV Continuous <Continuous>  dextrose 50% Injectable 25 Gram(s) IV Push once  dextrose 50% Injectable 12.5 Gram(s) IV Push once  dextrose 50% Injectable 25 Gram(s) IV Push once  enoxaparin Injectable 40 milliGRAM(s) SubCutaneous every 12 hours  furosemide    Tablet 20 milliGRAM(s) Oral daily  glucagon  Injectable 1 milliGRAM(s) IntraMuscular once  influenza   Vaccine 0.5 milliLiter(s) IntraMuscular once  insulin glargine Injectable (LANTUS) 35 Unit(s) SubCutaneous at bedtime  insulin lispro (ADMELOG) corrective regimen sliding scale   SubCutaneous three times a day before meals  ketorolac   Injectable 15 milliGRAM(s) IV Push every 6 hours  lactated ringers. 1000 milliLiter(s) (150 mL/Hr) IV Continuous <Continuous>  lisinopril 20 milliGRAM(s) Oral daily  nicotine -  14 mG/24Hr(s) Patch 1 patch Transdermal daily  pantoprazole  Injectable 40 milliGRAM(s) IV Push daily  piperacillin/tazobactam IVPB.. 3.375 Gram(s) IV Intermittent every 8 hours  tamsulosin 0.4 milliGRAM(s) Oral at bedtime    MEDICATIONS  (PRN):  acetaminophen     Tablet .. 650 milliGRAM(s) Oral every 6 hours PRN Mild Pain (1 - 3)  ALBUTerol    90 MICROgram(s) HFA Inhaler 2 Puff(s) Inhalation every 6 hours PRN Bronchospasm  HYDROmorphone  Injectable 1 milliGRAM(s) IV Push every 3 hours PRN Severe Pain (7 - 10)  ondansetron Injectable 4 milliGRAM(s) IV Push every 6 hours PRN Nausea and/or Vomiting  oxyCODONE    IR 5 milliGRAM(s) Oral every 4 hours PRN Moderate Pain (4 - 6)  traZODone 50 milliGRAM(s) Oral at bedtime PRN sleep/anxiety      Allergies    No Known Allergies    Intolerances        Vital Signs Last 24 Hrs  T(C): 36.8 (11 Nov 2021 09:21), Max: 36.8 (11 Nov 2021 09:21)  T(F): 98.2 (11 Nov 2021 09:21), Max: 98.2 (11 Nov 2021 09:21)  HR: 80 (11 Nov 2021 09:21) (61 - 81)  BP: 133/95 (11 Nov 2021 09:21) (121/70 - 135/87)  BP(mean): --  RR: 18 (11 Nov 2021 09:21) (18 - 18)  SpO2: 91% (11 Nov 2021 09:21) (91% - 94%)      PHYSICAL EXAMINATION:    NECK:  Supple. No lymphadenopathy. Jugular venous pressure not elevated. Carotids equal.   HEART:   The cardiac impulse has a normal quality. Reg., Nl S1 and S2.  There are no murmurs, rubs or gallops noted  CHEST:  Chest is clear to auscultation. Normal respiratory effort.  ABDOMEN:  Soft and nontender.   EXTREMITIES:  There is no edema.       LABS:                        13.5   8.37  )-----------( 389      ( 11 Nov 2021 04:45 )             43.2     11-11    137  |  103  |  9   ----------------------------<  149<H>  4.0   |  29  |  1.14    Ca    8.9      11 Nov 2021 04:45  Phos  3.7     11-11  Mg     2.1     11-11    TPro  7.4  /  Alb  2.9<L>  /  TBili  0.5  /  DBili  x   /  AST  27  /  ALT  33  /  AlkPhos  106  11-11    PT/INR - ( 11 Nov 2021 04:45 )   PT: 13.1 sec;   INR: 1.14 ratio         PTT - ( 11 Nov 2021 04:45 )  PTT:32.8 sec

## 2021-11-11 NOTE — BRIEF OPERATIVE NOTE - OPERATION/FINDINGS
extensive sigmoid diverticulitis with abscess, perforation, reversal of Varela's stoma with diverting loop ileostomy extensive sigmoid diverticulitis with abscess, perforation, reversal of Varela's stoma with diverting loop ileostomy, colovesical fistula

## 2021-11-11 NOTE — BRIEF OPERATIVE NOTE - NSICDXBRIEFPREOP_GEN_ALL_CORE_FT
PRE-OP DIAGNOSIS:  Diverticulitis 11-Nov-2021 14:47:19  yEal Martins S  Abscess of sigmoid colon due to diverticulitis 11-Nov-2021 15:06:56  Derek Stacy  
PRE-OP DIAGNOSIS:  Diverticulitis 11-Nov-2021 14:47:19  Eyal Martins S

## 2021-11-11 NOTE — BRIEF OPERATIVE NOTE - NSICDXBRIEFPROCEDURE_GEN_ALL_CORE_FT
PROCEDURES:  Open resection, sigmoid colon 11-Nov-2021 15:05:40  Derek Stacy  Mobilization of splenic flexure 11-Nov-2021 15:05:54  Derek Stacy  Open loop ileostomy 11-Nov-2021 15:06:04  Derek Stacy  Reanastomosis, colon, with reversal of Kamilla pouch 11-Nov-2021 15:06:28  Derek Stacy   PROCEDURES:  Open resection, sigmoid colon 11-Nov-2021 15:05:40  Regis, Romel  Mobilization of splenic flexure 11-Nov-2021 15:05:54  Regis, Romel  Open loop ileostomy 11-Nov-2021 15:06:04  RegisDerek avery A  Reanastomosis, colon, with reversal of Kamilla pouch 11-Nov-2021 15:06:28  RegisDerek  Repair of colovesical fistula 11-Nov-2021 15:11:53  RegisDerek  Evacuation of pelvic abscess 11-Nov-2021 15:12:03  Derek Stacy

## 2021-11-11 NOTE — CONSULT NOTE ADULT - CONSULT REASON
Post op hypoxia
preop for reversal of his left colostomy 7/2021,  h/o DM, CHF, HTN, pls eval for colonoscopy
pulmonary management
preop for reversal of his colostomy h/o DM, CHF, HTN,

## 2021-11-11 NOTE — PROGRESS NOTE ADULT - ASSESSMENT
57yo M with multiple comorbidities, including diverticulitis s/p left side colostomy here for sigmoidectomy and colostomy reversal  s/p stress test  normal. s/p bowel prep yesterday, colostomy with clear liquid.    Plan:  plan surgery today  pain control prn  monitor VS   NPO, surgery today  GI recs appreciated  endocrine recs appreciated for uncontrolled DM, last A1C 11.4. Glc better controlled  cardiology, medicine recs appreciated for surgical clearances  appreciate pulmonology clearance  continue home meds  GI/DVT prop    Plan discussed with surgery team and colorectal attending, Dr. Lagos

## 2021-11-11 NOTE — PROGRESS NOTE ADULT - ASSESSMENT
55 y/o male w/ pmhx of DM, HTN, CHF, b/l LE swelling, Obesity (BMI 49), h/o diverticulitis/diverticular abscess s/p left colostomy 07/2021 presenting for re-evaluation of colostomy/requesting reversal.     # abd pain due to colostomy and hx of diverticulitis   - cont w/ zosyn Q 8 hrs   - plan for colostomy reversal with Dr. Jensen today   - s/p colonoscopy   - s/p nuclear stress test ,ECHO results reviewed   - cardio cleared   - pulm - optimized no contraindications for procedure   - analgesia PRN     # hx of DM 2   - cont w/ lantus 30 and ISS   - RD consulted     # chronic diastolic heart failure   - echo and stress test completed. patient optimized for surgery   - cardio Dr. Powers     55 y/o male w/ pmhx of DM, HTN, CHF, b/l LE swelling, Obesity (BMI 49), h/o diverticulitis/diverticular abscess s/p left colostomy 07/2021 presenting for re-evaluation of colostomy/requesting reversal.     # abd pain due to colostomy and hx of diverticulitis   - cont w/ zosyn Q 8 hrs   - plan for colostomy reversal with Dr. Jensen today   - s/p colonoscopy   - s/p nuclear stress test ,ECHO results reviewed   - cardio cleared   - pulm - optimized no contraindications for procedure   - analgesia PRN     # hx of DM 2   - cont w/ lantus 35 and ISS   - RD consulted     # chronic diastolic heart failure   - echo and stress test completed. patient optimized for surgery   - cardio Dr. Powers

## 2021-11-11 NOTE — PROGRESS NOTE ADULT - ASSESSMENT
A/P:  55 yo M with multiple comorbidities h/o of DM, HTN, CHF, b/l LE swelling, Obesity(BMI 49), h/o smoking, possible RAFFI & h/o diverticulitis/diverticular abscess s/p left colostomy 07/2021 with left side colostomy waiting reversal    Plan:     pulmonary optimizized, there is no absolute contra-indication for the plan procedure watch for any apneic episode postop  incentive scotty  sleep study as outpat  aerosols  nicotine patch  pain control  d/w staff  GI/DVT prop

## 2021-11-11 NOTE — PROGRESS NOTE ADULT - SUBJECTIVE AND OBJECTIVE BOX
55 yo M with PMH of DM, HTN, CHF, b/l LE swelling, Obesity(BMI 49), h/o diverticulitis/diverticular abscess s/p left colostomy 07/2021 at [facility] was seen in the ED for abdominal pain. Pt reports he is suffering from this colostomy bag and wants it reversed. Denies fever or chills, n/v/d/c. Pt reports the colostomy is functional.    PMH: as above  PSH: left colostomy 7/2021  Allergy: NKDA  SH: + smoking cig daily for last 20 yrs, denies etOH or illicit drug (08 Nov 2021 12:23)    11/9: sitting up in bed, pain and discomfort improving. no fever or diarrhea today. plan for colonoscopy in AM   11/10: sitting up at edge of bed, reports no pain or discomfort, BGMs improving, plan for ostomy reversal with new ostomy in AM.   11/11: seen and examined sitting up in bed. no new issues. awaiting OR today for ostomy     REVIEW OF SYSTEMS:    CONSTITUTIONAL: No weakness, fevers or chills  EYES/ENT: No visual changes;  No vertigo or throat pain   NECK: No pain or stiffness  RESPIRATORY: No cough, wheezing, hemoptysis; No shortness of breath  CARDIOVASCULAR: No chest pain or palpitations  GASTROINTESTINAL: No abdominal or epigastric pain. No nausea, vomiting, or hematemesis; No diarrhea or constipation. No melena or hematochezia.  GENITOURINARY: No dysuria, frequency or hematuria  NEUROLOGICAL: No numbness or weakness  SKIN: No itching, burning, rashes, or lesions   All other review of systems is negative unless indicated above    Vital Signs Last 24 Hrs  T(C): 36.8 (11 Nov 2021 09:21), Max: 36.8 (11 Nov 2021 09:21)  T(F): 98.2 (11 Nov 2021 09:21), Max: 98.2 (11 Nov 2021 09:21)  HR: 80 (11 Nov 2021 09:21) (61 - 81)  BP: 133/95 (11 Nov 2021 09:21) (121/70 - 143/74)  BP(mean): --  RR: 18 (11 Nov 2021 09:21) (18 - 18)  SpO2: 91% (11 Nov 2021 09:21) (91% - 94%)    I&O's Summary      CAPILLARY BLOOD GLUCOSE      POCT Blood Glucose.: 124 mg/dL (11 Nov 2021 11:40)  POCT Blood Glucose.: 124 mg/dL (11 Nov 2021 05:54)  POCT Blood Glucose.: 131 mg/dL (10 Nov 2021 21:39)  POCT Blood Glucose.: 178 mg/dL (10 Nov 2021 16:41)      PHYSICAL EXAM:    Constitutional: NAD, awake and alert, obese   HEENT: PERR, EOMI, Normal Hearing, MMM  Neck: Soft and supple, No LAD, No JVD  Respiratory: Breath sounds are clear bilaterally, No wheezing, rales or rhonchi  Cardiovascular: S1 and S2, regular rate and rhythm, no Murmurs, gallops or rubs  Gastrointestinal: Bowel Sounds present, soft, nontender, nondistended, no guarding, no rebound. + ostomy with stool   Extremities: No peripheral edema  Vascular: 2+ peripheral pulses  Neurological: A/O x 3, no focal deficits  Musculoskeletal: 5/5 strength b/l upper and lower extremities  Skin: No rashes    MEDICATIONS  (STANDING):  carvedilol 12.5 milliGRAM(s) Oral every 12 hours  dextrose 40% Gel 15 Gram(s) Oral once  dextrose 5%. 1000 milliLiter(s) (50 mL/Hr) IV Continuous <Continuous>  dextrose 5%. 1000 milliLiter(s) (100 mL/Hr) IV Continuous <Continuous>  dextrose 50% Injectable 25 Gram(s) IV Push once  dextrose 50% Injectable 12.5 Gram(s) IV Push once  dextrose 50% Injectable 25 Gram(s) IV Push once  enoxaparin Injectable 40 milliGRAM(s) SubCutaneous every 12 hours  furosemide    Tablet 20 milliGRAM(s) Oral daily  glucagon  Injectable 1 milliGRAM(s) IntraMuscular once  influenza   Vaccine 0.5 milliLiter(s) IntraMuscular once  insulin glargine Injectable (LANTUS) 35 Unit(s) SubCutaneous at bedtime  insulin lispro (ADMELOG) corrective regimen sliding scale   SubCutaneous three times a day before meals  lisinopril 20 milliGRAM(s) Oral daily  metroNIDAZOLE    Tablet 500 milliGRAM(s) Oral <User Schedule>  morphine  - Injectable 4 milliGRAM(s) IV Push once  neomycin 1000 milliGRAM(s) Oral <User Schedule>  nicotine -  14 mG/24Hr(s) Patch 1 patch Transdermal daily  pantoprazole  Injectable 40 milliGRAM(s) IV Push daily  piperacillin/tazobactam IVPB.. 3.375 Gram(s) IV Intermittent every 8 hours  tamsulosin 0.4 milliGRAM(s) Oral at bedtime      LABS: All Labs Reviewed:                            13.5   8.37  )-----------( 389      ( 11 Nov 2021 04:45 )             43.2     11-11    137  |  103  |  9   ----------------------------<  149<H>  4.0   |  29  |  1.14    Ca    8.9      11 Nov 2021 04:45  Phos  3.7     11-11  Mg     2.1     11-11    TPro  7.4  /  Alb  2.9<L>  /  TBili  0.5  /  DBili  x   /  AST  27  /  ALT  33  /  AlkPhos  106  11-11        LIVER FUNCTIONS - ( 11 Nov 2021 04:45 )  Alb: 2.9 g/dL / Pro: 7.4 gm/dL / ALK PHOS: 106 U/L / ALT: 33 U/L / AST: 27 U/L / GGT: x           PT/INR - ( 11 Nov 2021 04:45 )   PT: 13.1 sec;   INR: 1.14 ratio  PTT - ( 11 Nov 2021 04:45 )  PTT:32.8 sec        RADIOLOGY/EKG:    < from: CT Abdomen and Pelvis w/ Oral Cont and w/ IV Cont (11.08.21 @ 13:18) >  ABDOMINAL WALL: There are bilateral fat-containing inguinal hernias right greater left. There is skin thickening and subcutaneous edema inthe lower anterior abdominal wall also similar to the prior study  BONES: Degenerative changes.    IMPRESSION: Diverticulitis of the sigmoid colon is again seen status post left lower quadrant colostomy. 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 Cannot rule out abscess formation in the anterior bladder wall. Findings are without significant change from the recent prior study    --- End of Report ---  [facility] MD; Attending Radiologist  This document has been electronically signed. Nov 8 2021  2:17PM    COLONOSCOPY   Scope via ostomy to ileum without mass lesion or large polyp.   Ellston's pouch with inflammatory changes but no mass or large polyp.

## 2021-11-11 NOTE — BRIEF OPERATIVE NOTE - OPERATION/FINDINGS
1. Generalized erythematous bladder mucosa, more pronounced along posterior bladder wall  2. UOs retracted laterally and proximally  3. Thickened bladder wall on CT scan(h/o colovesical fistula)  4. Irregular left distal ureter on retrograde pyelogram

## 2021-11-12 LAB
ALBUMIN SERPL ELPH-MCNC: 2.4 G/DL — LOW (ref 3.3–5)
ALP SERPL-CCNC: 85 U/L — SIGNIFICANT CHANGE UP (ref 40–120)
ALT FLD-CCNC: 28 U/L — SIGNIFICANT CHANGE UP (ref 12–78)
ANION GAP SERPL CALC-SCNC: 6 MMOL/L — SIGNIFICANT CHANGE UP (ref 5–17)
AST SERPL-CCNC: 23 U/L — SIGNIFICANT CHANGE UP (ref 15–37)
BASOPHILS # BLD AUTO: 0.04 K/UL — SIGNIFICANT CHANGE UP (ref 0–0.2)
BASOPHILS NFR BLD AUTO: 0.2 % — SIGNIFICANT CHANGE UP (ref 0–2)
BILIRUB SERPL-MCNC: 0.5 MG/DL — SIGNIFICANT CHANGE UP (ref 0.2–1.2)
BUN SERPL-MCNC: 14 MG/DL — SIGNIFICANT CHANGE UP (ref 7–23)
CALCIUM SERPL-MCNC: 8.2 MG/DL — LOW (ref 8.5–10.1)
CHLORIDE SERPL-SCNC: 104 MMOL/L — SIGNIFICANT CHANGE UP (ref 96–108)
CO2 SERPL-SCNC: 29 MMOL/L — SIGNIFICANT CHANGE UP (ref 22–31)
CREAT SERPL-MCNC: 1.66 MG/DL — HIGH (ref 0.5–1.3)
EOSINOPHIL # BLD AUTO: 0.01 K/UL — SIGNIFICANT CHANGE UP (ref 0–0.5)
EOSINOPHIL NFR BLD AUTO: 0.1 % — SIGNIFICANT CHANGE UP (ref 0–6)
GLUCOSE SERPL-MCNC: 178 MG/DL — HIGH (ref 70–99)
HCT VFR BLD CALC: 43.4 % — SIGNIFICANT CHANGE UP (ref 39–50)
HGB BLD-MCNC: 13.1 G/DL — SIGNIFICANT CHANGE UP (ref 13–17)
IMM GRANULOCYTES NFR BLD AUTO: 0.7 % — SIGNIFICANT CHANGE UP (ref 0–1.5)
LYMPHOCYTES # BLD AUTO: 0.72 K/UL — LOW (ref 1–3.3)
LYMPHOCYTES # BLD AUTO: 3.9 % — LOW (ref 13–44)
MAGNESIUM SERPL-MCNC: 2.1 MG/DL — SIGNIFICANT CHANGE UP (ref 1.6–2.6)
MCHC RBC-ENTMCNC: 26.8 PG — LOW (ref 27–34)
MCHC RBC-ENTMCNC: 30.2 GM/DL — LOW (ref 32–36)
MCV RBC AUTO: 88.8 FL — SIGNIFICANT CHANGE UP (ref 80–100)
MONOCYTES # BLD AUTO: 1.16 K/UL — HIGH (ref 0–0.9)
MONOCYTES NFR BLD AUTO: 6.3 % — SIGNIFICANT CHANGE UP (ref 2–14)
NEUTROPHILS # BLD AUTO: 16.31 K/UL — HIGH (ref 1.8–7.4)
NEUTROPHILS NFR BLD AUTO: 88.8 % — HIGH (ref 43–77)
PHOSPHATE SERPL-MCNC: 6 MG/DL — HIGH (ref 2.5–4.5)
PHOSPHATE SERPL-MCNC: 6.5 MG/DL — HIGH (ref 2.5–4.5)
PLATELET # BLD AUTO: 351 K/UL — SIGNIFICANT CHANGE UP (ref 150–400)
POTASSIUM SERPL-MCNC: 4.5 MMOL/L — SIGNIFICANT CHANGE UP (ref 3.5–5.3)
POTASSIUM SERPL-MCNC: 5.3 MMOL/L — SIGNIFICANT CHANGE UP (ref 3.5–5.3)
POTASSIUM SERPL-SCNC: 4.5 MMOL/L — SIGNIFICANT CHANGE UP (ref 3.5–5.3)
POTASSIUM SERPL-SCNC: 5.3 MMOL/L — SIGNIFICANT CHANGE UP (ref 3.5–5.3)
PROT SERPL-MCNC: 6.4 GM/DL — SIGNIFICANT CHANGE UP (ref 6–8.3)
RBC # BLD: 4.89 M/UL — SIGNIFICANT CHANGE UP (ref 4.2–5.8)
RBC # FLD: 14.4 % — SIGNIFICANT CHANGE UP (ref 10.3–14.5)
SODIUM SERPL-SCNC: 139 MMOL/L — SIGNIFICANT CHANGE UP (ref 135–145)
WBC # BLD: 18.37 K/UL — HIGH (ref 3.8–10.5)
WBC # FLD AUTO: 18.37 K/UL — HIGH (ref 3.8–10.5)

## 2021-11-12 PROCEDURE — 99233 SBSQ HOSP IP/OBS HIGH 50: CPT

## 2021-11-12 PROCEDURE — 71045 X-RAY EXAM CHEST 1 VIEW: CPT | Mod: 26

## 2021-11-12 RX ORDER — PIPERACILLIN AND TAZOBACTAM 4; .5 G/20ML; G/20ML
3.38 INJECTION, POWDER, LYOPHILIZED, FOR SOLUTION INTRAVENOUS EVERY 12 HOURS
Refills: 0 | Status: ACTIVE | OUTPATIENT
Start: 2021-11-12 | End: 2022-10-11

## 2021-11-12 RX ORDER — HEPARIN SODIUM 5000 [USP'U]/ML
5000 INJECTION INTRAVENOUS; SUBCUTANEOUS EVERY 8 HOURS
Refills: 0 | Status: ACTIVE | OUTPATIENT
Start: 2021-11-12 | End: 2022-10-11

## 2021-11-12 RX ADMIN — Medication 20 MILLIGRAM(S): at 11:15

## 2021-11-12 RX ADMIN — ENOXAPARIN SODIUM 40 MILLIGRAM(S): 100 INJECTION SUBCUTANEOUS at 11:14

## 2021-11-12 RX ADMIN — Medication 1000 MILLIGRAM(S): at 04:45

## 2021-11-12 RX ADMIN — Medication 400 MILLIGRAM(S): at 04:30

## 2021-11-12 RX ADMIN — Medication 4: at 17:02

## 2021-11-12 RX ADMIN — Medication 2: at 06:13

## 2021-11-12 RX ADMIN — PANTOPRAZOLE SODIUM 40 MILLIGRAM(S): 20 TABLET, DELAYED RELEASE ORAL at 11:12

## 2021-11-12 RX ADMIN — PIPERACILLIN AND TAZOBACTAM 25 GRAM(S): 4; .5 INJECTION, POWDER, LYOPHILIZED, FOR SOLUTION INTRAVENOUS at 22:57

## 2021-11-12 RX ADMIN — ONDANSETRON 4 MILLIGRAM(S): 8 TABLET, FILM COATED ORAL at 05:55

## 2021-11-12 RX ADMIN — CARVEDILOL PHOSPHATE 12.5 MILLIGRAM(S): 80 CAPSULE, EXTENDED RELEASE ORAL at 22:56

## 2021-11-12 RX ADMIN — INSULIN GLARGINE 35 UNIT(S): 100 INJECTION, SOLUTION SUBCUTANEOUS at 23:00

## 2021-11-12 RX ADMIN — Medication 40 MILLIGRAM(S): at 13:30

## 2021-11-12 RX ADMIN — Medication 15 MILLIGRAM(S): at 04:45

## 2021-11-12 RX ADMIN — PIPERACILLIN AND TAZOBACTAM 25 GRAM(S): 4; .5 INJECTION, POWDER, LYOPHILIZED, FOR SOLUTION INTRAVENOUS at 05:48

## 2021-11-12 RX ADMIN — Medication 15 MILLIGRAM(S): at 04:30

## 2021-11-12 RX ADMIN — LISINOPRIL 20 MILLIGRAM(S): 2.5 TABLET ORAL at 11:13

## 2021-11-12 RX ADMIN — CARVEDILOL PHOSPHATE 12.5 MILLIGRAM(S): 80 CAPSULE, EXTENDED RELEASE ORAL at 11:15

## 2021-11-12 RX ADMIN — TAMSULOSIN HYDROCHLORIDE 0.4 MILLIGRAM(S): 0.4 CAPSULE ORAL at 22:56

## 2021-11-12 RX ADMIN — HEPARIN SODIUM 5000 UNIT(S): 5000 INJECTION INTRAVENOUS; SUBCUTANEOUS at 22:55

## 2021-11-12 NOTE — PROGRESS NOTE ADULT - ASSESSMENT
55yo M with multiple comorbidities, including diverticulitis s/p left side colostomy here for sigmoidectomy and colostomy reversal  s/p stress test  normal.  11/11--> s/p ex-lap, open resection, colostomy takedown/reversal, reanastomosis, creation of diverting loop ileostomy, repair of colovesical fistula, evacuation of pelvic abscess.    Plan:  pain control prn  monitor VS   sips and chips today  GI recs appreciated   keep frank in place for 7 days  will remove stents today  IV abx for total of 3 days  endocrine recs appreciated for uncontrolled DM, last A1C 11.4. Glc better controlled  cardiology, medicine recs appreciated for surgical clearances  appreciate pulmonology consult for postop desaturation, currently on BiPAP, suspecting undiagnosed RAFFI  continue home meds  ambulation  GI/DVT prop    Plan discussed with colorectal surgery team and attending, Dr. Lagos 55yo M with multiple comorbidities, including poorly controlled diabetes and morbid obesity, with diverticulitis s/p left side colostomy here for sigmoidectomy and colostomy reversal  s/p stress test  normal.  11/11--> s/p ex-lap, open resection, colostomy takedown/reversal, reanastomosis, creation of diverting loop ileostomy, repair of colovesical fistula, evacuation of pelvic abscess.    Plan:  pain control prn  monitor VS   sips and chips today  GI recs appreciated   keep frank in place for 7 days  will remove stents today  IV abx for total of 3 days  endocrine recs appreciated for uncontrolled DM, last A1C 11.4. Glc better controlled  cardiology, medicine recs appreciated for surgical clearances  appreciate pulmonology consult for postop desaturation, currently on BiPAP, suspecting undiagnosed RAFFI  continue home meds  ambulation  GI/DVT prop    Plan discussed with colorectal surgery team and attending, Dr. Lagos

## 2021-11-12 NOTE — PROGRESS NOTE ADULT - SUBJECTIVE AND OBJECTIVE BOX
SURGERY DAILY PROGRESS NOTE:     Subjective:  Patient seen and examined this AM at bedside. No acute events overnight and patient resting comfortably. Denies fever/chills, shortness of breath, chest pain. VS reviewed    Objective:  MEDICATIONS  (STANDING):  carvedilol 12.5 milliGRAM(s) Oral every 12 hours  dextrose 40% Gel 15 Gram(s) Oral once  dextrose 5%. 1000 milliLiter(s) (50 mL/Hr) IV Continuous <Continuous>  dextrose 5%. 1000 milliLiter(s) (100 mL/Hr) IV Continuous <Continuous>  dextrose 50% Injectable 25 Gram(s) IV Push once  dextrose 50% Injectable 12.5 Gram(s) IV Push once  dextrose 50% Injectable 25 Gram(s) IV Push once  enoxaparin Injectable 40 milliGRAM(s) SubCutaneous every 12 hours  furosemide    Tablet 20 milliGRAM(s) Oral daily  glucagon  Injectable 1 milliGRAM(s) IntraMuscular once  HYDROmorphone PCA (1 mG/mL) 30 milliLiter(s) PCA Continuous PCA Continuous  influenza   Vaccine 0.5 milliLiter(s) IntraMuscular once  insulin glargine Injectable (LANTUS) 35 Unit(s) SubCutaneous at bedtime  insulin lispro (ADMELOG) corrective regimen sliding scale   SubCutaneous three times a day before meals  ketorolac   Injectable 15 milliGRAM(s) IV Push every 6 hours  lactated ringers. 1000 milliLiter(s) (125 mL/Hr) IV Continuous <Continuous>  lisinopril 20 milliGRAM(s) Oral daily  nicotine -  14 mG/24Hr(s) Patch 1 patch Transdermal daily  pantoprazole  Injectable 40 milliGRAM(s) IV Push daily  piperacillin/tazobactam IVPB.. 3.375 Gram(s) IV Intermittent every 8 hours  tamsulosin 0.4 milliGRAM(s) Oral at bedtime    MEDICATIONS  (PRN):  acetaminophen     Tablet .. 650 milliGRAM(s) Oral every 6 hours PRN Mild Pain (1 - 3)  ALBUTerol    90 MICROgram(s) HFA Inhaler 2 Puff(s) Inhalation every 6 hours PRN Bronchospasm  HYDROmorphone PCA (1 mG/mL) Rescue Clinician Bolus 0.5 milliGRAM(s) IV Push every 15 minutes PRN for Pain Scale GREATER THAN 6  naloxone Injectable 0.1 milliGRAM(s) IV Push every 3 minutes PRN For ANY of the following changes in patient status:  A. RR LESS THAN 10 breaths per minute, B. Oxygen saturation LESS THAN 90%, C. Sedation score of 6  ondansetron Injectable 4 milliGRAM(s) IV Push every 6 hours PRN Nausea and/or Vomiting  ondansetron Injectable 4 milliGRAM(s) IV Push every 6 hours PRN Nausea  traZODone 50 milliGRAM(s) Oral at bedtime PRN sleep/anxiety    Vital Signs Last 24 Hrs  T(C): 36.4 (12 Nov 2021 06:38), Max: 36.8 (11 Nov 2021 09:21)  T(F): 97.6 (12 Nov 2021 06:38), Max: 98.2 (11 Nov 2021 09:21)  HR: 59 (12 Nov 2021 07:02) (58 - 90)  BP: 110/59 (12 Nov 2021 07:02) (95/60 - 163/91)  BP(mean): 71 (12 Nov 2021 07:02) (68 - 94)  RR: 17 (12 Nov 2021 07:02) (12 - 25)  SpO2: 95% (12 Nov 2021 07:02) (90% - 100%)    PHYSICAL EXAM   GENERAL: NAD, AOx3, well developed, morbidly obese  HEAD: Atraumatic, normocephalic  EYES: EOMI, PERRLA, conjunctiva and sclera clear  ENT: moist mucous membrane  NECK: supple, No JVD, midline trachea  CHEST/LUNG: unlabored respirations to room air  Heart: S1, S2 normal  ABDOMEN: soft, obese, non-tender, right side ileostomy in place with sweat, midline dressing mildly soiled, changed at bedside this am  : frank in place with blood tinged urine, x2 blue stent coming out of frank  EXTREMITIES: b/l pitting edema  NERVOUS SYSTEM: AOx3, speech clear, no neuro-deficits  MSK: full ROM, no deformities    I&O's Detail    11 Nov 2021 07:01  -  12 Nov 2021 07:00  --------------------------------------------------------  IN:    Lactated Ringers: 422 mL    Other (mL): 1500 mL  Total IN: 1922 mL    OUT:    Indwelling Catheter - Urethral (mL): 900 mL    Other (mL): 400 mL  Total OUT: 1300 mL    Total NET: 622 mL    LABS:                        13.1   18.37 )-----------( 351      ( 12 Nov 2021 05:09 )             43.4     12 Nov 2021 05:09    139    |  104    |  14     ----------------------------<  178    5.3     |  29     |  1.66     Ca    8.2        12 Nov 2021 05:09  Phos  6.5       12 Nov 2021 05:09  Mg     2.1       12 Nov 2021 05:09    TPro  6.4    /  Alb  2.4    /  TBili  0.5    /  DBili  x      /  AST  23     /  ALT  28     /  AlkPhos  85     12 Nov 2021 05:09    PT/INR - ( 11 Nov 2021 04:45 )   PT: 13.1 sec;   INR: 1.14 ratio         PTT - ( 11 Nov 2021 04:45 )  PTT:32.8 sec       SURGERY DAILY PROGRESS NOTE:     Subjective:  Patient seen and examined this AM at bedside. No acute events overnight and patient resting comfortably. Denies fever/chills, shortness of breath, chest pain. VS reviewed.  Was on BiPAP this morning.    Objective:  MEDICATIONS  (STANDING):  carvedilol 12.5 milliGRAM(s) Oral every 12 hours  dextrose 40% Gel 15 Gram(s) Oral once  dextrose 5%. 1000 milliLiter(s) (50 mL/Hr) IV Continuous <Continuous>  dextrose 5%. 1000 milliLiter(s) (100 mL/Hr) IV Continuous <Continuous>  dextrose 50% Injectable 25 Gram(s) IV Push once  dextrose 50% Injectable 12.5 Gram(s) IV Push once  dextrose 50% Injectable 25 Gram(s) IV Push once  enoxaparin Injectable 40 milliGRAM(s) SubCutaneous every 12 hours  furosemide    Tablet 20 milliGRAM(s) Oral daily  glucagon  Injectable 1 milliGRAM(s) IntraMuscular once  HYDROmorphone PCA (1 mG/mL) 30 milliLiter(s) PCA Continuous PCA Continuous  influenza   Vaccine 0.5 milliLiter(s) IntraMuscular once  insulin glargine Injectable (LANTUS) 35 Unit(s) SubCutaneous at bedtime  insulin lispro (ADMELOG) corrective regimen sliding scale   SubCutaneous three times a day before meals  ketorolac   Injectable 15 milliGRAM(s) IV Push every 6 hours  lactated ringers. 1000 milliLiter(s) (125 mL/Hr) IV Continuous <Continuous>  lisinopril 20 milliGRAM(s) Oral daily  nicotine -  14 mG/24Hr(s) Patch 1 patch Transdermal daily  pantoprazole  Injectable 40 milliGRAM(s) IV Push daily  piperacillin/tazobactam IVPB.. 3.375 Gram(s) IV Intermittent every 8 hours  tamsulosin 0.4 milliGRAM(s) Oral at bedtime    MEDICATIONS  (PRN):  acetaminophen     Tablet .. 650 milliGRAM(s) Oral every 6 hours PRN Mild Pain (1 - 3)  ALBUTerol    90 MICROgram(s) HFA Inhaler 2 Puff(s) Inhalation every 6 hours PRN Bronchospasm  HYDROmorphone PCA (1 mG/mL) Rescue Clinician Bolus 0.5 milliGRAM(s) IV Push every 15 minutes PRN for Pain Scale GREATER THAN 6  naloxone Injectable 0.1 milliGRAM(s) IV Push every 3 minutes PRN For ANY of the following changes in patient status:  A. RR LESS THAN 10 breaths per minute, B. Oxygen saturation LESS THAN 90%, C. Sedation score of 6  ondansetron Injectable 4 milliGRAM(s) IV Push every 6 hours PRN Nausea and/or Vomiting  ondansetron Injectable 4 milliGRAM(s) IV Push every 6 hours PRN Nausea  traZODone 50 milliGRAM(s) Oral at bedtime PRN sleep/anxiety    Vital Signs Last 24 Hrs  T(C): 36.4 (12 Nov 2021 06:38), Max: 36.8 (11 Nov 2021 09:21)  T(F): 97.6 (12 Nov 2021 06:38), Max: 98.2 (11 Nov 2021 09:21)  HR: 59 (12 Nov 2021 07:02) (58 - 90)  BP: 110/59 (12 Nov 2021 07:02) (95/60 - 163/91)  BP(mean): 71 (12 Nov 2021 07:02) (68 - 94)  RR: 17 (12 Nov 2021 07:02) (12 - 25)  SpO2: 95% (12 Nov 2021 07:02) (90% - 100%)    PHYSICAL EXAM   GENERAL: NAD, AOx3, well developed, morbidly obese  HEAD: Atraumatic, normocephalic  EYES: EOMI, PERRLA, conjunctiva and sclera clear  ENT: moist mucous membrane  NECK: supple, No JVD, midline trachea  CHEST/LUNG: unlabored respirations to room air  Heart: S1, S2 normal  ABDOMEN: soft, obese, non-tender, right side ileostomy in place with sweat, midline dressing mildly soiled, changed at bedside this am  : frank in place with blood tinged urine, x2 blue stent coming out of frank  EXTREMITIES: b/l pitting edema  NERVOUS SYSTEM: AOx3, speech clear, no neuro-deficits  MSK: full ROM, no deformities    I&O's Detail    11 Nov 2021 07:01  -  12 Nov 2021 07:00  --------------------------------------------------------  IN:    Lactated Ringers: 422 mL    Other (mL): 1500 mL  Total IN: 1922 mL    OUT:    Indwelling Catheter - Urethral (mL): 900 mL    Other (mL): 400 mL  Total OUT: 1300 mL    Total NET: 622 mL    LABS:                        13.1   18.37 )-----------( 351      ( 12 Nov 2021 05:09 )             43.4     12 Nov 2021 05:09    139    |  104    |  14     ----------------------------<  178    5.3     |  29     |  1.66     Ca    8.2        12 Nov 2021 05:09  Phos  6.5       12 Nov 2021 05:09  Mg     2.1       12 Nov 2021 05:09    TPro  6.4    /  Alb  2.4    /  TBili  0.5    /  DBili  x      /  AST  23     /  ALT  28     /  AlkPhos  85     12 Nov 2021 05:09    PT/INR - ( 11 Nov 2021 04:45 )   PT: 13.1 sec;   INR: 1.14 ratio         PTT - ( 11 Nov 2021 04:45 )  PTT:32.8 sec

## 2021-11-12 NOTE — PROGRESS NOTE ADULT - SUBJECTIVE AND OBJECTIVE BOX
Subjective:    pat post-op  respiratory distress required 100% NRB-bipap 10/5 last night now better on bipap, CXR fluid overload-fu CXR better.    Home Medications:  Admelog 100 units/mL injectable solution: Inject subcutaneously with meals per sliding scale as directed (08 Nov 2021 12:39)  ALPRAZolam 2 mg oral tablet: 1 tab(s) orally 2 times a day, As Needed (08 Nov 2021 12:39)  Aspirin Enteric Coated 81 mg oral delayed release tablet: 1 tab(s) orally once a day (08 Nov 2021 12:39)  atorvastatin 40 mg oral tablet: 1 tab(s) orally once a day (at bedtime) (08 Nov 2021 12:39)  carvedilol 12.5 mg oral tablet: 1 tab(s) orally 2 times a day (08 Nov 2021 12:39)  furosemide 40 mg oral tablet: 1 tab(s) orally once a day (08 Nov 2021 12:39)  lisinopril 20 mg oral tablet: 1 tab(s) orally once a day (08 Nov 2021 12:39)  metFORMIN 500 mg oral tablet: 1 tab(s) orally 2 times a day (08 Nov 2021 12:39)  Pfizer-BioNTech COVID-19 Vaccine PF 30 mcg/0.3 mL intramuscular suspension: 0.3 milliliter(s) intramuscular once  ***rec&#x27;d both doses*** (08 Nov 2021 12:39)  Semglee Prefilled Pen 100 units/mL subcutaneous solution: 50 unit(s) subcutaneously once a day (at bedtime) (08 Nov 2021 12:39)  tamsulosin 0.4 mg oral capsule: 1 cap(s) orally once a day (08 Nov 2021 12:39)    MEDICATIONS  (STANDING):  carvedilol 12.5 milliGRAM(s) Oral every 12 hours  dextrose 40% Gel 15 Gram(s) Oral once  dextrose 5%. 1000 milliLiter(s) (50 mL/Hr) IV Continuous <Continuous>  dextrose 5%. 1000 milliLiter(s) (100 mL/Hr) IV Continuous <Continuous>  dextrose 50% Injectable 25 Gram(s) IV Push once  dextrose 50% Injectable 12.5 Gram(s) IV Push once  dextrose 50% Injectable 25 Gram(s) IV Push once  glucagon  Injectable 1 milliGRAM(s) IntraMuscular once  heparin   Injectable 5000 Unit(s) SubCutaneous every 8 hours  HYDROmorphone PCA (1 mG/mL) 30 milliLiter(s) PCA Continuous PCA Continuous  influenza   Vaccine 0.5 milliLiter(s) IntraMuscular once  insulin glargine Injectable (LANTUS) 35 Unit(s) SubCutaneous at bedtime  insulin lispro (ADMELOG) corrective regimen sliding scale   SubCutaneous three times a day before meals  lactated ringers. 1000 milliLiter(s) (125 mL/Hr) IV Continuous <Continuous>  nicotine -  14 mG/24Hr(s) Patch 1 patch Transdermal daily  pantoprazole  Injectable 40 milliGRAM(s) IV Push daily  piperacillin/tazobactam IVPB.. 3.375 Gram(s) IV Intermittent every 12 hours  tamsulosin 0.4 milliGRAM(s) Oral at bedtime    MEDICATIONS  (PRN):  acetaminophen     Tablet .. 650 milliGRAM(s) Oral every 6 hours PRN Mild Pain (1 - 3)  ALBUTerol    90 MICROgram(s) HFA Inhaler 2 Puff(s) Inhalation every 6 hours PRN Bronchospasm  HYDROmorphone PCA (1 mG/mL) Rescue Clinician Bolus 0.5 milliGRAM(s) IV Push every 15 minutes PRN for Pain Scale GREATER THAN 6  naloxone Injectable 0.1 milliGRAM(s) IV Push every 3 minutes PRN For ANY of the following changes in patient status:  A. RR LESS THAN 10 breaths per minute, B. Oxygen saturation LESS THAN 90%, C. Sedation score of 6  ondansetron Injectable 4 milliGRAM(s) IV Push every 6 hours PRN Nausea  ondansetron Injectable 4 milliGRAM(s) IV Push every 6 hours PRN Nausea and/or Vomiting  traZODone 50 milliGRAM(s) Oral at bedtime PRN sleep/anxiety      Allergies    No Known Allergies    Intolerances        Vital Signs Last 24 Hrs  T(C): 36.1 (12 Nov 2021 09:35), Max: 36.7 (11 Nov 2021 16:31)  T(F): 97 (12 Nov 2021 09:35), Max: 98.1 (11 Nov 2021 16:31)  HR: 67 (12 Nov 2021 16:00) (57 - 90)  BP: 106/63 (12 Nov 2021 14:00) (91/59 - 163/91)  BP(mean): 73 (12 Nov 2021 14:00) (65 - 94)  RR: 21 (12 Nov 2021 16:00) (12 - 25)  SpO2: 94% (12 Nov 2021 16:00) (90% - 100%)      PHYSICAL EXAMINATION:    NECK:  Supple. No lymphadenopathy. Jugular venous pressure not elevated. Carotids equal.   HEART:   The cardiac impulse has a normal quality. Reg., Nl S1 and S2.  There are no murmurs, rubs or gallops noted  CHEST:  Chest crackles to auscultation. Normal respiratory effort.  ABDOMEN:  Soft and nontender.   EXTREMITIES:  There is no edema.       LABS:                        13.1   18.37 )-----------( 351      ( 12 Nov 2021 05:09 )             43.4     11-12    x   |  x   |  x   ----------------------------<  x   4.5   |  x   |  x     Ca    8.2<L>      12 Nov 2021 05:09  Phos  6.0     11-12  Mg     2.1     11-12    TPro  6.4  /  Alb  2.4<L>  /  TBili  0.5  /  DBili  x   /  AST  23  /  ALT  28  /  AlkPhos  85  11-12    PT/INR - ( 11 Nov 2021 04:45 )   PT: 13.1 sec;   INR: 1.14 ratio         PTT - ( 11 Nov 2021 04:45 )  PTT:32.8 sec

## 2021-11-12 NOTE — PROGRESS NOTE ADULT - ASSESSMENT
55 y/o male w/ pmhx of DM, HTN, CHF, b/l LE swelling, Obesity (BMI 49), h/o diverticulitis/diverticular abscess s/p left colostomy 07/2021 presenting for re-evaluation of colostomy/requesting reversal.     # abd pain due to colostomy and hx of diverticulitis   - cont w/ zosyn Q 8 hrs   POD#1  leukocytosis noted d/o surgery monitor for now  creatinine up dc ACEi  repeat labs in am    # hx of DM 2   - cont w/ lantus 35 and ISS       # chronic diastolic heart failure   stable    Was hypoxic at night suspect RAFFI; i d/c steroids- diabetic might have hard time healing after surgery  BIPAP at night

## 2021-11-12 NOTE — PROGRESS NOTE ADULT - PROBLEM SELECTOR PLAN 1
No coronary history of symptoms but multiple cardiac risk factors.    Somewhat poor historian w/ regard to cardiology followup.    He reports a stress test '20 - records requested & are pending.  ETT and Echo were reviewed. Normal EF and no evidence of ischemia. No cardiac contraindication for planned surgery.
no cardiac issues postop.    Will sign off please call if questions.
No coronary history of symptoms but multiple cardiac risk factors.    Somewhat poor historian w/ regard to cardiology followup.    He reports a stress test '20 - records requested & are pending.  Nonspecific ST-T changes across precordial leads, unclear if these are new  since stress in '20.  Stress test & echo completed today, awaiting results if normal may proceed w/ colonoscopy & surgery w/o further cardiac testing.

## 2021-11-12 NOTE — PROGRESS NOTE ADULT - ASSESSMENT
A/P:  57 yo M with multiple comorbidities h/o of DM, HTN, CHF, b/l LE swelling, Obesity(BMI 49), h/o smoking, possible RAFFI & h/o diverticulitis/diverticular abscess s/p left colostomy s/p reversal complicated with hypoxaemic respiratory failure with possible fluid overload & acute excerbation of COPD flare with h/o smoking.    Plan:     Decd bipap used qhs & prn-3lpm nc titrate fio2  Trial of iv solumedrols with h/o smoking & possible COPD  incentive scotty  sleep study as outpat  aerosols  nicotine patch  pain control  d/w staff  DVT prophylasix  GI/DVT prop

## 2021-11-12 NOTE — PROGRESS NOTE ADULT - SUBJECTIVE AND OBJECTIVE BOX
55 yo M with PMH of DM, HTN, CHF, b/l LE swelling, Obesity(BMI 49), h/o diverticulitis/diverticular abscess   s/p left colostomy 07/2021 at [facility] was seen in the ED for abdominal pain.   Pt reports he is suffering from this colostomy bag and wants it reversed.   Denies fever or chills, n/v/d/c. Pt reports the colostomy is functional.    PMH: as above  PSH: left colostomy 7/2021  Allergy: NKDA  SH: + smoking cig daily for last 20 yrs, denies etOH or illicit drug (08 Nov 2021 12:23)    Admitted for reversal of L sided colostomy.  Pulmonary medicine also consulted for clearance GI consulted for colonoscopy  to be done prior to surgery planned for wed.  Pt denies any cardiac symptoms: chest pain, dyspnea, palpitaitons, syncope.  somewhat poor historian - reports h/o stress test in past year.  Reports an evaluation Dr. TATY Peace at [facility] who ordered this.  Called his office & they state stress test Sep '20, echo & LM Mar '21   was completed, will fax me this.  Also reviewed allscripts & I see he was evaluated by Dr. Drummond  w/ arian & stress & echo were ordered but he cancelled these tests.  Dr. Drummond was not aware of tests I mentioned above.    11/9/'21: no complaints overnight.  11/11/'21: seen postop - no complaints       MEDICATIONS:  OUTPATIENT  Home Medications:  Admelog 100 units/mL injectable solution: Inject subcutaneously with meals per sliding scale as directed (08 Nov 2021 12:39)  ALPRAZolam 2 mg oral tablet: 1 tab(s) orally 2 times a day, As Needed (08 Nov 2021 12:39)  Aspirin Enteric Coated 81 mg oral delayed release tablet: 1 tab(s) orally once a day (08 Nov 2021 12:39)  atorvastatin 40 mg oral tablet: 1 tab(s) orally once a day (at bedtime) (08 Nov 2021 12:39)  carvedilol 12.5 mg oral tablet: 1 tab(s) orally 2 times a day (08 Nov 2021 12:39)  furosemide 40 mg oral tablet: 1 tab(s) orally once a day (08 Nov 2021 12:39)  lisinopril 20 mg oral tablet: 1 tab(s) orally once a day (08 Nov 2021 12:39)  metFORMIN 500 mg oral tablet: 1 tab(s) orally 2 times a day (08 Nov 2021 12:39)  Pfizer-BioNTech COVID-19 Vaccine PF 30 mcg/0.3 mL intramuscular suspension: 0.3 milliliter(s) intramuscular once  ***rec&#x27;d both doses*** (08 Nov 2021 12:39)  Semglee Prefilled Pen 100 units/mL subcutaneous solution: 50 unit(s) subcutaneously once a day (at bedtime) (08 Nov 2021 12:39)  tamsulosin 0.4 mg oral capsule: 1 cap(s) orally once a day (08 Nov 2021 12:39)      INPATIENT  MEDICATIONS  (STANDING):  carvedilol 12.5 milliGRAM(s) Oral every 12 hours  dextrose 40% Gel 15 Gram(s) Oral once  dextrose 5%. 1000 milliLiter(s) (50 mL/Hr) IV Continuous <Continuous>  dextrose 5%. 1000 milliLiter(s) (100 mL/Hr) IV Continuous <Continuous>  dextrose 50% Injectable 25 Gram(s) IV Push once  dextrose 50% Injectable 12.5 Gram(s) IV Push once  dextrose 50% Injectable 25 Gram(s) IV Push once  glucagon  Injectable 1 milliGRAM(s) IntraMuscular once  heparin   Injectable 5000 Unit(s) SubCutaneous every 8 hours  HYDROmorphone PCA (1 mG/mL) 30 milliLiter(s) PCA Continuous PCA Continuous  influenza   Vaccine 0.5 milliLiter(s) IntraMuscular once  insulin glargine Injectable (LANTUS) 35 Unit(s) SubCutaneous at bedtime  insulin lispro (ADMELOG) corrective regimen sliding scale   SubCutaneous three times a day before meals  lactated ringers. 1000 milliLiter(s) (125 mL/Hr) IV Continuous <Continuous>  nicotine -  14 mG/24Hr(s) Patch 1 patch Transdermal daily  pantoprazole  Injectable 40 milliGRAM(s) IV Push daily  piperacillin/tazobactam IVPB.. 3.375 Gram(s) IV Intermittent every 12 hours  tamsulosin 0.4 milliGRAM(s) Oral at bedtime    MEDICATIONS  (PRN):  acetaminophen     Tablet .. 650 milliGRAM(s) Oral every 6 hours PRN Mild Pain (1 - 3)  ALBUTerol    90 MICROgram(s) HFA Inhaler 2 Puff(s) Inhalation every 6 hours PRN Bronchospasm  HYDROmorphone PCA (1 mG/mL) Rescue Clinician Bolus 0.5 milliGRAM(s) IV Push every 15 minutes PRN for Pain Scale GREATER THAN 6  naloxone Injectable 0.1 milliGRAM(s) IV Push every 3 minutes PRN For ANY of the following changes in patient status:  A. RR LESS THAN 10 breaths per minute, B. Oxygen saturation LESS THAN 90%, C. Sedation score of 6  ondansetron Injectable 4 milliGRAM(s) IV Push every 6 hours PRN Nausea  ondansetron Injectable 4 milliGRAM(s) IV Push every 6 hours PRN Nausea and/or Vomiting  traZODone 50 milliGRAM(s) Oral at bedtime PRN sleep/anxiety          Vital Signs Last 24 Hrs  T(C): 36.6 (12 Nov 2021 16:45), Max: 36.6 (12 Nov 2021 16:45)  T(F): 97.9 (12 Nov 2021 16:45), Max: 97.9 (12 Nov 2021 16:45)  HR: 62 (12 Nov 2021 16:45) (57 - 90)  BP: 106/63 (12 Nov 2021 16:00) (91/59 - 160/83)  BP(mean): 73 (12 Nov 2021 16:00) (65 - 94)  RR: 18 (12 Nov 2021 16:45) (12 - 22)  SpO2: 94% (12 Nov 2021 16:45) (90% - 100%)Daily     Daily I&O's Summary    11 Nov 2021 07:01  -  12 Nov 2021 07:00  --------------------------------------------------------  IN: 1922 mL / OUT: 1300 mL / NET: 622 mL    12 Nov 2021 07:01  -  12 Nov 2021 18:45  --------------------------------------------------------  IN: 1228.2 mL / OUT: 525 mL / NET: 703.2 mL        I&O's Detail    11 Nov 2021 07:01  -  12 Nov 2021 07:00  --------------------------------------------------------  IN:    Lactated Ringers: 422 mL    Other (mL): 1500 mL  Total IN: 1922 mL    OUT:    Indwelling Catheter - Urethral (mL): 900 mL    Other (mL): 400 mL  Total OUT: 1300 mL    Total NET: 622 mL      12 Nov 2021 07:01  -  12 Nov 2021 18:45  --------------------------------------------------------  IN:    Lactated Ringers: 1228.2 mL  Total IN: 1228.2 mL    OUT:    Indwelling Catheter - Urethral (mL): 525 mL  Total OUT: 525 mL    Total NET: 703.2 mL          I&O's Summary    11 Nov 2021 07:01  -  12 Nov 2021 07:00  --------------------------------------------------------  IN: 1922 mL / OUT: 1300 mL / NET: 622 mL    12 Nov 2021 07:01  -  12 Nov 2021 18:45  --------------------------------------------------------  IN: 1228.2 mL / OUT: 525 mL / NET: 703.2 mL        PHYSICAL EXAM:    Constitutional: NAD, awake and alert, well-developed  HEENT: PERR, EOMI,  No oral cyananosis.  Neck:  supple,  No JVD  Respiratory: Breath sounds are clear bilaterally, No wheezing, rales or rhonchi  Cardiovascular: S1 and S2, regular rate and rhythm, no Murmurs, gallops or rubs  Gastrointestinal: colostomy  Extremities: No peripheral edema. No clubbing or cyanosis.  Vascular: 2+ peripheral pulses  Neurological: A/O x 3, no focal deficits  Musculoskeletal: no calf tenderness.  Skin: No rashes.      LABS: All Labs Reviewed:                        13.1   18.37 )-----------( 351      ( 12 Nov 2021 05:09 )             43.4                         13.5   8.37  )-----------( 389      ( 11 Nov 2021 04:45 )             43.2                         14.1   7.98  )-----------( 383      ( 10 Nov 2021 08:24 )             45.7     12 Nov 2021 12:17    x      |  x      |  x      ----------------------------<  x      4.5     |  x      |  x      12 Nov 2021 05:09    139    |  104    |  14     ----------------------------<  178    5.3     |  29     |  1.66   11 Nov 2021 04:45    137    |  103    |  9      ----------------------------<  149    4.0     |  29     |  1.14     Ca    8.2        12 Nov 2021 05:09  Ca    8.9        11 Nov 2021 04:45  Ca    9.1        10 Nov 2021 08:24  Phos  6.0       12 Nov 2021 12:17  Phos  6.5       12 Nov 2021 05:09  Phos  3.7       11 Nov 2021 04:45  Mg     2.1       12 Nov 2021 05:09  Mg     2.1       11 Nov 2021 04:45  Mg     2.2       10 Nov 2021 08:24    TPro  6.4    /  Alb  2.4    /  TBili  0.5    /  DBili  x      /  AST  23     /  ALT  28     /  AlkPhos  85     12 Nov 2021 05:09  TPro  7.4    /  Alb  2.9    /  TBili  0.5    /  DBili  x      /  AST  27     /  ALT  33     /  AlkPhos  106    11 Nov 2021 04:45  TPro  7.5    /  Alb  2.9    /  TBili  0.5    /  DBili  x      /  AST  26     /  ALT  34     /  AlkPhos  116    10 Nov 2021 08:24    PT/INR - ( 11 Nov 2021 04:45 )   PT: 13.1 sec;   INR: 1.14 ratio         PTT - ( 11 Nov 2021 04:45 )  PTT:32.8 sec      Blood Culture: Organism --  Gram Stain Blood -- Gram Stain --  Specimen Source .Blood None  Culture-Blood --    Organism --  Gram Stain Blood -- Gram Stain --  Specimen Source Clean Catch Clean Catch (Midstream)  Culture-Blood --        ECHO/CARDIAC CATHTERIZATION/STRESS TEST:< from: TTE Echo Complete w/o Contrast w/ Doppler (11.09.21 @ 13:54) >   Impression     Summary     Mild concentric left ventricular hypertrophy is present.   Estimated left ventricular ejection fraction is 55-60 %.   Fibrocalcific changes noted to the Aortic valve leaflets with preserved   leaflet excursion.   The ascending aorta appears to be at the upper limits of normal in size.   Moderate mitral annular calcification is present.   Fibrocalcific changes noted to the mitral valve leaflets with preserved   leaflet excursion.   The tricuspid valve leaflets are thin and pliable; valve motion is normal.   Trace tricuspid valve regurgitation is present.     Signature     ----------------------------------------------------------------   Electronically signed by Broderick Gotti MD(Interpreting   physician) on 11/09/2021 10:08 PM   ----------------------------------------------------------------    < end of copied text >    < from: NM Nuclear Stress Pharmacologic Multiple (11.10.21 @ 09:25) >  IMPRESSION: SPECT Myocardial PerfusionImaging demonstrates:    Mildly enlarged/dilated left ventricle.    No scan evidence of reversible or fixed perfusion defects.    Normal left ventricular contractility with an ejection fraction of 56% (Normal: 50% or greater).    No regional wall motion abnormalities.    Please refer to cardiac stress test report for dosage of Regadenoson administered, EKG findings and symptoms during the procedure.    --- End of Report ---    < end of copied text >    ==============================    Artur Powers M.D.  Cardiology, [facility]  Cell: [phone]  Offices:   [phone] ([facility] Office)  [phone] ([facility] Office)

## 2021-11-12 NOTE — PHYSICAL THERAPY INITIAL EVALUATION ADULT - DID THE PATIENT HAVE SURGERY?
Pt s/p open resection sigmoid colon, mobilization pf splenic flexure, open loop ileostomy reanastamosis, colon, with reversal of Kamilla's pouch. Repair of colovesical fistual, evacuation of pelvic abscess. S/P ureteral stent placement./yes Pt s/p open resection sigmoid colon, mobilization pf splenic flexure, open loop ileostomy reanastamosis, colon, with reversal of Kamlila's pouch. Repair of colovesical fistula, evacuation of pelvic abscess. S/P ureteral stent placement./yes Pt s/p open resection sigmoid colon, mobilization of splenic flexure, open loop ileostomy reanastamosis, colon, with reversal of Kamilla's pouch. Repair of colovesical fistula, evacuation of pelvic abscess. S/P ureteral stent placement./yes

## 2021-11-13 LAB
ANION GAP SERPL CALC-SCNC: 5 MMOL/L — SIGNIFICANT CHANGE UP (ref 5–17)
BUN SERPL-MCNC: 21 MG/DL — SIGNIFICANT CHANGE UP (ref 7–23)
CALCIUM SERPL-MCNC: 8.5 MG/DL — SIGNIFICANT CHANGE UP (ref 8.5–10.1)
CHLORIDE SERPL-SCNC: 102 MMOL/L — SIGNIFICANT CHANGE UP (ref 96–108)
CO2 SERPL-SCNC: 28 MMOL/L — SIGNIFICANT CHANGE UP (ref 22–31)
CREAT SERPL-MCNC: 1.37 MG/DL — HIGH (ref 0.5–1.3)
CULTURE RESULTS: SIGNIFICANT CHANGE UP
CULTURE RESULTS: SIGNIFICANT CHANGE UP
GLUCOSE SERPL-MCNC: 116 MG/DL — HIGH (ref 70–99)
HCT VFR BLD CALC: 40.5 % — SIGNIFICANT CHANGE UP (ref 39–50)
HGB BLD-MCNC: 12.1 G/DL — LOW (ref 13–17)
MAGNESIUM SERPL-MCNC: 2.3 MG/DL — SIGNIFICANT CHANGE UP (ref 1.6–2.6)
MCHC RBC-ENTMCNC: 27.1 PG — SIGNIFICANT CHANGE UP (ref 27–34)
MCHC RBC-ENTMCNC: 29.9 GM/DL — LOW (ref 32–36)
MCV RBC AUTO: 90.8 FL — SIGNIFICANT CHANGE UP (ref 80–100)
PHOSPHATE SERPL-MCNC: 3.8 MG/DL — SIGNIFICANT CHANGE UP (ref 2.5–4.5)
PLATELET # BLD AUTO: 310 K/UL — SIGNIFICANT CHANGE UP (ref 150–400)
POTASSIUM SERPL-MCNC: 4.8 MMOL/L — SIGNIFICANT CHANGE UP (ref 3.5–5.3)
POTASSIUM SERPL-SCNC: 4.8 MMOL/L — SIGNIFICANT CHANGE UP (ref 3.5–5.3)
RBC # BLD: 4.46 M/UL — SIGNIFICANT CHANGE UP (ref 4.2–5.8)
RBC # FLD: 14.5 % — SIGNIFICANT CHANGE UP (ref 10.3–14.5)
SODIUM SERPL-SCNC: 135 MMOL/L — SIGNIFICANT CHANGE UP (ref 135–145)
SPECIMEN SOURCE: SIGNIFICANT CHANGE UP
SPECIMEN SOURCE: SIGNIFICANT CHANGE UP
WBC # BLD: 15.51 K/UL — HIGH (ref 3.8–10.5)
WBC # FLD AUTO: 15.51 K/UL — HIGH (ref 3.8–10.5)

## 2021-11-13 PROCEDURE — 99233 SBSQ HOSP IP/OBS HIGH 50: CPT

## 2021-11-13 RX ORDER — HYDROMORPHONE HYDROCHLORIDE 2 MG/ML
1 INJECTION INTRAMUSCULAR; INTRAVENOUS; SUBCUTANEOUS EVERY 4 HOURS
Refills: 0 | Status: ACTIVE | OUTPATIENT
Start: 2021-11-13 | End: 2021-11-20

## 2021-11-13 RX ORDER — OXYCODONE HYDROCHLORIDE 5 MG/1
10 TABLET ORAL EVERY 6 HOURS
Refills: 0 | Status: ACTIVE | OUTPATIENT
Start: 2021-11-13 | End: 2021-11-20

## 2021-11-13 RX ORDER — OXYCODONE HYDROCHLORIDE 5 MG/1
5 TABLET ORAL EVERY 6 HOURS
Refills: 0 | Status: ACTIVE | OUTPATIENT
Start: 2021-11-13 | End: 2021-11-20

## 2021-11-13 RX ORDER — MIDODRINE HYDROCHLORIDE 2.5 MG/1
10 TABLET ORAL THREE TIMES A DAY
Refills: 0 | Status: ACTIVE | OUTPATIENT
Start: 2021-11-13 | End: 2022-10-12

## 2021-11-13 RX ORDER — SODIUM CHLORIDE 9 MG/ML
1000 INJECTION INTRAMUSCULAR; INTRAVENOUS; SUBCUTANEOUS
Refills: 0 | Status: DISCONTINUED | OUTPATIENT
Start: 2021-11-13 | End: 2021-11-14

## 2021-11-13 RX ORDER — ACETAMINOPHEN 500 MG
650 TABLET ORAL EVERY 6 HOURS
Refills: 0 | Status: ACTIVE | OUTPATIENT
Start: 2021-11-13 | End: 2022-10-12

## 2021-11-13 RX ORDER — SODIUM CHLORIDE 9 MG/ML
1000 INJECTION, SOLUTION INTRAVENOUS
Refills: 0 | Status: DISCONTINUED | OUTPATIENT
Start: 2021-11-13 | End: 2021-11-13

## 2021-11-13 RX ADMIN — HYDROMORPHONE HYDROCHLORIDE 1 MILLIGRAM(S): 2 INJECTION INTRAMUSCULAR; INTRAVENOUS; SUBCUTANEOUS at 21:44

## 2021-11-13 RX ADMIN — OXYCODONE HYDROCHLORIDE 10 MILLIGRAM(S): 5 TABLET ORAL at 12:36

## 2021-11-13 RX ADMIN — SODIUM CHLORIDE 50 MILLILITER(S): 9 INJECTION INTRAMUSCULAR; INTRAVENOUS; SUBCUTANEOUS at 14:05

## 2021-11-13 RX ADMIN — INSULIN GLARGINE 35 UNIT(S): 100 INJECTION, SOLUTION SUBCUTANEOUS at 21:59

## 2021-11-13 RX ADMIN — MIDODRINE HYDROCHLORIDE 10 MILLIGRAM(S): 2.5 TABLET ORAL at 17:21

## 2021-11-13 RX ADMIN — CARVEDILOL PHOSPHATE 12.5 MILLIGRAM(S): 80 CAPSULE, EXTENDED RELEASE ORAL at 21:57

## 2021-11-13 RX ADMIN — HYDROMORPHONE HYDROCHLORIDE 1 MILLIGRAM(S): 2 INJECTION INTRAMUSCULAR; INTRAVENOUS; SUBCUTANEOUS at 22:14

## 2021-11-13 RX ADMIN — SODIUM CHLORIDE 50 MILLILITER(S): 9 INJECTION, SOLUTION INTRAVENOUS at 12:01

## 2021-11-13 RX ADMIN — PIPERACILLIN AND TAZOBACTAM 25 GRAM(S): 4; .5 INJECTION, POWDER, LYOPHILIZED, FOR SOLUTION INTRAVENOUS at 09:40

## 2021-11-13 RX ADMIN — PANTOPRAZOLE SODIUM 40 MILLIGRAM(S): 20 TABLET, DELAYED RELEASE ORAL at 09:40

## 2021-11-13 RX ADMIN — HYDROMORPHONE HYDROCHLORIDE 1 MILLIGRAM(S): 2 INJECTION INTRAMUSCULAR; INTRAVENOUS; SUBCUTANEOUS at 15:23

## 2021-11-13 RX ADMIN — SODIUM CHLORIDE 125 MILLILITER(S): 9 INJECTION, SOLUTION INTRAVENOUS at 10:44

## 2021-11-13 RX ADMIN — HYDROMORPHONE HYDROCHLORIDE 1 MILLIGRAM(S): 2 INJECTION INTRAMUSCULAR; INTRAVENOUS; SUBCUTANEOUS at 16:00

## 2021-11-13 RX ADMIN — PIPERACILLIN AND TAZOBACTAM 25 GRAM(S): 4; .5 INJECTION, POWDER, LYOPHILIZED, FOR SOLUTION INTRAVENOUS at 21:58

## 2021-11-13 RX ADMIN — MIDODRINE HYDROCHLORIDE 10 MILLIGRAM(S): 2.5 TABLET ORAL at 12:07

## 2021-11-13 RX ADMIN — HEPARIN SODIUM 5000 UNIT(S): 5000 INJECTION INTRAVENOUS; SUBCUTANEOUS at 13:15

## 2021-11-13 RX ADMIN — Medication 2: at 12:07

## 2021-11-13 RX ADMIN — HEPARIN SODIUM 5000 UNIT(S): 5000 INJECTION INTRAVENOUS; SUBCUTANEOUS at 21:57

## 2021-11-13 RX ADMIN — TAMSULOSIN HYDROCHLORIDE 0.4 MILLIGRAM(S): 0.4 CAPSULE ORAL at 21:57

## 2021-11-13 RX ADMIN — HEPARIN SODIUM 5000 UNIT(S): 5000 INJECTION INTRAVENOUS; SUBCUTANEOUS at 06:04

## 2021-11-13 RX ADMIN — OXYCODONE HYDROCHLORIDE 10 MILLIGRAM(S): 5 TABLET ORAL at 13:30

## 2021-11-13 NOTE — PROGRESS NOTE ADULT - SUBJECTIVE AND OBJECTIVE BOX
Subjective:  no distress  s/p colostomy reversal    MEDICATIONS  (STANDING):  carvedilol 12.5 milliGRAM(s) Oral every 12 hours  dextrose 40% Gel 15 Gram(s) Oral once  dextrose 5%. 1000 milliLiter(s) (50 mL/Hr) IV Continuous <Continuous>  dextrose 5%. 1000 milliLiter(s) (100 mL/Hr) IV Continuous <Continuous>  dextrose 50% Injectable 25 Gram(s) IV Push once  dextrose 50% Injectable 12.5 Gram(s) IV Push once  dextrose 50% Injectable 25 Gram(s) IV Push once  glucagon  Injectable 1 milliGRAM(s) IntraMuscular once  heparin   Injectable 5000 Unit(s) SubCutaneous every 8 hours  HYDROmorphone PCA (1 mG/mL) 30 milliLiter(s) PCA Continuous PCA Continuous  influenza   Vaccine 0.5 milliLiter(s) IntraMuscular once  insulin glargine Injectable (LANTUS) 35 Unit(s) SubCutaneous at bedtime  insulin lispro (ADMELOG) corrective regimen sliding scale   SubCutaneous three times a day before meals  lactated ringers. 1000 milliLiter(s) (125 mL/Hr) IV Continuous <Continuous>  nicotine -  14 mG/24Hr(s) Patch 1 patch Transdermal daily  pantoprazole  Injectable 40 milliGRAM(s) IV Push daily  piperacillin/tazobactam IVPB.. 3.375 Gram(s) IV Intermittent every 12 hours  tamsulosin 0.4 milliGRAM(s) Oral at bedtime    MEDICATIONS  (PRN):  acetaminophen     Tablet .. 650 milliGRAM(s) Oral every 6 hours PRN Mild Pain (1 - 3)  ALBUTerol    90 MICROgram(s) HFA Inhaler 2 Puff(s) Inhalation every 6 hours PRN Bronchospasm  HYDROmorphone PCA (1 mG/mL) Rescue Clinician Bolus 0.5 milliGRAM(s) IV Push every 15 minutes PRN for Pain Scale GREATER THAN 6  naloxone Injectable 0.1 milliGRAM(s) IV Push every 3 minutes PRN For ANY of the following changes in patient status:  A. RR LESS THAN 10 breaths per minute, B. Oxygen saturation LESS THAN 90%, C. Sedation score of 6  ondansetron Injectable 4 milliGRAM(s) IV Push every 6 hours PRN Nausea  ondansetron Injectable 4 milliGRAM(s) IV Push every 6 hours PRN Nausea and/or Vomiting  traZODone 50 milliGRAM(s) Oral at bedtime PRN sleep/anxiety      Allergies    No Known Allergies    Intolerances        REVIEW OF SYSTEMS:    CONSTITUTIONAL:  As per HPI.  HEENT:  Eyes:  No diplopia or blurred vision. ENT:  No earache, sore throat or runny nose.  CARDIOVASCULAR:  No pressure, squeezing, tightness, heaviness or aching about the chest, neck, axilla or epigastrium.  RESPIRATORY:  No cough, shortness of breath, PND or orthopnea.  GASTROINTESTINAL:  No nausea, vomiting or diarrhea.  GENITOURINARY:  No dysuria, frequency or urgency.  MUSCULOSKELETAL:  no joint pain, deformity, tenderness  EXTREMITIES: no clubbing cyanosis,edema  SKIN:  No change in skin, hair or nails.  NEUROLOGIC:  No paresthesias, fasciculations, seizures or weakness.  PSYCHIATRIC:  No disorder of thought or mood.  ENDOCRINE:  No heat or cold intolerance, polyuria or polydipsia.  HEMATOLOGICAL:  No easy bruising or bleedings:    Vital Signs Last 24 Hrs  T(C): 36.6 (12 Nov 2021 16:45), Max: 36.6 (12 Nov 2021 16:45)  T(F): 97.9 (12 Nov 2021 16:45), Max: 97.9 (12 Nov 2021 16:45)  HR: 70 (13 Nov 2021 08:00) (57 - 74)  BP: 93/56 (13 Nov 2021 08:00) (93/56 - 131/77)  BP(mean): 64 (13 Nov 2021 08:00) (64 - 85)  RR: 18 (13 Nov 2021 08:00) (13 - 21)  SpO2: 96% (13 Nov 2021 08:00) (90% - 96%) on NC 2L    PHYSICAL EXAMINATION:  SKIN: no rashes  HEAD: NC/AT  EYES: PERRLA, EOMI  EARS: TM's intact  NOSE: no abnormalities  NECK:  Supple. No lymphadenopathy. Jugular venous pressure not elevated. Carotids equal.   HEART:   S1S2 reg  CHEST:  bilat ronchi  ABDOMEN:  Soft and nontender. dressing in place  EXTREMITIES:  no C/C/E  NEURO: AAO x 3, no focal deficts       LABS:                        12.1   15.51 )-----------( 310      ( 13 Nov 2021 07:10 )             40.5     11-13    135  |  102  |  21  ----------------------------<  116<H>  4.8   |  28  |  1.37<H>    Ca    8.5      13 Nov 2021 07:10  Phos  3.8     11-13  Mg     2.3     11-13    TPro  6.4  /  Alb  2.4<L>  /  TBili  0.5  /  DBili  x   /  AST  23  /  ALT  28  /  AlkPhos  85  11-12          RADIOLOGY & ADDITIONAL TESTS:

## 2021-11-13 NOTE — PROGRESS NOTE ADULT - ASSESSMENT
55 y/o male w/ pmhx of DM, HTN, CHF, b/l LE swelling, Obesity (BMI 49), h/o diverticulitis/diverticular abscess s/p left colostomy 07/2021 presenting for re-evaluation of colostomy/requesting reversal.     # abd pain due to colostomy and hx of diverticulitis   - cont w/ zosyn Q 8 hrs   POD#2  leukocytosis is improving  he also had stents removed changed  creatinine up dc ACEi  repeat labs in am    # hx of DM 2   - cont w/ lantus 35 and ISS       # chronic diastolic heart failure   stable  bp meds held borderline BP  DC PCA  start midodrine    Was hypoxic at night suspect RAFFI; i d/c steroids- diabetic might have hard time healing after surgery  BIPAP at night

## 2021-11-13 NOTE — PROGRESS NOTE ADULT - SUBJECTIVE AND OBJECTIVE BOX
SURGERY DAILY PROGRESS NOTE:     Subjective:  Patient seen and examined this AM at bedside. No acute events overnight and patient resting comfortably. Denies fever/chills, shortness of breath, chest pain. VS reviewed.  Was on BiPAP this morning.    Objective:  MEDICATIONS  (STANDING):  carvedilol 12.5 milliGRAM(s) Oral every 12 hours  dextrose 40% Gel 15 Gram(s) Oral once  dextrose 5%. 1000 milliLiter(s) (50 mL/Hr) IV Continuous <Continuous>  dextrose 5%. 1000 milliLiter(s) (100 mL/Hr) IV Continuous <Continuous>  dextrose 50% Injectable 25 Gram(s) IV Push once  dextrose 50% Injectable 12.5 Gram(s) IV Push once  dextrose 50% Injectable 25 Gram(s) IV Push once  glucagon  Injectable 1 milliGRAM(s) IntraMuscular once  heparin   Injectable 5000 Unit(s) SubCutaneous every 8 hours  HYDROmorphone PCA (1 mG/mL) 30 milliLiter(s) PCA Continuous PCA Continuous  influenza   Vaccine 0.5 milliLiter(s) IntraMuscular once  insulin glargine Injectable (LANTUS) 35 Unit(s) SubCutaneous at bedtime  insulin lispro (ADMELOG) corrective regimen sliding scale   SubCutaneous three times a day before meals  lactated ringers. 1000 milliLiter(s) (125 mL/Hr) IV Continuous <Continuous>  nicotine -  14 mG/24Hr(s) Patch 1 patch Transdermal daily  pantoprazole  Injectable 40 milliGRAM(s) IV Push daily  piperacillin/tazobactam IVPB.. 3.375 Gram(s) IV Intermittent every 12 hours  tamsulosin 0.4 milliGRAM(s) Oral at bedtime    MEDICATIONS  (PRN):  acetaminophen     Tablet .. 650 milliGRAM(s) Oral every 6 hours PRN Mild Pain (1 - 3)  ALBUTerol    90 MICROgram(s) HFA Inhaler 2 Puff(s) Inhalation every 6 hours PRN Bronchospasm  HYDROmorphone PCA (1 mG/mL) Rescue Clinician Bolus 0.5 milliGRAM(s) IV Push every 15 minutes PRN for Pain Scale GREATER THAN 6  naloxone Injectable 0.1 milliGRAM(s) IV Push every 3 minutes PRN For ANY of the following changes in patient status:  A. RR LESS THAN 10 breaths per minute, B. Oxygen saturation LESS THAN 90%, C. Sedation score of 6  ondansetron Injectable 4 milliGRAM(s) IV Push every 6 hours PRN Nausea  ondansetron Injectable 4 milliGRAM(s) IV Push every 6 hours PRN Nausea and/or Vomiting  traZODone 50 milliGRAM(s) Oral at bedtime PRN sleep/anxiety    Vital Signs Last 24 Hrs  T(C): 36.3 (13 Nov 2021 09:40), Max: 36.6 (12 Nov 2021 16:45)  T(F): 97.4 (13 Nov 2021 09:40), Max: 97.9 (12 Nov 2021 16:45)  HR: 72 (13 Nov 2021 09:39) (57 - 74)  BP: 92/52 (13 Nov 2021 09:39) (92/52 - 131/77)  BP(mean): 62 (13 Nov 2021 09:39) (62 - 85)  RR: 19 (13 Nov 2021 09:39) (13 - 21)  SpO2: 95% (13 Nov 2021 09:39) (90% - 96%)    PHYSICAL EXAM   GENERAL: NAD, AOx3, well developed, morbidly obese  HEAD: Atraumatic, normocephalic  EYES: EOMI, PERRLA, conjunctiva and sclera clear  ENT: moist mucous membrane  NECK: supple, No JVD, midline trachea  CHEST/LUNG: unlabored respirations to room air  Heart: S1, S2 normal  ABDOMEN: soft, obese, non-tender, right side ileostomy in place with sweat, midline dressing mildly soiled, changed at bedside this am  : frank in place with blood tinged urine, x2 blue stent coming out of frank  EXTREMITIES: b/l pitting edema  NERVOUS SYSTEM: AOx3, speech clear, no neuro-deficits  MSK: full ROM, no deformities    I&O's Detail    12 Nov 2021 07:01  -  13 Nov 2021 07:00  --------------------------------------------------------  IN:    Lactated Ringers: 1228.2 mL  Total IN: 1228.2 mL    OUT:    Ileostomy (mL): 50 mL    Indwelling Catheter - Urethral (mL): 1075 mL  Total OUT: 1125 mL    Total NET: 103.2 mL    LABS:                        12.1   15.51 )-----------( 310      ( 13 Nov 2021 07:10 )             40.5     13 Nov 2021 07:10    135    |  102    |  21     ----------------------------<  116    4.8     |  28     |  1.37     Ca    8.5        13 Nov 2021 07:10  Phos  3.8       13 Nov 2021 07:10  Mg     2.3       13 Nov 2021 07:10             SURGERY DAILY PROGRESS NOTE:     Subjective:  Patient seen and examined this AM at bedside. No acute events overnight and patient resting comfortably. Denies fever/chills, shortness of breath, chest pain. VS reviewed.  Was on BiPAP this morning.    Objective:  MEDICATIONS  (STANDING):  carvedilol 12.5 milliGRAM(s) Oral every 12 hours  dextrose 40% Gel 15 Gram(s) Oral once  dextrose 5%. 1000 milliLiter(s) (50 mL/Hr) IV Continuous <Continuous>  dextrose 5%. 1000 milliLiter(s) (100 mL/Hr) IV Continuous <Continuous>  dextrose 50% Injectable 25 Gram(s) IV Push once  dextrose 50% Injectable 12.5 Gram(s) IV Push once  dextrose 50% Injectable 25 Gram(s) IV Push once  glucagon  Injectable 1 milliGRAM(s) IntraMuscular once  heparin   Injectable 5000 Unit(s) SubCutaneous every 8 hours  HYDROmorphone PCA (1 mG/mL) 30 milliLiter(s) PCA Continuous PCA Continuous  influenza   Vaccine 0.5 milliLiter(s) IntraMuscular once  insulin glargine Injectable (LANTUS) 35 Unit(s) SubCutaneous at bedtime  insulin lispro (ADMELOG) corrective regimen sliding scale   SubCutaneous three times a day before meals  lactated ringers. 1000 milliLiter(s) (125 mL/Hr) IV Continuous <Continuous>  nicotine -  14 mG/24Hr(s) Patch 1 patch Transdermal daily  pantoprazole  Injectable 40 milliGRAM(s) IV Push daily  piperacillin/tazobactam IVPB.. 3.375 Gram(s) IV Intermittent every 12 hours  tamsulosin 0.4 milliGRAM(s) Oral at bedtime    MEDICATIONS  (PRN):  acetaminophen     Tablet .. 650 milliGRAM(s) Oral every 6 hours PRN Mild Pain (1 - 3)  ALBUTerol    90 MICROgram(s) HFA Inhaler 2 Puff(s) Inhalation every 6 hours PRN Bronchospasm  HYDROmorphone PCA (1 mG/mL) Rescue Clinician Bolus 0.5 milliGRAM(s) IV Push every 15 minutes PRN for Pain Scale GREATER THAN 6  naloxone Injectable 0.1 milliGRAM(s) IV Push every 3 minutes PRN For ANY of the following changes in patient status:  A. RR LESS THAN 10 breaths per minute, B. Oxygen saturation LESS THAN 90%, C. Sedation score of 6  ondansetron Injectable 4 milliGRAM(s) IV Push every 6 hours PRN Nausea  ondansetron Injectable 4 milliGRAM(s) IV Push every 6 hours PRN Nausea and/or Vomiting  traZODone 50 milliGRAM(s) Oral at bedtime PRN sleep/anxiety    Vital Signs Last 24 Hrs  T(C): 36.3 (13 Nov 2021 09:40), Max: 36.6 (12 Nov 2021 16:45)  T(F): 97.4 (13 Nov 2021 09:40), Max: 97.9 (12 Nov 2021 16:45)  HR: 72 (13 Nov 2021 09:39) (57 - 74)  BP: 92/52 (13 Nov 2021 09:39) (92/52 - 131/77)  BP(mean): 62 (13 Nov 2021 09:39) (62 - 85)  RR: 19 (13 Nov 2021 09:39) (13 - 21)  SpO2: 95% (13 Nov 2021 09:39) (90% - 96%)    PHYSICAL EXAM   GENERAL: NAD, AOx3, well developed, morbidly obese  HEAD: Atraumatic, normocephalic  ENT: moist mucous membrane  NECK: supple, No JVD, midline trachea  CHEST/LUNG: unlabored respirations to room air  Heart: RRR  ABDOMEN: soft, obese, non-tender, right side ileostomy in place with some stool output, midline dressing mildly soiled, changed at bedside this am  : frank in place   EXTREMITIES: b/l pitting edema  NERVOUS SYSTEM: AOx3, speech clear, no neuro-deficits  MSK: full ROM, no deformities    I&O's Detail    12 Nov 2021 07:01  -  13 Nov 2021 07:00  --------------------------------------------------------  IN:    Lactated Ringers: 1228.2 mL  Total IN: 1228.2 mL    OUT:    Ileostomy (mL): 50 mL    Indwelling Catheter - Urethral (mL): 1075 mL  Total OUT: 1125 mL    Total NET: 103.2 mL    LABS:                        12.1   15.51 )-----------( 310      ( 13 Nov 2021 07:10 )             40.5     13 Nov 2021 07:10    135    |  102    |  21     ----------------------------<  116    4.8     |  28     |  1.37     Ca    8.5        13 Nov 2021 07:10  Phos  3.8       13 Nov 2021 07:10  Mg     2.3       13 Nov 2021 07:10

## 2021-11-13 NOTE — PHYSICAL THERAPY INITIAL EVALUATION ADULT - GAIT DISTANCE, PT EVAL
[facility] amb d/t O2 at 4LPM-anticipate need for port. O2 for amb (not avail at time), pt wanting to stand x few min/bed to chair

## 2021-11-13 NOTE — PROGRESS NOTE ADULT - ASSESSMENT
55yo M with multiple comorbidities, including poorly controlled diabetes and morbid obesity, with diverticulitis s/p left side colostomy here for sigmoidectomy and colostomy reversal  s/p stress test  normal.  11/11--> s/p ex-lap, open resection, colostomy takedown/reversal, reanastomosis, creation of diverting loop ileostomy, repair of colovesical fistula, evacuation of pelvic abscess.    Plan:  pain control prn  monitor VS   FLD today  GI recs appreciated  DC frank tomorrow  stents out  IV abx for total of 2/3 days  endocrine recs appreciated for uncontrolled DM, last A1C 11.4. Glc better controlled  cardiology, medicine recs appreciated   appreciate pulmonology consult for postop desaturation, currently off BiPAP, suspecting undiagnosed RAFFI  continue home meds  ambulation  GI/DVT prop    Plan discussed with colorectal surgery team and attending, Dr. Garcia

## 2021-11-13 NOTE — PHYSICAL THERAPY INITIAL EVALUATION ADULT - PERTINENT HX OF CURRENT PROBLEM, REHAB EVAL
Pt admitted to  secondary to abd pain. Pt s/p left colostomy at [facility] 7/2021. Pt stating he is suffering from this colostomy. Pt with a hx of diverticulitis. COVID 19: neg.
see IE doc. noted 11/12. eval deferred yesterday d/t low BP, started on midodrine. BP at this time 102/63. pt off BiPap

## 2021-11-13 NOTE — PROGRESS NOTE ADULT - SUBJECTIVE AND OBJECTIVE BOX
55 yo M with PMH of DM, HTN, CHF, b/l LE swelling, Obesity(BMI 49), h/o diverticulitis/diverticular abscess s/p left colostomy 07/2021 at [facility] was seen in the ED for abdominal pain. Pt reports he is suffering from this colostomy bag and wants it reversed. Denies fever or chills, n/v/d/c. Pt reports the colostomy is functional.    POD#2 Open resection, sigmoid colon  Mobilization of splenic flexure   Open loop ileostomy   Reanastomosis, colon, with reversal of Kamilla pouch   Repair of colovesical fistula   Evacuation of pelvic abscess   Cystoscopy with insertion of ureteral stent in adult  bilateral, external     Vital Signs Last 24 Hrs  T(C): 36.3 (13 Nov 2021 09:40), Max: 36.6 (12 Nov 2021 16:45)  T(F): 97.4 (13 Nov 2021 09:40), Max: 97.9 (12 Nov 2021 16:45)  HR: 72 (13 Nov 2021 12:00) (59 - 72)  BP: 95/50 (13 Nov 2021 12:00) (92/52 - 112/65)  BP(mean): 62 (13 Nov 2021 12:00) (62 - 81)  RR: 17 (13 Nov 2021 12:00) (13 - 21)  SpO2: 93% (13 Nov 2021 12:00) (93% - 96%)    Constitutional: NAD, awake and alert, obese   HEENT: PERR, EOMI, Normal Hearing, MMM  Neck: Soft and supple, No LAD, No JVD  Respiratory: Breath sounds are clear bilaterally, No wheezing, rales or rhonchi  Cardiovascular: S1 and S2, regular rate and rhythm, no Murmurs, gallops or rubs  Gastrointestinal: Bowel Sounds present, soft, nontender, nondistended, no guarding, no rebound. + ostomy right side ileostomy in place with sweat  Extremities: No peripheral edema  Vascular: 2+ peripheral pulses  Neurological: A/O x 3, no focal deficits  Musculoskeletal: 5/5 strength b/l upper and lower extremities  Skin: No rashes    11-13    135  |  102  |  21  ----------------------------<  116<H>  4.8   |  28  |  1.37<H>    Ca    8.5      13 Nov 2021 07:10  Phos  3.8     11-13  Mg     2.3     11-13    TPro  6.4  /  Alb  2.4<L>  /  TBili  0.5  /  DBili  x   /  AST  23  /  ALT  28  /  AlkPhos  85  11-12                                     12.1   15.51 )-----------( 310      ( 13 Nov 2021 07:10 )             40.5     MEDICATIONS  (STANDING):  carvedilol 12.5 milliGRAM(s) Oral every 12 hours  heparin   Injectable 5000 Unit(s) SubCutaneous every 8 hours  influenza   Vaccine 0.5 milliLiter(s) IntraMuscular once  insulin glargine Injectable (LANTUS) 35 Unit(s) SubCutaneous at bedtime  insulin lispro (ADMELOG) corrective regimen sliding scale   SubCutaneous three times a day before meals  lactated ringers. 1000 milliLiter(s) (50 mL/Hr) IV Continuous <Continuous>  midodrine. 10 milliGRAM(s) Oral three times a day  nicotine -  14 mG/24Hr(s) Patch 1 patch Transdermal daily  pantoprazole  Injectable 40 milliGRAM(s) IV Push daily  piperacillin/tazobactam IVPB.. 3.375 Gram(s) IV Intermittent every 12 hours  tamsulosin 0.4 milliGRAM(s) Oral at bedtime

## 2021-11-14 LAB
ANION GAP SERPL CALC-SCNC: 4 MMOL/L — LOW (ref 5–17)
BUN SERPL-MCNC: 18 MG/DL — SIGNIFICANT CHANGE UP (ref 7–23)
CALCIUM SERPL-MCNC: 8.7 MG/DL — SIGNIFICANT CHANGE UP (ref 8.5–10.1)
CHLORIDE SERPL-SCNC: 105 MMOL/L — SIGNIFICANT CHANGE UP (ref 96–108)
CO2 SERPL-SCNC: 29 MMOL/L — SIGNIFICANT CHANGE UP (ref 22–31)
CREAT SERPL-MCNC: 1.09 MG/DL — SIGNIFICANT CHANGE UP (ref 0.5–1.3)
GLUCOSE SERPL-MCNC: 137 MG/DL — HIGH (ref 70–99)
HCT VFR BLD CALC: 41.2 % — SIGNIFICANT CHANGE UP (ref 39–50)
HGB BLD-MCNC: 12.1 G/DL — LOW (ref 13–17)
MAGNESIUM SERPL-MCNC: 2.3 MG/DL — SIGNIFICANT CHANGE UP (ref 1.6–2.6)
MCHC RBC-ENTMCNC: 26.4 PG — LOW (ref 27–34)
MCHC RBC-ENTMCNC: 29.4 GM/DL — LOW (ref 32–36)
MCV RBC AUTO: 89.8 FL — SIGNIFICANT CHANGE UP (ref 80–100)
PHOSPHATE SERPL-MCNC: 1.9 MG/DL — LOW (ref 2.5–4.5)
PLATELET # BLD AUTO: 342 K/UL — SIGNIFICANT CHANGE UP (ref 150–400)
POTASSIUM SERPL-MCNC: 4.5 MMOL/L — SIGNIFICANT CHANGE UP (ref 3.5–5.3)
POTASSIUM SERPL-SCNC: 4.5 MMOL/L — SIGNIFICANT CHANGE UP (ref 3.5–5.3)
RBC # BLD: 4.59 M/UL — SIGNIFICANT CHANGE UP (ref 4.2–5.8)
RBC # FLD: 14.5 % — SIGNIFICANT CHANGE UP (ref 10.3–14.5)
SODIUM SERPL-SCNC: 138 MMOL/L — SIGNIFICANT CHANGE UP (ref 135–145)
WBC # BLD: 13.07 K/UL — HIGH (ref 3.8–10.5)
WBC # FLD AUTO: 13.07 K/UL — HIGH (ref 3.8–10.5)

## 2021-11-14 PROCEDURE — 99232 SBSQ HOSP IP/OBS MODERATE 35: CPT

## 2021-11-14 RX ORDER — PANTOPRAZOLE SODIUM 20 MG/1
40 TABLET, DELAYED RELEASE ORAL
Refills: 0 | Status: ACTIVE | OUTPATIENT
Start: 2021-11-14 | End: 2022-10-13

## 2021-11-14 RX ORDER — CARVEDILOL PHOSPHATE 80 MG/1
3.12 CAPSULE, EXTENDED RELEASE ORAL EVERY 12 HOURS
Refills: 0 | Status: ACTIVE | OUTPATIENT
Start: 2021-11-14 | End: 2022-10-13

## 2021-11-14 RX ADMIN — HYDROMORPHONE HYDROCHLORIDE 1 MILLIGRAM(S): 2 INJECTION INTRAMUSCULAR; INTRAVENOUS; SUBCUTANEOUS at 04:01

## 2021-11-14 RX ADMIN — Medication 2: at 17:14

## 2021-11-14 RX ADMIN — PIPERACILLIN AND TAZOBACTAM 25 GRAM(S): 4; .5 INJECTION, POWDER, LYOPHILIZED, FOR SOLUTION INTRAVENOUS at 21:41

## 2021-11-14 RX ADMIN — OXYCODONE HYDROCHLORIDE 10 MILLIGRAM(S): 5 TABLET ORAL at 04:31

## 2021-11-14 RX ADMIN — OXYCODONE HYDROCHLORIDE 10 MILLIGRAM(S): 5 TABLET ORAL at 12:32

## 2021-11-14 RX ADMIN — Medication 2: at 12:33

## 2021-11-14 RX ADMIN — HYDROMORPHONE HYDROCHLORIDE 1 MILLIGRAM(S): 2 INJECTION INTRAMUSCULAR; INTRAVENOUS; SUBCUTANEOUS at 09:15

## 2021-11-14 RX ADMIN — OXYCODONE HYDROCHLORIDE 10 MILLIGRAM(S): 5 TABLET ORAL at 21:40

## 2021-11-14 RX ADMIN — INSULIN GLARGINE 35 UNIT(S): 100 INJECTION, SOLUTION SUBCUTANEOUS at 21:30

## 2021-11-14 RX ADMIN — HYDROMORPHONE HYDROCHLORIDE 1 MILLIGRAM(S): 2 INJECTION INTRAMUSCULAR; INTRAVENOUS; SUBCUTANEOUS at 14:12

## 2021-11-14 RX ADMIN — OXYCODONE HYDROCHLORIDE 10 MILLIGRAM(S): 5 TABLET ORAL at 05:01

## 2021-11-14 RX ADMIN — OXYCODONE HYDROCHLORIDE 10 MILLIGRAM(S): 5 TABLET ORAL at 11:58

## 2021-11-14 RX ADMIN — PANTOPRAZOLE SODIUM 40 MILLIGRAM(S): 20 TABLET, DELAYED RELEASE ORAL at 09:35

## 2021-11-14 RX ADMIN — HEPARIN SODIUM 5000 UNIT(S): 5000 INJECTION INTRAVENOUS; SUBCUTANEOUS at 05:35

## 2021-11-14 RX ADMIN — PIPERACILLIN AND TAZOBACTAM 25 GRAM(S): 4; .5 INJECTION, POWDER, LYOPHILIZED, FOR SOLUTION INTRAVENOUS at 09:35

## 2021-11-14 RX ADMIN — HEPARIN SODIUM 5000 UNIT(S): 5000 INJECTION INTRAVENOUS; SUBCUTANEOUS at 21:30

## 2021-11-14 RX ADMIN — TAMSULOSIN HYDROCHLORIDE 0.4 MILLIGRAM(S): 0.4 CAPSULE ORAL at 21:40

## 2021-11-14 RX ADMIN — HEPARIN SODIUM 5000 UNIT(S): 5000 INJECTION INTRAVENOUS; SUBCUTANEOUS at 13:04

## 2021-11-14 RX ADMIN — HYDROMORPHONE HYDROCHLORIDE 1 MILLIGRAM(S): 2 INJECTION INTRAMUSCULAR; INTRAVENOUS; SUBCUTANEOUS at 03:31

## 2021-11-14 RX ADMIN — HYDROMORPHONE HYDROCHLORIDE 1 MILLIGRAM(S): 2 INJECTION INTRAMUSCULAR; INTRAVENOUS; SUBCUTANEOUS at 14:30

## 2021-11-14 RX ADMIN — Medication 62.5 MILLIMOLE(S): at 12:32

## 2021-11-14 RX ADMIN — OXYCODONE HYDROCHLORIDE 10 MILLIGRAM(S): 5 TABLET ORAL at 22:23

## 2021-11-14 RX ADMIN — CARVEDILOL PHOSPHATE 3.12 MILLIGRAM(S): 80 CAPSULE, EXTENDED RELEASE ORAL at 21:40

## 2021-11-14 RX ADMIN — HYDROMORPHONE HYDROCHLORIDE 1 MILLIGRAM(S): 2 INJECTION INTRAMUSCULAR; INTRAVENOUS; SUBCUTANEOUS at 08:30

## 2021-11-14 NOTE — PROVIDER CONTACT NOTE (OTHER) - NAME OF MD/NP/PA/DO NOTIFIED:
Dr. Leonardo Rojas
Dr Diaz
Dr Isabel Kraus
Dr Pineda [phone]
Dr. Pineda (PCP)
Dr. Laureano
Dr. Luna

## 2021-11-14 NOTE — PROGRESS NOTE ADULT - SUBJECTIVE AND OBJECTIVE BOX
SURGERY DAILY PROGRESS NOTE:     Subjective:  Patient seen and examined this AM at bedside. Patient had severe pain at penile region and leakage. frank was kinked and was released and he feels better. Denies any abdominal pain, tolerable with oral pain medications. Tolerating diet w/o n/v. Denies fever/chills, shortness of breath, chest pain. VS reviewed    Objective:    MEDICATIONS  (STANDING):  carvedilol 3.125 milliGRAM(s) Oral every 12 hours  dextrose 40% Gel 15 Gram(s) Oral once  dextrose 5%. 1000 milliLiter(s) (50 mL/Hr) IV Continuous <Continuous>  dextrose 5%. 1000 milliLiter(s) (100 mL/Hr) IV Continuous <Continuous>  dextrose 50% Injectable 25 Gram(s) IV Push once  dextrose 50% Injectable 12.5 Gram(s) IV Push once  dextrose 50% Injectable 25 Gram(s) IV Push once  glucagon  Injectable 1 milliGRAM(s) IntraMuscular once  heparin   Injectable 5000 Unit(s) SubCutaneous every 8 hours  influenza   Vaccine 0.5 milliLiter(s) IntraMuscular once  insulin glargine Injectable (LANTUS) 35 Unit(s) SubCutaneous at bedtime  insulin lispro (ADMELOG) corrective regimen sliding scale   SubCutaneous three times a day before meals  midodrine. 10 milliGRAM(s) Oral three times a day  nicotine -  14 mG/24Hr(s) Patch 1 patch Transdermal daily  pantoprazole    Tablet 40 milliGRAM(s) Oral before breakfast  piperacillin/tazobactam IVPB.. 3.375 Gram(s) IV Intermittent every 12 hours  tamsulosin 0.4 milliGRAM(s) Oral at bedtime    MEDICATIONS  (PRN):  acetaminophen     Tablet .. 650 milliGRAM(s) Oral every 6 hours PRN Mild Pain (1 - 3)  acetaminophen     Tablet .. 650 milliGRAM(s) Oral every 6 hours PRN Mild Pain (1 - 3)  ALBUTerol    90 MICROgram(s) HFA Inhaler 2 Puff(s) Inhalation every 6 hours PRN Bronchospasm  HYDROmorphone  Injectable 1 milliGRAM(s) IV Push every 4 hours PRN Severe Pain (7 - 10)  naloxone Injectable 0.1 milliGRAM(s) IV Push every 3 minutes PRN For ANY of the following changes in patient status:  A. RR LESS THAN 10 breaths per minute, B. Oxygen saturation LESS THAN 90%, C. Sedation score of 6  ondansetron Injectable 4 milliGRAM(s) IV Push every 6 hours PRN Nausea  ondansetron Injectable 4 milliGRAM(s) IV Push every 6 hours PRN Nausea and/or Vomiting  oxyCODONE    IR 5 milliGRAM(s) Oral every 6 hours PRN Moderate Pain (4 - 6)  oxyCODONE    IR 10 milliGRAM(s) Oral every 6 hours PRN Severe Pain (7 - 10)  traZODone 50 milliGRAM(s) Oral at bedtime PRN sleep/anxiety      Vital Signs Last 24 Hrs  T(C): 37 (14 Nov 2021 05:31), Max: 37 (14 Nov 2021 05:31)  T(F): 98.6 (14 Nov 2021 05:31), Max: 98.6 (14 Nov 2021 05:31)  HR: 77 (14 Nov 2021 09:00) (65 - 79)  BP: 116/63 (14 Nov 2021 09:00) (95/50 - 128/71)  BP(mean): 74 (14 Nov 2021 09:00) (57 - 85)  RR: 17 (14 Nov 2021 09:00) (14 - 21)  SpO2: 92% (14 Nov 2021 09:00) (92% - 98%)      PHYSICAL EXAM   GENERAL: NAD, AOx3, well developed, morbidly obese  HEAD: Atraumatic, normocephalic  ENT: moist mucous membrane  NECK: supple, No JVD, midline trachea  CHEST/LUNG: unlabored respirations to room air  Heart: RRR  ABDOMEN: soft, obese, non-tender, right side ileostomy in place with increased stool output, midline dressing mildly soiled, changed at bedside this am  : frank in place   EXTREMITIES: b/l pitting edema  NERVOUS SYSTEM: AOx3, speech clear, no neuro-deficits  MSK: full ROM, no deformities        I&O's Detail    13 Nov 2021 07:01  -  14 Nov 2021 07:00  --------------------------------------------------------  IN:    IV PiggyBack: 129 mL    Lactated Ringers: 700 mL    sodium chloride 0.9%: 661 mL  Total IN: 1490 mL    OUT:    Ileostomy (mL): 650 mL    Indwelling Catheter - Urethral (mL): 1200 mL  Total OUT: 1850 mL    Total NET: -360 mL      14 Nov 2021 07:01  -  14 Nov 2021 10:59  --------------------------------------------------------  IN:  Total IN: 0 mL    OUT:    Indwelling Catheter - Urethral (mL): 275 mL    Voided (mL): 100 mL  Total OUT: 375 mL    Total NET: -375 mL          Daily     Daily     LABS:                        12.1   13.07 )-----------( 342      ( 14 Nov 2021 06:33 )             41.2     11-14    138  |  105  |  18  ----------------------------<  137<H>  4.5   |  29  |  1.09    Ca    8.7      14 Nov 2021 06:33  Phos  1.9     11-14  Mg     2.3     11-14            RADIOLOGY & ADDITIONAL STUDIES:

## 2021-11-14 NOTE — PROGRESS NOTE ADULT - ATTENDING COMMENTS
Pt feeling well, abd less distended.  Will discuss starting antibiotics for diverticulitis with surgical team.  Will monitor blood sugars and adjust accordingly for goal < 180.  Awaiting cardiac work up for pre-op evaluation.  If stress and echo okay can proceed for surgery, pt otherwise optimized.
Patient seen and examined. Had some issues with frank overnight as well as some abdominal pain. Tolerating diet and ileostomy functioning. Incision clean and intact with staples with some surrounding stable erythema of the abdominal wall. Plan to remove frank, stop fluids, regular diet and down grade from step down. Continue Zosyn
56yoM with chronic smoldering sigmoid diverticulitis s/p prior end colostomy without resection of sigmoid, multiple comorbidities including poorly controlled diabetes, CHF, obesity, now POD#1 s/p sigmoid colectomy, closure of colostomy with colorectal anastomosis and diverting loop ileostomy.  Required BiPAP overnight due to poor oxygen saturations, improving now and on NC.  Denies N/V.  Ostomy without output.  Pain controlled - on PCA.  On exam abdomen is obese, soft, appropriately tender near incisions, dressings in place, ileostomy edematous but pink and perfused, no output in appliance.  Labs with VESTA with Cr 1.66.  Nye with 900cc clear yellow urine.    Can start with sips of cledars if no longer requires BiPAP.  Continue pain control.  Encourage OOB.  DC Toradol, switch to heparin for DVT prophylaxis, renally dose Zosyn given VESTA.  Continue Nye given colovesical fistula.  Continue to monitor labs and vitals.  Ostomy education.  I did impress upon the patient that he needs to have his medical issues under much better control prior to consideration of ostomy reversal in the future.  This was also discussed with his sister yesterday.
Patient seen and examined this morning.  He is status post colostomy reversal with sigmoid colon resection, colovesical fistula dilated up ileostomy.  Making urine and has some output from the ileostomy.  Tolerating clear liquids and is hungry for more.  Abdomen is soft, obese and nontender.  Incision intact with staples with mild erythema around the incision as well as the prior colostomy site.  Ileostomy healthy and pink with some output.  Advance to full liquid diet, decrease IV fluids to 50 cc.  Patient had VESTA noted with creatinine up to 1.66 yesterday postoperatively.  This is improving and his creatinine is down to 1.3 today.  Ambulate and I-S.  Continue antibiotics.  Will determine Nye removal, either today or tomorrow.
Pt feeling well, abd less distended.  Will discuss starting antibiotics for diverticulitis with surgical team.  Will monitor blood sugars and adjust accordingly for goal < 180.  Awaiting cardiac work up for pre-op evaluation.  If stress and echo okay can proceed for surgery, pt otherwise optimized.

## 2021-11-14 NOTE — PROVIDER CONTACT NOTE (OTHER) - REASON
preop for reversal of his colostomy h/o DM, CHF, HTN
pt complaints of pain and burning/leaking urine around catheter
Dr. Pineda (PCP)
cardiology consult
emma chua
notified pcp
gastroenterology consult

## 2021-11-14 NOTE — PROGRESS NOTE ADULT - SUBJECTIVE AND OBJECTIVE BOX
57 yo M with PMH of DM, HTN, CHF, b/l LE swelling, Obesity(BMI 49), h/o diverticulitis/diverticular abscess s/p left colostomy 07/2021 at [facility] was seen in the ED for abdominal pain. Pt reports he is suffering from this colostomy bag and wants it reversed. Denies fever or chills, n/v/d/c. Pt reports the colostomy is functional.    POD#3 Open resection, sigmoid colon  Mobilization of splenic flexure   Open loop ileostomy   Reanastomosis, colon, with reversal of Kamilla pouch   Repair of colovesical fistula   Evacuation of pelvic abscess   Cystoscopy with insertion of ureteral stent in adult  bilateral, external     Vital Signs Last 24 Hrs  T(C): 36.3 (13 Nov 2021 09:40), Max: 36.6 (12 Nov 2021 16:45)  T(F): 97.4 (13 Nov 2021 09:40), Max: 97.9 (12 Nov 2021 16:45)  HR: 72 (13 Nov 2021 12:00) (59 - 72)  BP: 95/50 (13 Nov 2021 12:00) (92/52 - 112/65)  BP(mean): 62 (13 Nov 2021 12:00) (62 - 81)  RR: 17 (13 Nov 2021 12:00) (13 - 21)  SpO2: 93% (13 Nov 2021 12:00) (93% - 96%)    Constitutional: NAD, awake and alert, obese   HEENT: PERR, EOMI, Normal Hearing, MMM  Neck: Soft and supple, No LAD, No JVD  Respiratory: Breath sounds are clear bilaterally, No wheezing, rales or rhonchi  Cardiovascular: S1 and S2, regular rate and rhythm, no Murmurs, gallops or rubs  Gastrointestinal: Bowel Sounds present, soft, nontender, nondistended, no guarding, no rebound. + ostomy right side ileostomy in place with sweat  Extremities: No peripheral edema  Vascular: 2+ peripheral pulses  Neurological: A/O x 3, no focal deficits  Musculoskeletal: 5/5 strength b/l upper and lower extremities  Skin: No rashes                            12.1   13.07 )-----------( 342      ( 14 Nov 2021 06:33 )             41.2   11-14    138  |  105  |  18  ----------------------------<  137<H>  4.5   |  29  |  1.09    Ca    8.7      14 Nov 2021 06:33  Phos  1.9     11-14  Mg     2.3     11-14                   MEDICATIONS  (STANDING):  carvedilol 3.125 milliGRAM(s) Oral every 12 hours  heparin   Injectable 5000 Unit(s) SubCutaneous every 8 hours  influenza   Vaccine 0.5 milliLiter(s) IntraMuscular once  insulin glargine Injectable (LANTUS) 35 Unit(s) SubCutaneous at bedtime  insulin lispro (ADMELOG) corrective regimen sliding scale   SubCutaneous three times a day before meals  midodrine. 10 milliGRAM(s) Oral three times a day  nicotine -  14 mG/24Hr(s) Patch 1 patch Transdermal daily  pantoprazole    Tablet 40 milliGRAM(s) Oral before breakfast  piperacillin/tazobactam IVPB.. 3.375 Gram(s) IV Intermittent every 12 hours  sodium phosphate IVPB 15 milliMole(s) IV Intermittent once  tamsulosin 0.4 milliGRAM(s) Oral at bedtime

## 2021-11-14 NOTE — PROGRESS NOTE ADULT - ASSESSMENT
55yo M with multiple comorbidities, including poorly controlled diabetes and morbid obesity, with diverticulitis s/p left side colostomy here for sigmoidectomy and colostomy reversal  s/p stress test  normal.  11/11--> s/p ex-lap, open resection, colostomy takedown/reversal, reanastomosis, creation of diverting loop ileostomy, repair of colovesical fistula, evacuation of pelvic abscess.    Plan:  pain control prn  monitor VS   advance to LRD today  GI recs appreciated  dc frank today - TOV afternoon  continue IV ABx  endocrine recs appreciated for uncontrolled DM, last A1C 11.4. Glc better controlled  cardiology, medicine recs appreciated   appreciate pulmonology consult for postop desaturation, currently off BiPAP, suspecting undiagnosed RAFFI  continue home meds  ambulation  GI/DVT prop    Plan discussed with colorectal surgery team and attending, Dr. Garcia

## 2021-11-14 NOTE — PROVIDER CONTACT NOTE (OTHER) - SITUATION
Spoke to Valerie from office. It is Drs. patient.
Left consult with office. Spoke to Santa.
Called to notify pt is in [facility], please fax discharge to [phone]. Spoke to Daxa.
Pt complaints of pain "12 out of 10" to frank catheter site. Urine blood tinged with small blood clots. Urine leaking from urethra around catheter as well.
Spoke with Dr shay. He is aware.
notified Dr Diaz of consult
notified Dr Pineda's office of admission please fax over discharged paperwork to [phone] once pt is discharged

## 2021-11-14 NOTE — PROVIDER CONTACT NOTE (OTHER) - DATE AND TIME:
08-Nov-2021 12:13
08-Nov-2021 12:19
08-Nov-2021 14:23
08-Nov-2021 14:34
12-Nov-2021 12:08
14-Nov-2021 03:20

## 2021-11-14 NOTE — PROGRESS NOTE ADULT - ASSESSMENT
57 y/o male w/ pmhx of DM, HTN, CHF, b/l LE swelling, Obesity (BMI 49), h/o diverticulitis/diverticular abscess s/p left colostomy 07/2021 presenting for re-evaluation of colostomy/requesting reversal.     # abd pain due to colostomy and hx of diverticulitis   - cont w/ zosyn Q 8 hrs: E coli post op report sensitive to Mauricio  POD#3  leukocytosis is improving  he also had stents removed changed  creatinine up dc ACEi      # hx of DM 2 well ctr  - cont w/ lantus 35 and ISS       # chronic diastolic heart failure   stable  bp meds held borderline BP; Coreg dose was decreased to 3.125  DC PCA  start midodrine    Was hypoxic at night suspect RAFFI; i d/c steroids- diabetic might have hard time healing after surgery  BIPAP at night- pt refuses

## 2021-11-15 ENCOUNTER — TRANSCRIPTION ENCOUNTER (OUTPATIENT)
Age: 56
End: 2021-11-15

## 2021-11-15 VITALS
RESPIRATION RATE: 17 BRPM | HEART RATE: 81 BPM | DIASTOLIC BLOOD PRESSURE: 88 MMHG | OXYGEN SATURATION: 94 % | TEMPERATURE: 98 F | SYSTOLIC BLOOD PRESSURE: 160 MMHG

## 2021-11-15 LAB
ANION GAP SERPL CALC-SCNC: 5 MMOL/L — SIGNIFICANT CHANGE UP (ref 5–17)
BUN SERPL-MCNC: 13 MG/DL — SIGNIFICANT CHANGE UP (ref 7–23)
CALCIUM SERPL-MCNC: 8.8 MG/DL — SIGNIFICANT CHANGE UP (ref 8.5–10.1)
CHLORIDE SERPL-SCNC: 103 MMOL/L — SIGNIFICANT CHANGE UP (ref 96–108)
CO2 SERPL-SCNC: 30 MMOL/L — SIGNIFICANT CHANGE UP (ref 22–31)
CREAT SERPL-MCNC: 1.09 MG/DL — SIGNIFICANT CHANGE UP (ref 0.5–1.3)
GLUCOSE SERPL-MCNC: 157 MG/DL — HIGH (ref 70–99)
HCT VFR BLD CALC: 40.3 % — SIGNIFICANT CHANGE UP (ref 39–50)
HGB BLD-MCNC: 12 G/DL — LOW (ref 13–17)
MAGNESIUM SERPL-MCNC: 2.2 MG/DL — SIGNIFICANT CHANGE UP (ref 1.6–2.6)
MCHC RBC-ENTMCNC: 26.5 PG — LOW (ref 27–34)
MCHC RBC-ENTMCNC: 29.8 GM/DL — LOW (ref 32–36)
MCV RBC AUTO: 89.2 FL — SIGNIFICANT CHANGE UP (ref 80–100)
PHOSPHATE SERPL-MCNC: 1.8 MG/DL — LOW (ref 2.5–4.5)
PLATELET # BLD AUTO: 401 K/UL — HIGH (ref 150–400)
POTASSIUM SERPL-MCNC: 4.3 MMOL/L — SIGNIFICANT CHANGE UP (ref 3.5–5.3)
POTASSIUM SERPL-SCNC: 4.3 MMOL/L — SIGNIFICANT CHANGE UP (ref 3.5–5.3)
RBC # BLD: 4.52 M/UL — SIGNIFICANT CHANGE UP (ref 4.2–5.8)
RBC # FLD: 14.5 % — SIGNIFICANT CHANGE UP (ref 10.3–14.5)
SARS-COV-2 RNA SPEC QL NAA+PROBE: SIGNIFICANT CHANGE UP
SODIUM SERPL-SCNC: 138 MMOL/L — SIGNIFICANT CHANGE UP (ref 135–145)
WBC # BLD: 9.1 K/UL — SIGNIFICANT CHANGE UP (ref 3.8–10.5)
WBC # FLD AUTO: 9.1 K/UL — SIGNIFICANT CHANGE UP (ref 3.8–10.5)

## 2021-11-15 RX ORDER — LISINOPRIL 2.5 MG/1
1 TABLET ORAL
Qty: 0 | Refills: 0 | DISCHARGE

## 2021-11-15 RX ADMIN — Medication 2: at 08:53

## 2021-11-15 RX ADMIN — CARVEDILOL PHOSPHATE 3.12 MILLIGRAM(S): 80 CAPSULE, EXTENDED RELEASE ORAL at 09:37

## 2021-11-15 RX ADMIN — PANTOPRAZOLE SODIUM 40 MILLIGRAM(S): 20 TABLET, DELAYED RELEASE ORAL at 06:24

## 2021-11-15 RX ADMIN — OXYCODONE HYDROCHLORIDE 10 MILLIGRAM(S): 5 TABLET ORAL at 05:38

## 2021-11-15 RX ADMIN — OXYCODONE HYDROCHLORIDE 10 MILLIGRAM(S): 5 TABLET ORAL at 09:38

## 2021-11-15 RX ADMIN — HEPARIN SODIUM 5000 UNIT(S): 5000 INJECTION INTRAVENOUS; SUBCUTANEOUS at 06:24

## 2021-11-15 RX ADMIN — HYDROMORPHONE HYDROCHLORIDE 1 MILLIGRAM(S): 2 INJECTION INTRAMUSCULAR; INTRAVENOUS; SUBCUTANEOUS at 01:05

## 2021-11-15 RX ADMIN — HYDROMORPHONE HYDROCHLORIDE 1 MILLIGRAM(S): 2 INJECTION INTRAMUSCULAR; INTRAVENOUS; SUBCUTANEOUS at 00:35

## 2021-11-15 RX ADMIN — OXYCODONE HYDROCHLORIDE 10 MILLIGRAM(S): 5 TABLET ORAL at 04:38

## 2021-11-15 NOTE — DISCHARGE NOTE PROVIDER - CARE PROVIDER_API CALL
Leif Lagos)  ColonRectal Surgery; [place]  [address] [address]  Phone: [phone]  Fax: [phone]  Follow Up Time: 2 weeks

## 2021-11-15 NOTE — DISCHARGE NOTE PROVIDER - NSDCCPTREATMENT_GEN_ALL_CORE_FT
PRINCIPAL PROCEDURE  Procedure: Open resection, sigmoid colon  Findings and Treatment:       SECONDARY PROCEDURE  Procedure: Reanastomosis, colon, with reversal of Kamilla pouch  Findings and Treatment:     Procedure: Repair of colovesical fistula  Findings and Treatment:     Procedure: Open loop ileostomy  Findings and Treatment:     Procedure: Mobilization of splenic flexure  Findings and Treatment:     Procedure: Cystoscopy with insertion of ureteral stent in adult  Findings and Treatment: bilateral, external

## 2021-11-15 NOTE — PROGRESS NOTE ADULT - SUBJECTIVE AND OBJECTIVE BOX
SURGERY DAILY PROGRESS NOTE:     Subjective:  Patient was seen and examined this AM at bedside. Patient feels much better since Frank removed. Denies any abdominal pain, tolerated with oral pain medications. Tolerating diet w/o n/v. Denies fever/chills, shortness of breath, chest pain. VS reviewed    Objective:  MEDICATIONS  (STANDING):  carvedilol 3.125 milliGRAM(s) Oral every 12 hours  dextrose 40% Gel 15 Gram(s) Oral once  dextrose 5%. 1000 milliLiter(s) (50 mL/Hr) IV Continuous <Continuous>  dextrose 5%. 1000 milliLiter(s) (100 mL/Hr) IV Continuous <Continuous>  dextrose 50% Injectable 25 Gram(s) IV Push once  dextrose 50% Injectable 12.5 Gram(s) IV Push once  dextrose 50% Injectable 25 Gram(s) IV Push once  glucagon  Injectable 1 milliGRAM(s) IntraMuscular once  heparin   Injectable 5000 Unit(s) SubCutaneous every 8 hours  influenza   Vaccine 0.5 milliLiter(s) IntraMuscular once  insulin glargine Injectable (LANTUS) 35 Unit(s) SubCutaneous at bedtime  insulin lispro (ADMELOG) corrective regimen sliding scale   SubCutaneous three times a day before meals  midodrine. 10 milliGRAM(s) Oral three times a day  nicotine -  14 mG/24Hr(s) Patch 1 patch Transdermal daily  pantoprazole    Tablet 40 milliGRAM(s) Oral before breakfast  piperacillin/tazobactam IVPB.. 3.375 Gram(s) IV Intermittent every 12 hours  tamsulosin 0.4 milliGRAM(s) Oral at bedtime    MEDICATIONS  (PRN):  acetaminophen     Tablet .. 650 milliGRAM(s) Oral every 6 hours PRN Mild Pain (1 - 3)  acetaminophen     Tablet .. 650 milliGRAM(s) Oral every 6 hours PRN Mild Pain (1 - 3)  ALBUTerol    90 MICROgram(s) HFA Inhaler 2 Puff(s) Inhalation every 6 hours PRN Bronchospasm  HYDROmorphone  Injectable 1 milliGRAM(s) IV Push every 4 hours PRN Severe Pain (7 - 10)  naloxone Injectable 0.1 milliGRAM(s) IV Push every 3 minutes PRN For ANY of the following changes in patient status:  A. RR LESS THAN 10 breaths per minute, B. Oxygen saturation LESS THAN 90%, C. Sedation score of 6  ondansetron Injectable 4 milliGRAM(s) IV Push every 6 hours PRN Nausea and/or Vomiting  ondansetron Injectable 4 milliGRAM(s) IV Push every 6 hours PRN Nausea  oxyCODONE    IR 5 milliGRAM(s) Oral every 6 hours PRN Moderate Pain (4 - 6)  oxyCODONE    IR 10 milliGRAM(s) Oral every 6 hours PRN Severe Pain (7 - 10)  traZODone 50 milliGRAM(s) Oral at bedtime PRN sleep/anxiety    Vital Signs Last 24 Hrs  T(C): 37.3 (15 Nov 2021 05:03), Max: 37.3 (15 Nov 2021 05:03)  T(F): 99.1 (15 Nov 2021 05:03), Max: 99.1 (15 Nov 2021 05:03)  HR: 81 (15 Nov 2021 05:03) (70 - 81)  BP: 152/85 (15 Nov 2021 05:03) (102/84 - 152/85)  BP(mean): 88 (14 Nov 2021 14:00) (74 - 105)  RR: 16 (15 Nov 2021 05:03) (15 - 18)  SpO2: 96% (15 Nov 2021 05:03) (92% - 99%)    PHYSICAL EXAM   GENERAL: NAD, AOx3, well developed, morbidly obese  HEAD: Atraumatic, normocephalic  ENT: moist mucous membrane  NECK: supple, No JVD, midline trachea  CHEST/LUNG: unlabored respirations to room air  Heart: RRR  ABDOMEN: soft, obese, non-tender, right side ileostomy in place with increased stool output, midline dressing mildly soiled, changed at bedside this am  : frank in place   EXTREMITIES: b/l pitting edema  NERVOUS SYSTEM: AOx3, speech clear, no neuro-deficits  MSK: full ROM, no deformities    I&O's Detail    14 Nov 2021 07:01  -  15 Nov 2021 07:00  --------------------------------------------------------  IN:  Total IN: 0 mL    OUT:    Ileostomy (mL): 550 mL    Indwelling Catheter - Urethral (mL): 275 mL    Voided (mL): 500 mL  Total OUT: 1325 mL    Total NET: -1325 mL      LABS:  pending    Ca    8.7        14 Nov 2021 06:33

## 2021-11-15 NOTE — DISCHARGE NOTE NURSING/CASE MANAGEMENT/SOCIAL WORK - PATIENT PORTAL LINK FT
You can access the FollowMyHealth Patient Portal offered by [facility] by registering at the following website: [email or web address]. By joining [employer]’s FollowMyHealth portal, you will also be able to view your health information using other applications (apps) compatible with our system.

## 2021-11-15 NOTE — PROGRESS NOTE ADULT - ASSESSMENT
55 yo M with multiple comorbidities, including poorly controlled diabetes and morbid obesity, with diverticulitis s/p left side colostomy here for sigmoidectomy and colostomy reversal  s/p stress test  normal.  11/11--> s/p ex-lap, open resection, colostomy takedown/reversal, reanastomosis, creation of diverting loop ileostomy, repair of colovesical fistula, evacuation of pelvic abscess.    Plan:  pain control prn  monitor VS   tolerating LRD  GI recs appreciated  continue IV ABx while inpatient  endocrine recs appreciated for uncontrolled DM, last A1C 11.4. Glc better controlled  cardiology, medicine recs appreciated   appreciate pulmonology recs for postop desaturation, currently off BiPAP, suspecting undiagnosed RAFFI  continue home meds  ambulation  GI/DVT prop  plan for discharge home today    Plan discussed with colorectal surgery team and attending, Dr. Lagos

## 2021-11-15 NOTE — DISCHARGE NOTE PROVIDER - NSDCCPCAREPLAN_GEN_ALL_CORE_FT
PRINCIPAL DISCHARGE DIAGNOSIS  Diagnosis: Diverticulitis  Assessment and Plan of Treatment:       SECONDARY DISCHARGE DIAGNOSES  Diagnosis: Postprocedural intraabdominal abscess  Assessment and Plan of Treatment:      PRINCIPAL DISCHARGE DIAGNOSIS  Diagnosis: Diverticulitis  Assessment and Plan of Treatment: stable; the BP med was changed; stop Lisinopril until you see PCP and resume Coreg; your BP was on the lower side here you need BP check this week with PCP      SECONDARY DISCHARGE DIAGNOSES  Diagnosis: Postprocedural intraabdominal abscess  Assessment and Plan of Treatment:

## 2021-11-15 NOTE — DISCHARGE NOTE PROVIDER - NSDCMRMEDTOKEN_GEN_ALL_CORE_FT
Admelog 100 units/mL injectable solution: Inject subcutaneously with meals per sliding scale as directed  ALPRAZolam 2 mg oral tablet: 1 tab(s) orally 2 times a day, As Needed  Aspirin Enteric Coated 81 mg oral delayed release tablet: 1 tab(s) orally once a day  atorvastatin 40 mg oral tablet: 1 tab(s) orally once a day (at bedtime)  carvedilol 12.5 mg oral tablet: 1 tab(s) orally 2 times a day  furosemide 40 mg oral tablet: 1 tab(s) orally once a day  lisinopril 20 mg oral tablet: 1 tab(s) orally once a day  metFORMIN 500 mg oral tablet: 1 tab(s) orally 2 times a day  oxycodone-acetaminophen 5 mg-325 mg oral tablet: 1 tab(s) orally every 6 hours, As Needed -for moderate pain MDD:004  [employer] [facility] [employer] COVID-19 Vaccine PF 30 mcg/0.3 mL intramuscular suspension: 0.3 milliliter(s) intramuscular once  ***rec&#x27;d both doses***  Semglee Prefilled Pen 100 units/mL subcutaneous solution: 50 unit(s) subcutaneously once a day (at bedtime)  tamsulosin 0.4 mg oral capsule: 1 cap(s) orally once a day   Admelog 100 units/mL injectable solution: Inject subcutaneously with meals per sliding scale as directed  ALPRAZolam 2 mg oral tablet: 1 tab(s) orally 2 times a day, As Needed  Aspirin Enteric Coated 81 mg oral delayed release tablet: 1 tab(s) orally once a day  atorvastatin 40 mg oral tablet: 1 tab(s) orally once a day (at bedtime)  carvedilol 12.5 mg oral tablet: 1 tab(s) orally 2 times a day  furosemide 40 mg oral tablet: 1 tab(s) orally once a day  metFORMIN 500 mg oral tablet: 1 tab(s) orally 2 times a day  oxycodone-acetaminophen 5 mg-325 mg oral tablet: 1 tab(s) orally every 6 hours, As Needed -for moderate pain MDD:004  [employer] COVID-19 Vaccine PF 30 mcg/0.3 mL intramuscular suspension: 0.3 milliliter(s) intramuscular once  ***rec&#x27;d both doses***  Semglee Prefilled Pen 100 units/mL subcutaneous solution: 50 unit(s) subcutaneously once a day (at bedtime)  tamsulosin 0.4 mg oral capsule: 1 cap(s) orally once a day

## 2021-11-15 NOTE — PROGRESS NOTE ADULT - PROVIDER SPECIALTY LIST ADULT
Hospitalist
Cardiology
Colorectal Surgery
Hospitalist
Cardiology
Colorectal Surgery
Gastroenterology
Hospitalist
Hospitalist
Pulmonology
Pulmonology
Surgery
Cardiology
Colorectal Surgery
Hospitalist
Pulmonology
Pulmonology
Hospitalist

## 2021-11-15 NOTE — ADVANCED PRACTICE NURSE CONSULT - ASSESSMENT
This is a 56 year old male with a Loop Ileostomy created on 11/11/2021. Patient was educated at bedside. He has a history colostomy. The patient was very receptive to the education. We discussed diet, what to eat and what to avoid. Bridge removed and ostomy functioning well with soft brown stool. He provided teach back on how to cut wafer and empty pouch. Peristomal skin intact. He will require a 2 3/4 wafer and pouch as his Stoma measures 1 3/4, but edematous. Patient enrolled in [facility].

## 2021-11-15 NOTE — DISCHARGE NOTE PROVIDER - HOSPITAL COURSE
57 yo M with multiple comorbidities, including poorly controlled diabetes and morbid obesity, with diverticulitis s/p left side colostomy here for sigmoidectomy and colostomy reversal  s/p stress test  normal. Echo within normal limits  11/11--> s/p ex-lap, open resection, colostomy takedown/reversal, reanastomosis, creation of diverting loop ileostomy, repair of colovesical fistula, evacuation of pelvic abscess.    Plan:  pain control prn  monitor VS   tolerating LRD  continue IV ABx while inpatient  endocrine recs appreciated for uncontrolled DM, last A1C 11.4. Glc better controlled  cardiology, medicine recs appreciated   appreciate pulmonology recs for postop desaturation, currently off BiPAP, suspecting undiagnosed RAFFI, outpt follow up   continue home meds  ambulation  GI/DVT prop  plan for discharge home today  home care for ileostomy

## 2021-11-19 DIAGNOSIS — G89.18 OTHER ACUTE POSTPROCEDURAL PAIN: ICD-10-CM

## 2021-11-20 ENCOUNTER — NON-APPOINTMENT (OUTPATIENT)
Age: 56
End: 2021-11-20

## 2021-11-29 ENCOUNTER — APPOINTMENT (OUTPATIENT)
Dept: COLORECTAL SURGERY | Facility: CLINIC | Age: 56
End: 2021-11-29
Payer: MEDICAID

## 2021-11-29 VITALS
TEMPERATURE: 97.8 F | OXYGEN SATURATION: 93 % | WEIGHT: 315 LBS | HEIGHT: 69 IN | BODY MASS INDEX: 46.65 KG/M2 | HEART RATE: 85 BPM | RESPIRATION RATE: 14 BRPM | DIASTOLIC BLOOD PRESSURE: 97 MMHG | SYSTOLIC BLOOD PRESSURE: 177 MMHG

## 2021-11-29 PROCEDURE — 99212 OFFICE O/P EST SF 10 MIN: CPT

## 2021-11-30 DIAGNOSIS — J44.1 CHRONIC OBSTRUCTIVE PULMONARY DISEASE WITH (ACUTE) EXACERBATION: ICD-10-CM

## 2021-11-30 DIAGNOSIS — E66.01 MORBID (SEVERE) OBESITY DUE TO EXCESS CALORIES: ICD-10-CM

## 2021-11-30 DIAGNOSIS — K57.32 DIVERTICULITIS OF LARGE INTESTINE WITHOUT PERFORATION OR ABSCESS WITHOUT BLEEDING: ICD-10-CM

## 2021-11-30 DIAGNOSIS — I11.0 HYPERTENSIVE HEART DISEASE WITH HEART FAILURE: ICD-10-CM

## 2021-11-30 DIAGNOSIS — G47.33 OBSTRUCTIVE SLEEP APNEA (ADULT) (PEDIATRIC): ICD-10-CM

## 2021-11-30 DIAGNOSIS — N17.9 ACUTE KIDNEY FAILURE, UNSPECIFIED: ICD-10-CM

## 2021-11-30 DIAGNOSIS — J98.11 ATELECTASIS: ICD-10-CM

## 2021-11-30 DIAGNOSIS — J96.91 RESPIRATORY FAILURE, UNSPECIFIED WITH HYPOXIA: ICD-10-CM

## 2021-11-30 DIAGNOSIS — E11.9 TYPE 2 DIABETES MELLITUS WITHOUT COMPLICATIONS: ICD-10-CM

## 2021-11-30 DIAGNOSIS — K57.20 DIVERTICULITIS OF LARGE INTESTINE WITH PERFORATION AND ABSCESS WITHOUT BLEEDING: ICD-10-CM

## 2021-11-30 DIAGNOSIS — I50.32 CHRONIC DIASTOLIC (CONGESTIVE) HEART FAILURE: ICD-10-CM

## 2021-12-01 RX ORDER — LISINOPRIL 20 MG/1
20 TABLET ORAL
Qty: 30 | Refills: 0 | Status: ACTIVE | COMMUNITY
Start: 2021-11-26

## 2021-12-01 RX ORDER — FUROSEMIDE 20 MG/1
20 TABLET ORAL
Qty: 30 | Refills: 0 | Status: ACTIVE | COMMUNITY
Start: 2021-11-26

## 2021-12-01 RX ORDER — ALPRAZOLAM 2 MG/1
2 TABLET ORAL
Qty: 60 | Refills: 0 | Status: ACTIVE | COMMUNITY
Start: 2021-11-16

## 2021-12-01 NOTE — HISTORY OF PRESENT ILLNESS
[FreeTextEntry1] : 56 year old morbid obese male with history of complicated diverticulitis for several years. patient had a large abscess which was eroding into bladder and prostate, per his report. underwent a diverting colostomy, but colon was not resected. he feels worse with pain.\par He was scheduled for a colonoscopy however could not get medical clearance due to significant hemoglobin A1c level\par \par 11/29/21 - Mr. Spencer presents to the office for POV s/p ex lap, open resection, colostomy reversal, and diverting loop ileostomy. Overall, he doing okay.  Last week, HomeCare RN called the office and reported that colostomy closure site had dehisced and it is being packed with wet to dry dressing daily to the site.  He did report some appliance leakage issues requiring him to change his appliance multiple times per day. Does have watery output; not taking Imodium. Denies fever/chills, n/v.

## 2021-12-01 NOTE — DATA REVIEWED
[FreeTextEntry1] : surgical pathology reviewed - benign \par \par  Pathology             Final\par \par No Documents Attached\par \par \par \par \par   [facility] Accession Number : [id number]\par Patient:   KENZIE MULLNIS\par \par \par Accession:                             [id number]\par \par Collected Date/Time:                   11/11/2021 11:33 EST\par Received Date/Time:                    11/11/2021 15:34 EST\par \par Surgical Pathology Report - Auth (Verified)\par \par Specimen(s) Submitted\par 1  Rectosigmoid colon/vo stump with chronic perforation and abscess\par 2  Colostomy site\par 3  Colostomy site and omentum\par 4  Colorectal anastomosis\par \par Final Diagnosis\par 1. Large bowel, rectosigmoid, excision:\par \par -Diverticulosis and diverticulitis with abscess formation\par \par 2. Colostomy site:\par -Colostomy\par \par 3. Colostomy site and omentum:\par -Benign colon and benign adipose tissue with reactive changes\par \par 4. Colorectal anastomosis:\par -Benign colon\par Verified by: RADHA GOLD M.D.\par (Electronic Signature)\par Reported on: 11/19/21 15:28 EST, [address] [place] [address]\par Phone: [phone]   Fax: [phone]\par _________________________________________________________________\par \par \par Clinical Information\par Sigmoid diverticulitis- perforation and abscess, total vesical fistula,\par morbid obesity\par \par Gross Description\par 1.  Received fresh labeled  "rectosigmoid colon/Kamilla stump with chronic\par perforation and abscess"   and consists of a 20.0 cm in length by 2.5-5.5\par cm in circumference segment of colon with an abundant amount of attached\par adipose tissue. It is received open at one margin and stapled at the\par opposite margin. The serosa is tan-brown, shaggy, dull and indurated.\par Sectioning reveals a possible abscess/fistula site in the middle of\par the specimen and multiple diverticular openings involving the entire\par length of the segment. The mucosa is tan-pink with normal folds and wall\par thickness is 1.0-1.8 cm.\par Representative sections in 5 cassettes:\par 1A-1C: Full-thickness Abscess site, sectioned\par 1D-1E: Representative abscess site, sectioned\par \par 2.  Received: In formalin labeled  "colostomy site"\par Measurement:  3.0 cm in length, 5.0 cm in diameter\par Orientation:  None provided\par Skin:  0.1 cm, gray\par Stoma:  2.2 x 2.0 cm, unremarkable\par Mucosa:  Tan-pink and unremarkable\par Submitted:  Representative sections in one cassette: 2A\par \par 3.  Received in formalin labeled  "colostomy site and omentum"   and consists\par of a 3.2 x 3.0 x 3.0 cm mucosal fragment containing a staple line and a\par 5.5 x 5.4 x 3.0 cm portion of rubbery duarte yellow adipose tissue.\par Representative sections in one cassette: 3A\par \par 4.  Received: In formalin labeled  "colorectal anastomosis"\par Two, 2.1 x 2.0 x 1.7 cm and 2.2 x 2.0 x 1.0 cm\par Size:\par Description:  Pink-tan, congested, annular\par Cut Surface:  Tan, soft with numerous staples and sutures\par Submitted:  Representative sections in one cassette: 4A\par \par Roxy Small 11/15/2021 11:41 AM\par \par Disclaimer\par In addition to other data that may appear on the specimen containers, all\par labels have been inspected to confirm the presence of the patient's name\par and date of birth.\par \par  \par \par  Ordered by: CHARLENE MADSEN       Collected/Examined: 11Nov2021 11:33AM       \par Verified by: ALVAREZ COLES 22Nov2021 09:26AM       \par  Result Communication: No patient communication needed at this time;\par Stage: Final       \par  Performed at: [facility]       Resulted: 19Nov2021 03:28PM       Last Updated: [id number] 09:26AM       Accession: [id number]

## 2021-12-01 NOTE — PHYSICAL EXAM
[Abdomen Masses] : No abdominal masses [Abdomen Tenderness] : ~T No ~M abdominal tenderness [Respiratory Effort] : normal respiratory effort [No Rash or Lesion] : No rash or lesion [Alert] : alert [Oriented to Person] : oriented to person [Oriented to Place] : oriented to place [Oriented to Time] : oriented to time [Calm] : calm [de-identified] : obese, midline surgical incision with some shallow opened areas; small amount of serous drainage noted. no erythema or odor noted. RUQ ileostomy, pink/viable/patent, intact parastomal skin. old colostomy site dehisced measures [id number]; healthy granulating tissue in the wound bed.  [de-identified] : well appearing, no acute distress [de-identified] : RAJINDER x4 [de-identified] : warm and dry

## 2021-12-01 NOTE — ASSESSMENT
[FreeTextEntry1] : 56 year old male who is s/p ex lap, sigmoid resection, colostomy reversal with diverting loop ileostomy creation. Now with colostomy closure site wound dehiscence w/o s/s of infection. Wound measure [id number]. Wound has been packed using wet to dry and then aquacel without much improvement. I will recommend wound vac to old colostomy closure site to heal wound given his morbid obesity and diabetes. Surgical pathology reviewed with patient. All questions/concerns addressed to patient satisfaction.\par \par s/p ex lap, sigmoidectomy, with diverting ileostomy\par - regular diet\par - keep incision clean and dry\par - daily showers with antibacterial soap\par - no heavy lifting for one more month\par - fiber, Imodium\par - barrier ring to assist with leakage prevention, may also consider ostomy belt\par \par surgical wound dehiscence\par - recommend wound VAC therapy to facilitate wound healing\par \par Follow up in the office in 1-2 weeks for wound check.\par \par

## 2021-12-21 ENCOUNTER — APPOINTMENT (OUTPATIENT)
Dept: COLORECTAL SURGERY | Facility: CLINIC | Age: 56
End: 2021-12-21
Payer: MEDICAID

## 2021-12-21 PROCEDURE — 99024 POSTOP FOLLOW-UP VISIT: CPT

## 2021-12-21 NOTE — HISTORY OF PRESENT ILLNESS
[FreeTextEntry1] : 56-year-old morbidly obese male postop sigmoid colon resection for extensive perforated diverticulitis with anastomosis and diverting ileostomy. Doing well overall no complaints

## 2021-12-21 NOTE — ASSESSMENT
[FreeTextEntry1] : Postop sigmoid colon resection with anastomosis and diverting ileostomy.Recommend weight reduction.

## 2021-12-21 NOTE — PHYSICAL EXAM
[Abdomen Masses] : No abdominal masses [Abdomen Tenderness] : ~T No ~M abdominal tenderness [de-identified] : ileostomy in place [de-identified] : Looks well in no distress, of stated age.

## 2022-01-11 NOTE — DISCHARGE NOTE NURSING/CASE MANAGEMENT/SOCIAL WORK - NSDCPETBCESMAN_GEN_ALL_CORE
[FreeTextEntry1] : SHELTON is a 7 yo F with recurrent urticaria. PE negative today for rash, photo shown by father is consistent with urticaria. Advised to stop using rubbing alcohol on skin as it can be abrasive. \par \par - Referral to A&I\par - Benadryl prn for itchiness\par - Epipen Jr prescribed, strict administration and follow up instructions provided\par - Clinic precautions reviewed. ED precautions reviewed including signs of anaphylaxis\par \par Caretaker expressed understanding of the plan and agrees. No other concerns or questions today.
If you are a smoker, it is important for your health to stop smoking. Please be aware that second hand smoke is also harmful.

## 2022-02-01 ENCOUNTER — APPOINTMENT (OUTPATIENT)
Dept: COLORECTAL SURGERY | Facility: CLINIC | Age: 57
End: 2022-02-01
Payer: MEDICAID

## 2022-02-01 VITALS — RESPIRATION RATE: 16 BRPM | BODY MASS INDEX: 46.65 KG/M2 | WEIGHT: 315 LBS | HEIGHT: 69 IN | TEMPERATURE: 96 F

## 2022-02-01 PROCEDURE — 99024 POSTOP FOLLOW-UP VISIT: CPT

## 2022-02-02 NOTE — ASSESSMENT
[FreeTextEntry1] : 56 year-old male morbidly obese with previous history of severe diverticulitis post resection with ileostomy. Recommend laparoscopic possible open reversal of ileostomy.Risk and benefits of the surgery have been discussed which include bleeding, infection, sepsis, multiorgan failure, inadvertent injury including hollow viscus, solid organ, ureter neurovascular and neurological structures, DVT PE, heart attack, stroke, hernias, recurrence, Leakage of anastomosis would require reoperation possible stoma and death.

## 2022-02-02 NOTE — PHYSICAL EXAM
[Abdomen Masses] : No abdominal masses [Abdomen Tenderness] : ~T No ~M abdominal tenderness [Tender] : nontender [JVD] : no jugular venous distention  [Normal Breath Sounds] : Normal breath sounds [Normal Heart Sounds] : normal heart sounds [Normal Rate and Rhythm] : normal rate and rhythm [No Rash or Lesion] : No rash or lesion [Alert] : alert [Oriented to Person] : oriented to person [Oriented to Place] : oriented to place [Oriented to Time] : oriented to time [Calm] : calm [de-identified] : Obese abdomen with right lower quadrant ileostomy, Incisions are healed [de-identified] : Looks well in no distress, of stated age. [de-identified] : pupils equal reactive to light normocephalic atraumatic. [de-identified] : moves all 4 extremities appropriately with 5 over 5 strength

## 2022-02-02 NOTE — HISTORY OF PRESENT ILLNESS
[FreeTextEntry1] : 56-year-old male morbidly obese post sigmoid colon resection for extensive diverticulitis with diverting ileostomy.He is feeling much better and wishes to have his ileostomy reversed.

## 2022-02-03 ENCOUNTER — RESULT REVIEW (OUTPATIENT)
Age: 57
End: 2022-02-03

## 2022-02-11 ENCOUNTER — APPOINTMENT (OUTPATIENT)
Dept: CT IMAGING | Facility: CLINIC | Age: 57
End: 2022-02-11
Payer: MEDICAID

## 2022-02-11 ENCOUNTER — OUTPATIENT (OUTPATIENT)
Dept: OUTPATIENT SERVICES | Facility: HOSPITAL | Age: 57
LOS: 1 days | End: 2022-02-11
Payer: MEDICAID

## 2022-02-11 DIAGNOSIS — Z93.3 COLOSTOMY STATUS: Chronic | ICD-10-CM

## 2022-02-11 DIAGNOSIS — K57.32 DIVERTICULITIS OF LARGE INTESTINE WITHOUT PERFORATION OR ABSCESS WITHOUT BLEEDING: ICD-10-CM

## 2022-02-11 DIAGNOSIS — Z93.2 ILEOSTOMY STATUS: ICD-10-CM

## 2022-02-11 PROCEDURE — 74177 CT ABD & PELVIS W/CONTRAST: CPT

## 2022-02-11 PROCEDURE — 82565 ASSAY OF CREATININE: CPT

## 2022-02-11 PROCEDURE — 74177 CT ABD & PELVIS W/CONTRAST: CPT | Mod: 26

## 2022-02-16 ENCOUNTER — OUTPATIENT (OUTPATIENT)
Dept: OUTPATIENT SERVICES | Facility: HOSPITAL | Age: 57
LOS: 1 days | End: 2022-02-16
Payer: MEDICAID

## 2022-02-16 VITALS
OXYGEN SATURATION: 96 % | SYSTOLIC BLOOD PRESSURE: 152 MMHG | TEMPERATURE: 97 F | DIASTOLIC BLOOD PRESSURE: 94 MMHG | RESPIRATION RATE: 16 BRPM | HEIGHT: 69 IN | HEART RATE: 73 BPM | WEIGHT: 315 LBS

## 2022-02-16 DIAGNOSIS — Z29.9 ENCOUNTER FOR PROPHYLACTIC MEASURES, UNSPECIFIED: ICD-10-CM

## 2022-02-16 DIAGNOSIS — Z01.818 ENCOUNTER FOR OTHER PREPROCEDURAL EXAMINATION: ICD-10-CM

## 2022-02-16 DIAGNOSIS — Z93.2 ILEOSTOMY STATUS: ICD-10-CM

## 2022-02-16 DIAGNOSIS — Z98.890 OTHER SPECIFIED POSTPROCEDURAL STATES: Chronic | ICD-10-CM

## 2022-02-16 DIAGNOSIS — Z93.3 COLOSTOMY STATUS: Chronic | ICD-10-CM

## 2022-02-16 DIAGNOSIS — E66.01 MORBID (SEVERE) OBESITY DUE TO EXCESS CALORIES: ICD-10-CM

## 2022-02-16 LAB
A1C WITH ESTIMATED AVERAGE GLUCOSE RESULT: 10 % — HIGH (ref 4–5.6)
ALBUMIN SERPL ELPH-MCNC: 3.1 G/DL — LOW (ref 3.3–5)
ALP SERPL-CCNC: 120 U/L — SIGNIFICANT CHANGE UP (ref 40–120)
ALT FLD-CCNC: 44 U/L — SIGNIFICANT CHANGE UP (ref 12–78)
ANION GAP SERPL CALC-SCNC: 5 MMOL/L — SIGNIFICANT CHANGE UP (ref 5–17)
APTT BLD: 30.7 SEC — SIGNIFICANT CHANGE UP (ref 27.5–35.5)
AST SERPL-CCNC: 41 U/L — HIGH (ref 15–37)
BASOPHILS # BLD AUTO: 0.04 K/UL — SIGNIFICANT CHANGE UP (ref 0–0.2)
BASOPHILS NFR BLD AUTO: 0.4 % — SIGNIFICANT CHANGE UP (ref 0–2)
BILIRUB DIRECT SERPL-MCNC: 0.2 MG/DL — SIGNIFICANT CHANGE UP (ref 0–0.3)
BILIRUB INDIRECT FLD-MCNC: 0.5 MG/DL — SIGNIFICANT CHANGE UP (ref 0.2–1)
BILIRUB SERPL-MCNC: 0.7 MG/DL — SIGNIFICANT CHANGE UP (ref 0.2–1.2)
BUN SERPL-MCNC: 14 MG/DL — SIGNIFICANT CHANGE UP (ref 7–23)
CALCIUM SERPL-MCNC: 9 MG/DL — SIGNIFICANT CHANGE UP (ref 8.5–10.1)
CHLORIDE SERPL-SCNC: 101 MMOL/L — SIGNIFICANT CHANGE UP (ref 96–108)
CO2 SERPL-SCNC: 32 MMOL/L — HIGH (ref 22–31)
CREAT SERPL-MCNC: 1.04 MG/DL — SIGNIFICANT CHANGE UP (ref 0.5–1.3)
EOSINOPHIL # BLD AUTO: 0.18 K/UL — SIGNIFICANT CHANGE UP (ref 0–0.5)
EOSINOPHIL NFR BLD AUTO: 1.9 % — SIGNIFICANT CHANGE UP (ref 0–6)
ESTIMATED AVERAGE GLUCOSE: 240 MG/DL — HIGH (ref 68–114)
GLUCOSE SERPL-MCNC: 162 MG/DL — HIGH (ref 70–99)
HCT VFR BLD CALC: 43.4 % — SIGNIFICANT CHANGE UP (ref 39–50)
HGB BLD-MCNC: 13.2 G/DL — SIGNIFICANT CHANGE UP (ref 13–17)
IMM GRANULOCYTES NFR BLD AUTO: 0.3 % — SIGNIFICANT CHANGE UP (ref 0–1.5)
INR BLD: 1.07 RATIO — SIGNIFICANT CHANGE UP (ref 0.88–1.16)
LYMPHOCYTES # BLD AUTO: 1.01 K/UL — SIGNIFICANT CHANGE UP (ref 1–3.3)
LYMPHOCYTES # BLD AUTO: 10.9 % — LOW (ref 13–44)
MCHC RBC-ENTMCNC: 25 PG — LOW (ref 27–34)
MCHC RBC-ENTMCNC: 30.4 GM/DL — LOW (ref 32–36)
MCV RBC AUTO: 82.4 FL — SIGNIFICANT CHANGE UP (ref 80–100)
MONOCYTES # BLD AUTO: 0.57 K/UL — SIGNIFICANT CHANGE UP (ref 0–0.9)
MONOCYTES NFR BLD AUTO: 6.2 % — SIGNIFICANT CHANGE UP (ref 2–14)
NEUTROPHILS # BLD AUTO: 7.43 K/UL — HIGH (ref 1.8–7.4)
NEUTROPHILS NFR BLD AUTO: 80.3 % — HIGH (ref 43–77)
PLATELET # BLD AUTO: 298 K/UL — SIGNIFICANT CHANGE UP (ref 150–400)
POTASSIUM SERPL-MCNC: 3.9 MMOL/L — SIGNIFICANT CHANGE UP (ref 3.5–5.3)
POTASSIUM SERPL-SCNC: 3.9 MMOL/L — SIGNIFICANT CHANGE UP (ref 3.5–5.3)
PROT SERPL-MCNC: 7.7 GM/DL — SIGNIFICANT CHANGE UP (ref 6–8.3)
PROTHROM AB SERPL-ACNC: 12.5 SEC — SIGNIFICANT CHANGE UP (ref 10.6–13.6)
RBC # BLD: 5.27 M/UL — SIGNIFICANT CHANGE UP (ref 4.2–5.8)
RBC # FLD: 16.3 % — HIGH (ref 10.3–14.5)
SODIUM SERPL-SCNC: 138 MMOL/L — SIGNIFICANT CHANGE UP (ref 135–145)
WBC # BLD: 9.26 K/UL — SIGNIFICANT CHANGE UP (ref 3.8–10.5)
WBC # FLD AUTO: 9.26 K/UL — SIGNIFICANT CHANGE UP (ref 3.8–10.5)

## 2022-02-16 PROCEDURE — 36415 COLL VENOUS BLD VENIPUNCTURE: CPT

## 2022-02-16 PROCEDURE — G0463: CPT | Mod: 25

## 2022-02-16 PROCEDURE — 93005 ELECTROCARDIOGRAM TRACING: CPT

## 2022-02-16 PROCEDURE — 80076 HEPATIC FUNCTION PANEL: CPT

## 2022-02-16 PROCEDURE — 80048 BASIC METABOLIC PNL TOTAL CA: CPT

## 2022-02-16 PROCEDURE — 83036 HEMOGLOBIN GLYCOSYLATED A1C: CPT

## 2022-02-16 PROCEDURE — 85025 COMPLETE CBC W/AUTO DIFF WBC: CPT

## 2022-02-16 PROCEDURE — 85610 PROTHROMBIN TIME: CPT

## 2022-02-16 PROCEDURE — 86901 BLOOD TYPING SEROLOGIC RH(D): CPT

## 2022-02-16 PROCEDURE — 85730 THROMBOPLASTIN TIME PARTIAL: CPT

## 2022-02-16 PROCEDURE — 86850 RBC ANTIBODY SCREEN: CPT

## 2022-02-16 PROCEDURE — 93010 ELECTROCARDIOGRAM REPORT: CPT

## 2022-02-16 PROCEDURE — 86900 BLOOD TYPING SEROLOGIC ABO: CPT

## 2022-02-16 RX ORDER — RNA INGREDIENT BNT-162B2 0.23 G/1.8ML
0.3 INJECTION, SUSPENSION INTRAMUSCULAR
Qty: 0 | Refills: 0 | DISCHARGE

## 2022-02-16 NOTE — H&P PST ADULT - NSALCOHOLLASTUSE_GEN_A_CORE_DT
pt arriving to ED for c/c  fever, congestion and sore throat x 3 days. Denies n/v/d, chills, cough, CP or SOB. took Advil 2 days ago for fever. Afebrile at triage. 01-Jan-2022

## 2022-02-16 NOTE — H&P PST ADULT - ASSESSMENT
56 y.o male scheduled for  Laparoscopic possible Open Reversal of Ileostomy   Plan  1. Stop all NSAIDS, herbal supplements and vitamins for 7 days. Hold Metformin 24 hrs prior to surgery. Reduce Semglee dose by 20 % the night before surgery .   2. NPO at midnight.  3. Take the following medications Carvedilol  with small sips of water on the morning of your procedure/surgery.  4. Use EZ sponges as directed  5. Labs, EKG, CXR as per surgeon  6. PMD V. Sommer  visit for optimization prior to surgery as per surgeon  7. COVID swab appt: 2022    CAPRINI SCORE    AGE RELATED RISK FACTORS                                                       MOBILITY RELATED FACTORS  [x ] Age 41-60 years                                            (1 Point)                  [ ] Bed rest                                                        (1 Point)  [ ] Age: 61-74 years                                           (2 Points)                [ ] Plaster cast                                                   (2 Points)  [ ] Age= 75 years                                              (3 Points)                 [ ] Bed bound for more than 72 hours                   (2 Points)    DISEASE RELATED RISK FACTORS                                               GENDER SPECIFIC FACTORS  [x ] Edema in the lower extremities                       (1 Point)                  [ ] Pregnancy                                                     (1 Point)  [ ] Varicose veins                                               (1 Point)                  [ ] Post-partum < 6 weeks                                   (1 Point)             [ x] BMI > 25 Kg/m2                                            (1 Point)                  [ ] Hormonal therapy  or oral contraception            (1 Point)                 [ ] Sepsis (in the previous month)                        (1 Point)                  [ ] History of pregnancy complications  [ ] Pneumonia or serious lung disease                                               [ ] Unexplained or recurrent                       (1 Point)           (in the previous month)                               (1 Point)  [ ] Abnormal pulmonary function test                     (1 Point)                 SURGERY RELATED RISK FACTORS  [ ] Acute myocardial infarction                              (1 Point)                 [ ]  Section                                            (1 Point)  [ ] Congestive heart failure (in the previous month)  (1 Point)                 [ ] Minor surgery                                                 (1 Point)   [ ] Inflammatory bowel disease                             (1 Point)                 [ ] Arthroscopic surgery                                        (2 Points)  [ ] Central venous access                                    (2 Points)                [x ] General surgery lasting more than 45 minutes   (2 Points)       [ ] Stroke (in the previous month)                          (5 Points)               [ ] Elective arthroplasty                                        (5 Points)                                                                                                                                               HEMATOLOGY RELATED FACTORS                                                 TRAUMA RELATED RISK FACTORS  [ ] Prior episodes of VTE                                     (3 Points)                 [ ] Fracture of the hip, pelvis, or leg                       (5 Points)  [ ] Positive family history for VTE                         (3 Points)                 [ ] Acute spinal cord injury (in the previous month)  (5 Points)  [ ] Prothrombin  A                                      (3 Points)                 [ ] Paralysis  (less than 1 month)                          (5 Points)  [ ] Factor V Leiden                                             (3 Points)                 [ ] Multiple Trauma within 1 month                         (5 Points)  [ ] Lupus anticoagulants                                     (3 Points)                                                           [ ] Anticardiolipin antibodies                                (3 Points)                                                       [ ] High homocysteine in the blood                      (3 Points)                                             [ ] Other congenital or acquired thrombophilia       (3 Points)                                                [ ] Heparin induced thrombocytopenia                  (3 Points)                                          Total Score [     5     ]    The Caprini score indicates this patient is at risk for a VTE event (score 3-5).  Most surgical patients in this group would benefit from pharmacologic prophylaxis.  The surgical team will determine the balance between VTE risk and bleeding risk

## 2022-02-16 NOTE — H&P PST ADULT - HISTORY OF PRESENT ILLNESS
56 y.o morbidly obese male presents to PST with hx of chronic diverticulitis. He reports multiple exacerbations over the last few years requiring surgery, colostomy  then ileostomy & sigmoid colectomy  in November 2021. He has followed with surgeon and now scheduled for Laparoscopic possible Open Reversal of Ileostomy

## 2022-02-16 NOTE — H&P PST ADULT - NSICDXPASTSURGICALHX_GEN_ALL_CORE_FT
PAST SURGICAL HISTORY:  History of colon surgery Open sigmoid resection, ileostomy --11/2021    S/P colostomy left 7/2021

## 2022-02-16 NOTE — H&P PST ADULT - RS GEN PE MLT RESP DETAILS PC
breath sounds equal/respirations non-labored/no rales/no rhonchi/no wheezes/diminished breath sounds, L/diminished breath sounds, R

## 2022-02-16 NOTE — H&P PST ADULT - NSICDXPASTMEDICALHX_GEN_ALL_CORE_FT
PAST MEDICAL HISTORY:  Current smoker 1/4 pk x 30 yrs    Diabetes     Diverticulitis     HTN (hypertension)     Neuropathy     Obesity, morbid, BMI 40.0-49.9     S/P colostomy left, 7/2021    ileostomy - 11/2021

## 2022-02-17 DIAGNOSIS — E66.01 MORBID (SEVERE) OBESITY DUE TO EXCESS CALORIES: ICD-10-CM

## 2022-02-17 DIAGNOSIS — Z93.2 ILEOSTOMY STATUS: ICD-10-CM

## 2022-02-17 DIAGNOSIS — Z01.818 ENCOUNTER FOR OTHER PREPROCEDURAL EXAMINATION: ICD-10-CM

## 2022-02-18 ENCOUNTER — NON-APPOINTMENT (OUTPATIENT)
Age: 57
End: 2022-02-18

## 2022-04-13 PROBLEM — Z93.3 COLOSTOMY STATUS: Chronic | Status: ACTIVE | Noted: 2021-10-20

## 2022-04-13 PROBLEM — E66.01 MORBID (SEVERE) OBESITY DUE TO EXCESS CALORIES: Chronic | Status: ACTIVE | Noted: 2022-02-16

## 2022-04-13 PROBLEM — F17.200 NICOTINE DEPENDENCE, UNSPECIFIED, UNCOMPLICATED: Chronic | Status: ACTIVE | Noted: 2022-02-16

## 2022-04-13 PROBLEM — G62.9 POLYNEUROPATHY, UNSPECIFIED: Chronic | Status: ACTIVE | Noted: 2022-02-16

## 2022-05-10 ENCOUNTER — APPOINTMENT (OUTPATIENT)
Dept: COLORECTAL SURGERY | Facility: CLINIC | Age: 57
End: 2022-05-10
Payer: MEDICAID

## 2022-05-10 ENCOUNTER — NON-APPOINTMENT (OUTPATIENT)
Age: 57
End: 2022-05-10

## 2022-05-10 DIAGNOSIS — K57.32 DIVERTICULITIS OF LARGE INTESTINE W/OUT PERFORATION OR ABSCESS W/OUT BLEEDING: ICD-10-CM

## 2022-05-10 DIAGNOSIS — E11.9 TYPE 2 DIABETES MELLITUS W/OUT COMPLICATIONS: ICD-10-CM

## 2022-05-10 DIAGNOSIS — I50.32 CHRONIC DIASTOLIC (CONGESTIVE) HEART FAILURE: ICD-10-CM

## 2022-05-10 PROCEDURE — 99215 OFFICE O/P EST HI 40 MIN: CPT

## 2022-05-11 LAB
ANION GAP SERPL CALC-SCNC: 10 MMOL/L
BUN SERPL-MCNC: 21 MG/DL
CALCIUM SERPL-MCNC: 9.7 MG/DL
CHLORIDE SERPL-SCNC: 99 MMOL/L
CO2 SERPL-SCNC: 30 MMOL/L
CREAT SERPL-MCNC: 1.18 MG/DL
EGFR: 73 ML/MIN/1.73M2
ESTIMATED AVERAGE GLUCOSE: 212 MG/DL
GLUCOSE SERPL-MCNC: 141 MG/DL
HBA1C MFR BLD HPLC: 9 %
POTASSIUM SERPL-SCNC: 4.5 MMOL/L
SODIUM SERPL-SCNC: 138 MMOL/L

## 2022-05-11 RX ORDER — AMOXICILLIN AND CLAVULANATE POTASSIUM 875; 125 MG/1; MG/1
875-125 TABLET, COATED ORAL TWICE DAILY
Qty: 14 | Refills: 0 | Status: DISCONTINUED | COMMUNITY
Start: 2021-11-29 | End: 2022-05-11

## 2022-05-11 RX ORDER — OXYCODONE AND ACETAMINOPHEN 5; 325 MG/1; MG/1
5-325 TABLET ORAL
Qty: 20 | Refills: 0 | Status: DISCONTINUED | COMMUNITY
Start: 2021-11-19 | End: 2022-05-11

## 2022-05-17 PROBLEM — K57.32 DIVERTICULITIS, COLON: Status: ACTIVE | Noted: 2021-09-07

## 2022-05-17 PROBLEM — I50.32 CHRONIC DIASTOLIC HEART FAILURE: Status: ACTIVE | Noted: 2019-01-18

## 2022-05-17 PROBLEM — E11.9 TYPE 2 DIABETES MELLITUS: Status: ACTIVE | Noted: 2022-05-10

## 2022-05-17 NOTE — HISTORY OF PRESENT ILLNESS
[FreeTextEntry1] : 56-year-old male morbidly obese post sigmoid colon resection for extensive diverticulitis with diverting ileostomy.He is feeling much better and wishes to have his ileostomy reversed.he was scheduled several months ago however was found to have a very elevated hemoglobin A1c level and surgery need to be postponed for him to get back down. states he's been doing his best to diet and lose weight

## 2022-05-17 NOTE — PHYSICAL EXAM
[Abdomen Masses] : No abdominal masses [Abdomen Tenderness] : ~T No ~M abdominal tenderness [Tender] : nontender [JVD] : no jugular venous distention  [Normal Breath Sounds] : Normal breath sounds [Normal Heart Sounds] : normal heart sounds [Normal Rate and Rhythm] : normal rate and rhythm [No Rash or Lesion] : No rash or lesion [Alert] : alert [Oriented to Person] : oriented to person [Oriented to Place] : oriented to place [Oriented to Time] : oriented to time [Calm] : calm [de-identified] : Obese abdomen with right lower quadrant ileostomy, Incisions are healed [de-identified] : Looks well in no distress, of stated age. [de-identified] : pupils equal reactive to light normocephalic atraumatic. [de-identified] : moves all 4 extremities appropriately with 5 over 5 strength

## 2022-05-20 ENCOUNTER — OUTPATIENT (OUTPATIENT)
Dept: OUTPATIENT SERVICES | Facility: HOSPITAL | Age: 57
LOS: 1 days | End: 2022-05-20
Payer: MEDICAID

## 2022-05-20 VITALS
HEIGHT: 70 IN | OXYGEN SATURATION: 96 % | DIASTOLIC BLOOD PRESSURE: 78 MMHG | TEMPERATURE: 98 F | WEIGHT: 315 LBS | SYSTOLIC BLOOD PRESSURE: 131 MMHG | RESPIRATION RATE: 16 BRPM | HEART RATE: 60 BPM

## 2022-05-20 DIAGNOSIS — Z98.890 OTHER SPECIFIED POSTPROCEDURAL STATES: Chronic | ICD-10-CM

## 2022-05-20 DIAGNOSIS — Z93.2 ILEOSTOMY STATUS: ICD-10-CM

## 2022-05-20 DIAGNOSIS — Z01.818 ENCOUNTER FOR OTHER PREPROCEDURAL EXAMINATION: ICD-10-CM

## 2022-05-20 DIAGNOSIS — Z93.3 COLOSTOMY STATUS: Chronic | ICD-10-CM

## 2022-05-20 LAB
A1C WITH ESTIMATED AVERAGE GLUCOSE RESULT: 8.9 % — HIGH (ref 4–5.6)
ALBUMIN SERPL ELPH-MCNC: 3.3 G/DL — SIGNIFICANT CHANGE UP (ref 3.3–5)
ALP SERPL-CCNC: 110 U/L — SIGNIFICANT CHANGE UP (ref 40–120)
ALT FLD-CCNC: 42 U/L — SIGNIFICANT CHANGE UP (ref 12–78)
ANION GAP SERPL CALC-SCNC: 3 MMOL/L — LOW (ref 5–17)
APTT BLD: 29.6 SEC — SIGNIFICANT CHANGE UP (ref 27.5–35.5)
AST SERPL-CCNC: 26 U/L — SIGNIFICANT CHANGE UP (ref 15–37)
BASOPHILS # BLD AUTO: 0.03 K/UL — SIGNIFICANT CHANGE UP (ref 0–0.2)
BASOPHILS NFR BLD AUTO: 0.3 % — SIGNIFICANT CHANGE UP (ref 0–2)
BILIRUB SERPL-MCNC: 0.4 MG/DL — SIGNIFICANT CHANGE UP (ref 0.2–1.2)
BUN SERPL-MCNC: 17 MG/DL — SIGNIFICANT CHANGE UP (ref 7–23)
CALCIUM SERPL-MCNC: 9 MG/DL — SIGNIFICANT CHANGE UP (ref 8.5–10.1)
CHLORIDE SERPL-SCNC: 108 MMOL/L — SIGNIFICANT CHANGE UP (ref 96–108)
CO2 SERPL-SCNC: 27 MMOL/L — SIGNIFICANT CHANGE UP (ref 22–31)
CREAT SERPL-MCNC: 1.07 MG/DL — SIGNIFICANT CHANGE UP (ref 0.5–1.3)
EGFR: 82 ML/MIN/1.73M2 — SIGNIFICANT CHANGE UP
EOSINOPHIL # BLD AUTO: 0.2 K/UL — SIGNIFICANT CHANGE UP (ref 0–0.5)
EOSINOPHIL NFR BLD AUTO: 2.3 % — SIGNIFICANT CHANGE UP (ref 0–6)
ESTIMATED AVERAGE GLUCOSE: 209 MG/DL — HIGH (ref 68–114)
GLUCOSE SERPL-MCNC: 134 MG/DL — HIGH (ref 70–99)
HCT VFR BLD CALC: 42.3 % — SIGNIFICANT CHANGE UP (ref 39–50)
HGB BLD-MCNC: 13.1 G/DL — SIGNIFICANT CHANGE UP (ref 13–17)
IMM GRANULOCYTES NFR BLD AUTO: 0.5 % — SIGNIFICANT CHANGE UP (ref 0–1.5)
INR BLD: 0.99 RATIO — SIGNIFICANT CHANGE UP (ref 0.88–1.16)
LYMPHOCYTES # BLD AUTO: 0.84 K/UL — LOW (ref 1–3.3)
LYMPHOCYTES # BLD AUTO: 9.8 % — LOW (ref 13–44)
MCHC RBC-ENTMCNC: 25.3 PG — LOW (ref 27–34)
MCHC RBC-ENTMCNC: 31 GM/DL — LOW (ref 32–36)
MCV RBC AUTO: 81.7 FL — SIGNIFICANT CHANGE UP (ref 80–100)
MONOCYTES # BLD AUTO: 0.61 K/UL — SIGNIFICANT CHANGE UP (ref 0–0.9)
MONOCYTES NFR BLD AUTO: 7.1 % — SIGNIFICANT CHANGE UP (ref 2–14)
NEUTROPHILS # BLD AUTO: 6.89 K/UL — SIGNIFICANT CHANGE UP (ref 1.8–7.4)
NEUTROPHILS NFR BLD AUTO: 80 % — HIGH (ref 43–77)
PLATELET # BLD AUTO: 260 K/UL — SIGNIFICANT CHANGE UP (ref 150–400)
POTASSIUM SERPL-MCNC: 4.3 MMOL/L — SIGNIFICANT CHANGE UP (ref 3.5–5.3)
POTASSIUM SERPL-SCNC: 4.3 MMOL/L — SIGNIFICANT CHANGE UP (ref 3.5–5.3)
PROT SERPL-MCNC: 7.5 GM/DL — SIGNIFICANT CHANGE UP (ref 6–8.3)
PROTHROM AB SERPL-ACNC: 11.5 SEC — SIGNIFICANT CHANGE UP (ref 10.5–13.4)
RBC # BLD: 5.18 M/UL — SIGNIFICANT CHANGE UP (ref 4.2–5.8)
RBC # FLD: 19 % — HIGH (ref 10.3–14.5)
SODIUM SERPL-SCNC: 138 MMOL/L — SIGNIFICANT CHANGE UP (ref 135–145)
WBC # BLD: 8.61 K/UL — SIGNIFICANT CHANGE UP (ref 3.8–10.5)
WBC # FLD AUTO: 8.61 K/UL — SIGNIFICANT CHANGE UP (ref 3.8–10.5)

## 2022-05-20 PROCEDURE — 86850 RBC ANTIBODY SCREEN: CPT

## 2022-05-20 PROCEDURE — 85025 COMPLETE CBC W/AUTO DIFF WBC: CPT

## 2022-05-20 PROCEDURE — 85610 PROTHROMBIN TIME: CPT

## 2022-05-20 PROCEDURE — 85730 THROMBOPLASTIN TIME PARTIAL: CPT

## 2022-05-20 PROCEDURE — 86901 BLOOD TYPING SEROLOGIC RH(D): CPT

## 2022-05-20 PROCEDURE — 36415 COLL VENOUS BLD VENIPUNCTURE: CPT

## 2022-05-20 PROCEDURE — 86900 BLOOD TYPING SEROLOGIC ABO: CPT

## 2022-05-20 PROCEDURE — 83036 HEMOGLOBIN GLYCOSYLATED A1C: CPT

## 2022-05-20 PROCEDURE — 80053 COMPREHEN METABOLIC PANEL: CPT

## 2022-05-20 RX ORDER — CARVEDILOL PHOSPHATE 80 MG/1
1 CAPSULE, EXTENDED RELEASE ORAL
Qty: 0 | Refills: 0 | DISCHARGE

## 2022-05-20 NOTE — H&P PST ADULT - NSICDXFAMILYHX_GEN_ALL_CORE_FT
FAMILY HISTORY:  Father  Still living? Unknown  Family history of diabetes mellitus (DM), Age at diagnosis: Age Unknown  FH: HTN (hypertension), Age at diagnosis: Age Unknown    Mother  Still living? Unknown  Family history of diabetes mellitus (DM), Age at diagnosis: Age Unknown    Sibling  Still living? Unknown  Family history of diabetes mellitus (DM), Age at diagnosis: Age Unknown

## 2022-05-20 NOTE — H&P PST ADULT - HISTORY OF PRESENT ILLNESS
56 y.o morbidly obese male presents to PST with hx of chronic diverticulitis. He reports multiple exacerbations over the last few years requiring surgery, colostomy  then ileostomy & sigmoid colectomy  in November 2021. He has followed with surgeon and now scheduled for Laparoscopic possible Open Reversal of Ileostomy     pt was scheduled for surgery in February, 2022, surgery was cancelled due to elevated A1C >9, reports started on [facility], states -130

## 2022-05-20 NOTE — H&P PST ADULT - PSYCHIATRIC
How Severe Is Your Acne?: moderate Is This A New Presentation, Or A Follow-Up?: Acne details… detailed exam

## 2022-05-20 NOTE — H&P PST ADULT - NEGATIVE GENERAL GENITOURINARY SYMPTOMS
no hematuria/no renal colic/no flank pain L/no flank pain R/no incontinence/no dysuria/no urinary hesitancy

## 2022-05-20 NOTE — H&P PST ADULT - VENOUS THROMBOEMBOLISM CURRENT STATUS
reviewed hospital records, imaging and labs, called [facility] and altered needs urgent consult and placed referral in chart, will cont FU with Dr Russo next week and home healthy today, cont amlodipine 10 mg and restart lasix 20 mg daily and check BP at home and monitor swelling, call if remains uncontrolled  
(1) other risk factor (includes escalating BMI, pack-years of smoking, diabetes requiring insulin, chemotherapy, female gender and length of surgery)

## 2022-05-20 NOTE — H&P PST ADULT - PROBLEM SELECTOR PLAN 1
Plan:  pt scheduled for Laparoscopic possible Open Reversal of Ileostomy on 05/26/22   NPO post midnight   Pt instructed to take the following meds with sip of water: Carvedilol   Labs EKG CXR ordered as per surgeon request   s/p Medical Evaluation with Dr Sommer on 05/19/22   Stop NSAIDS, aspirin, aleve, motrin, multivitamins, vitamin E, fish oil, herbal supplements 7 days preop   EZ wash  instructions given   Covid test scheduled on 05/23/22 at [facility]   pt instructed to hold metformin, trulictiy, admelog on am of the procedure and to decrease segmlee by 20 % the night before the procedure -use 40 units    RAFFI precautions. Pt with >3 criteria on STOP-Bang Questionnaire.

## 2022-05-20 NOTE — H&P PST ADULT - ASSESSMENT
56 y.o. male with preop diagnosis of ileostomy, morbid obesity, perforated diverticulitis    CAPRINI SCORE [CLOT]    AGE RELATED RISK FACTORS                                                       MOBILITY RELATED FACTORS  [ x] Age 41-60 years                                            (1 Point)                  [ ] Bed rest                                                        (1 Point)  [ ] Age: 61-74 years                                           (2 Points)                 [ ] Plaster cast                                                   (2 Points)  [ ] Age= 75 years                                              (3 Points)                 [ ] Bed bound for more than 72 hours                 (2 Points)    DISEASE RELATED RISK FACTORS                                               GENDER SPECIFIC FACTORS  [x ] Edema in the lower extremities                       (1 Point)                  [ ] Pregnancy                                                     (1 Point)  [ ] Varicose veins                                               (1 Point)                  [ ] Post-partum < 6 weeks                                   (1 Point)             [x ] BMI > 25 Kg/m2                                            (1 Point)                  [ ] Hormonal therapy  or oral contraception          (1 Point)                 [ ] Sepsis (in the previous month)                        (1 Point)                  [ ] History of pregnancy complications                 (1 point)  [ ] Pneumonia or serious lung disease                                               [ ] Unexplained or recurrent                     (1 Point)           (in the previous month)                               (1 Point)  [ ] Abnormal pulmonary function test                     (1 Point)                 SURGERY RELATED RISK FACTORS  [ ] Acute myocardial infarction                              (1 Point)                 [ ]  Section                                             (1 Point)  [ ] Congestive heart failure (in the previous month)  (1 Point)               [ ] Minor surgery                                                  (1 Point)   [ ] Inflammatory bowel disease                             (1 Point)                 [ ] Arthroscopic surgery                                        (2 Points)  [ ] Central venous access                                      (2 Points)                [x ] General surgery lasting more than 45 minutes   (2 Points)       [ ] Stroke (in the previous month)                          (5 Points)               [ ] Elective arthroplasty                                         (5 Points)                                                                                                                                               HEMATOLOGY RELATED FACTORS                                                 TRAUMA RELATED RISK FACTORS  [ ] Prior episodes of VTE                                     (3 Points)                [ ] Fracture of the hip, pelvis, or leg                       (5 Points)  [ ] Positive family history for VTE                         (3 Points)                 [ ] Acute spinal cord injury (in the previous month)  (5 Points)  [ ] Prothrombin 89553 A                                     (3 Points)                 [ ] Paralysis  (less than 1 month)                             (5 Points)  [ ] Factor V Leiden                                             (3 Points)                  [ ] Multiple Trauma within 1 month                        (5 Points)  [ ] Lupus anticoagulants                                     (3 Points)                                                           [ ] Anticardiolipin antibodies                               (3 Points)                                                       [ ] High homocysteine in the blood                      (3 Points)                                             [ ] Other congenital or acquired thrombophilia      (3 Points)                                                [ ] Heparin induced thrombocytopenia                  (3 Points)                                          Total Score [     5     ]    Caprini Score 0 - 2:  Low Risk, No VTE Prophylaxis required for most patients, encourage ambulation  Caprini Score 3 - 6:  At Risk, pharmacologic VTE prophylaxis is indicated for most patients (in the absence of a contraindication)  Caprini Score Greater than or = 7:  High Risk, pharmacologic VTE prophylaxis is indicated for most patients (in the absence of a contraindication)

## 2022-05-20 NOTE — H&P PST ADULT - NSICDXPASTSURGICALHX_GEN_ALL_CORE_FT
PAST SURGICAL HISTORY:  History of colon surgery Open sigmoid resection, ileostomy --11/2021    S/P colostomy left 7/2021,

## 2022-05-21 DIAGNOSIS — Z01.818 ENCOUNTER FOR OTHER PREPROCEDURAL EXAMINATION: ICD-10-CM

## 2022-05-21 DIAGNOSIS — Z93.2 ILEOSTOMY STATUS: ICD-10-CM

## 2022-05-25 ENCOUNTER — TRANSCRIPTION ENCOUNTER (OUTPATIENT)
Age: 57
End: 2022-05-25

## 2022-05-25 ENCOUNTER — INPATIENT (INPATIENT)
Facility: HOSPITAL | Age: 57
LOS: 2 days | Discharge: HOME | DRG: 223 | End: 2022-05-28
Attending: COLON & RECTAL SURGERY | Admitting: COLON & RECTAL SURGERY
Payer: MEDICAID

## 2022-05-25 ENCOUNTER — APPOINTMENT (OUTPATIENT)
Dept: COLORECTAL SURGERY | Facility: HOSPITAL | Age: 57
End: 2022-05-25
Payer: MEDICAID

## 2022-05-25 ENCOUNTER — RESULT REVIEW (OUTPATIENT)
Age: 57
End: 2022-05-25

## 2022-05-25 VITALS
HEART RATE: 69 BPM | RESPIRATION RATE: 18 BRPM | WEIGHT: 315 LBS | TEMPERATURE: 97 F | SYSTOLIC BLOOD PRESSURE: 152 MMHG | DIASTOLIC BLOOD PRESSURE: 82 MMHG | OXYGEN SATURATION: 95 % | HEIGHT: 69 IN

## 2022-05-25 DIAGNOSIS — Z93.2 ILEOSTOMY STATUS: ICD-10-CM

## 2022-05-25 DIAGNOSIS — Z98.890 OTHER SPECIFIED POSTPROCEDURAL STATES: Chronic | ICD-10-CM

## 2022-05-25 DIAGNOSIS — Z93.3 COLOSTOMY STATUS: Chronic | ICD-10-CM

## 2022-05-25 DIAGNOSIS — E66.01 MORBID (SEVERE) OBESITY DUE TO EXCESS CALORIES: ICD-10-CM

## 2022-05-25 PROCEDURE — 44227 LAP CLOSE ENTEROSTOMY: CPT | Mod: AS

## 2022-05-25 PROCEDURE — 97163 PT EVAL HIGH COMPLEX 45 MIN: CPT | Mod: GP

## 2022-05-25 PROCEDURE — 88302 TISSUE EXAM BY PATHOLOGIST: CPT | Mod: 26

## 2022-05-25 PROCEDURE — 44227 LAP CLOSE ENTEROSTOMY: CPT

## 2022-05-25 PROCEDURE — 36600 WITHDRAWAL OF ARTERIAL BLOOD: CPT

## 2022-05-25 PROCEDURE — 88304 TISSUE EXAM BY PATHOLOGIST: CPT

## 2022-05-25 PROCEDURE — 82803 BLOOD GASES ANY COMBINATION: CPT

## 2022-05-25 PROCEDURE — 88302 TISSUE EXAM BY PATHOLOGIST: CPT

## 2022-05-25 PROCEDURE — 36415 COLL VENOUS BLD VENIPUNCTURE: CPT

## 2022-05-25 PROCEDURE — 80048 BASIC METABOLIC PNL TOTAL CA: CPT

## 2022-05-25 PROCEDURE — 85027 COMPLETE CBC AUTOMATED: CPT

## 2022-05-25 PROCEDURE — 82962 GLUCOSE BLOOD TEST: CPT

## 2022-05-25 PROCEDURE — 83735 ASSAY OF MAGNESIUM: CPT

## 2022-05-25 PROCEDURE — 84100 ASSAY OF PHOSPHORUS: CPT

## 2022-05-25 PROCEDURE — 94660 CPAP INITIATION&MGMT: CPT

## 2022-05-25 PROCEDURE — C1889: CPT

## 2022-05-25 PROCEDURE — 97116 GAIT TRAINING THERAPY: CPT | Mod: GP

## 2022-05-25 PROCEDURE — 88304 TISSUE EXAM BY PATHOLOGIST: CPT | Mod: 26

## 2022-05-25 RX ORDER — INSULIN GLARGINE 100 [IU]/ML
50 INJECTION, SOLUTION SUBCUTANEOUS
Qty: 0 | Refills: 0 | DISCHARGE

## 2022-05-25 RX ORDER — FENTANYL CITRATE 50 UG/ML
50 INJECTION INTRAVENOUS
Refills: 0 | Status: DISCONTINUED | OUTPATIENT
Start: 2022-05-25 | End: 2022-05-25

## 2022-05-25 RX ORDER — GABAPENTIN 400 MG/1
0 CAPSULE ORAL
Qty: 0 | Refills: 0 | DISCHARGE

## 2022-05-25 RX ORDER — ACETAMINOPHEN 500 MG
650 TABLET ORAL EVERY 6 HOURS
Refills: 0 | Status: DISCONTINUED | OUTPATIENT
Start: 2022-05-25 | End: 2022-05-28

## 2022-05-25 RX ORDER — ASPIRIN/CALCIUM CARB/MAGNESIUM 324 MG
81 TABLET ORAL DAILY
Refills: 0 | Status: DISCONTINUED | OUTPATIENT
Start: 2022-05-25 | End: 2022-05-28

## 2022-05-25 RX ORDER — CARVEDILOL PHOSPHATE 80 MG/1
2 CAPSULE, EXTENDED RELEASE ORAL
Qty: 0 | Refills: 0 | DISCHARGE

## 2022-05-25 RX ORDER — DEXTROSE 50 % IN WATER 50 %
15 SYRINGE (ML) INTRAVENOUS ONCE
Refills: 0 | Status: DISCONTINUED | OUTPATIENT
Start: 2022-05-25 | End: 2022-05-28

## 2022-05-25 RX ORDER — KETOROLAC TROMETHAMINE 30 MG/ML
15 SYRINGE (ML) INJECTION EVERY 6 HOURS
Refills: 0 | Status: DISCONTINUED | OUTPATIENT
Start: 2022-05-25 | End: 2022-05-26

## 2022-05-25 RX ORDER — INSULIN LISPRO 100/ML
VIAL (ML) SUBCUTANEOUS
Refills: 0 | Status: DISCONTINUED | OUTPATIENT
Start: 2022-05-25 | End: 2022-05-28

## 2022-05-25 RX ORDER — ALPRAZOLAM 0.25 MG
2 TABLET ORAL
Refills: 0 | Status: DISCONTINUED | OUTPATIENT
Start: 2022-05-25 | End: 2022-05-26

## 2022-05-25 RX ORDER — ENOXAPARIN SODIUM 100 MG/ML
40 INJECTION SUBCUTANEOUS EVERY 24 HOURS
Refills: 0 | Status: DISCONTINUED | OUTPATIENT
Start: 2022-05-26 | End: 2022-05-28

## 2022-05-25 RX ORDER — TAMSULOSIN HYDROCHLORIDE 0.4 MG/1
0.4 CAPSULE ORAL AT BEDTIME
Refills: 0 | Status: DISCONTINUED | OUTPATIENT
Start: 2022-05-25 | End: 2022-05-28

## 2022-05-25 RX ORDER — OXYCODONE HYDROCHLORIDE 5 MG/1
10 TABLET ORAL EVERY 4 HOURS
Refills: 0 | Status: DISCONTINUED | OUTPATIENT
Start: 2022-05-25 | End: 2022-05-26

## 2022-05-25 RX ORDER — DEXTROSE 50 % IN WATER 50 %
25 SYRINGE (ML) INTRAVENOUS ONCE
Refills: 0 | Status: DISCONTINUED | OUTPATIENT
Start: 2022-05-25 | End: 2022-05-28

## 2022-05-25 RX ORDER — SODIUM CHLORIDE 9 MG/ML
1000 INJECTION, SOLUTION INTRAVENOUS
Refills: 0 | Status: DISCONTINUED | OUTPATIENT
Start: 2022-05-25 | End: 2022-05-28

## 2022-05-25 RX ORDER — ALVIMOPAN 12 MG/1
12 CAPSULE ORAL
Refills: 0 | Status: DISCONTINUED | OUTPATIENT
Start: 2022-05-26 | End: 2022-05-27

## 2022-05-25 RX ORDER — ALVIMOPAN 12 MG/1
12 CAPSULE ORAL ONCE
Refills: 0 | Status: COMPLETED | OUTPATIENT
Start: 2022-05-25 | End: 2022-05-25

## 2022-05-25 RX ORDER — FINASTERIDE 5 MG/1
1 TABLET, FILM COATED ORAL
Qty: 0 | Refills: 0 | DISCHARGE

## 2022-05-25 RX ORDER — FINASTERIDE 5 MG/1
5 TABLET, FILM COATED ORAL DAILY
Refills: 0 | Status: DISCONTINUED | OUTPATIENT
Start: 2022-05-25 | End: 2022-05-28

## 2022-05-25 RX ORDER — GABAPENTIN 400 MG/1
400 CAPSULE ORAL
Refills: 0 | Status: DISCONTINUED | OUTPATIENT
Start: 2022-05-25 | End: 2022-05-28

## 2022-05-25 RX ORDER — ONDANSETRON 8 MG/1
4 TABLET, FILM COATED ORAL EVERY 6 HOURS
Refills: 0 | Status: DISCONTINUED | OUTPATIENT
Start: 2022-05-25 | End: 2022-05-28

## 2022-05-25 RX ORDER — ASPIRIN/CALCIUM CARB/MAGNESIUM 324 MG
1 TABLET ORAL
Qty: 0 | Refills: 0 | DISCHARGE

## 2022-05-25 RX ORDER — SODIUM CHLORIDE 9 MG/ML
1000 INJECTION, SOLUTION INTRAVENOUS
Refills: 0 | Status: DISCONTINUED | OUTPATIENT
Start: 2022-05-25 | End: 2022-05-25

## 2022-05-25 RX ORDER — ACETAMINOPHEN 500 MG
1000 TABLET ORAL ONCE
Refills: 0 | Status: COMPLETED | OUTPATIENT
Start: 2022-05-25 | End: 2022-05-25

## 2022-05-25 RX ORDER — DEXTROSE 50 % IN WATER 50 %
12.5 SYRINGE (ML) INTRAVENOUS ONCE
Refills: 0 | Status: DISCONTINUED | OUTPATIENT
Start: 2022-05-25 | End: 2022-05-28

## 2022-05-25 RX ORDER — SODIUM CHLORIDE 9 MG/ML
1000 INJECTION, SOLUTION INTRAVENOUS
Refills: 0 | Status: DISCONTINUED | OUTPATIENT
Start: 2022-05-25 | End: 2022-05-27

## 2022-05-25 RX ORDER — METFORMIN HYDROCHLORIDE 850 MG/1
1 TABLET ORAL
Qty: 0 | Refills: 0 | DISCHARGE

## 2022-05-25 RX ORDER — FUROSEMIDE 40 MG
1 TABLET ORAL
Qty: 0 | Refills: 0 | DISCHARGE

## 2022-05-25 RX ORDER — HEPARIN SODIUM 5000 [USP'U]/ML
5000 INJECTION INTRAVENOUS; SUBCUTANEOUS ONCE
Refills: 0 | Status: COMPLETED | OUTPATIENT
Start: 2022-05-25 | End: 2022-05-25

## 2022-05-25 RX ORDER — CARVEDILOL PHOSPHATE 80 MG/1
25 CAPSULE, EXTENDED RELEASE ORAL EVERY 12 HOURS
Refills: 0 | Status: DISCONTINUED | OUTPATIENT
Start: 2022-05-25 | End: 2022-05-28

## 2022-05-25 RX ORDER — INSULIN LISPRO 100/ML
0 VIAL (ML) SUBCUTANEOUS
Qty: 0 | Refills: 0 | DISCHARGE

## 2022-05-25 RX ORDER — GLUCAGON INJECTION, SOLUTION 0.5 MG/.1ML
1 INJECTION, SOLUTION SUBCUTANEOUS ONCE
Refills: 0 | Status: DISCONTINUED | OUTPATIENT
Start: 2022-05-25 | End: 2022-05-28

## 2022-05-25 RX ORDER — TAMSULOSIN HYDROCHLORIDE 0.4 MG/1
1 CAPSULE ORAL
Qty: 0 | Refills: 0 | DISCHARGE

## 2022-05-25 RX ORDER — DULAGLUTIDE 4.5 MG/.5ML
0 INJECTION, SOLUTION SUBCUTANEOUS
Qty: 0 | Refills: 0 | DISCHARGE

## 2022-05-25 RX ORDER — OXYCODONE HYDROCHLORIDE 5 MG/1
5 TABLET ORAL EVERY 4 HOURS
Refills: 0 | Status: DISCONTINUED | OUTPATIENT
Start: 2022-05-25 | End: 2022-05-26

## 2022-05-25 RX ORDER — ONDANSETRON 8 MG/1
4 TABLET, FILM COATED ORAL ONCE
Refills: 0 | Status: DISCONTINUED | OUTPATIENT
Start: 2022-05-25 | End: 2022-05-25

## 2022-05-25 RX ORDER — CEFOTETAN DISODIUM 1 G
2 VIAL (EA) INJECTION ONCE
Refills: 0 | Status: COMPLETED | OUTPATIENT
Start: 2022-05-26 | End: 2022-05-26

## 2022-05-25 RX ORDER — ATORVASTATIN CALCIUM 80 MG/1
40 TABLET, FILM COATED ORAL AT BEDTIME
Refills: 0 | Status: DISCONTINUED | OUTPATIENT
Start: 2022-05-25 | End: 2022-05-28

## 2022-05-25 RX ORDER — ATORVASTATIN CALCIUM 80 MG/1
1 TABLET, FILM COATED ORAL
Qty: 0 | Refills: 0 | DISCHARGE

## 2022-05-25 RX ADMIN — GABAPENTIN 400 MILLIGRAM(S): 400 CAPSULE ORAL at 22:49

## 2022-05-25 RX ADMIN — Medication 15 MILLIGRAM(S): at 23:19

## 2022-05-25 RX ADMIN — SODIUM CHLORIDE 75 MILLILITER(S): 9 INJECTION, SOLUTION INTRAVENOUS at 18:24

## 2022-05-25 RX ADMIN — SODIUM CHLORIDE 125 MILLILITER(S): 9 INJECTION, SOLUTION INTRAVENOUS at 22:46

## 2022-05-25 RX ADMIN — CARVEDILOL PHOSPHATE 25 MILLIGRAM(S): 80 CAPSULE, EXTENDED RELEASE ORAL at 22:49

## 2022-05-25 RX ADMIN — Medication 15 MILLIGRAM(S): at 22:49

## 2022-05-25 RX ADMIN — Medication 400 MILLIGRAM(S): at 22:51

## 2022-05-25 RX ADMIN — TAMSULOSIN HYDROCHLORIDE 0.4 MILLIGRAM(S): 0.4 CAPSULE ORAL at 22:48

## 2022-05-25 RX ADMIN — HEPARIN SODIUM 5000 UNIT(S): 5000 INJECTION INTRAVENOUS; SUBCUTANEOUS at 11:44

## 2022-05-25 RX ADMIN — Medication 1000 MILLIGRAM(S): at 23:19

## 2022-05-25 RX ADMIN — Medication 2 MILLIGRAM(S): at 23:23

## 2022-05-25 RX ADMIN — ALVIMOPAN 12 MILLIGRAM(S): 12 CAPSULE ORAL at 11:45

## 2022-05-25 RX ADMIN — ATORVASTATIN CALCIUM 40 MILLIGRAM(S): 80 TABLET, FILM COATED ORAL at 22:49

## 2022-05-25 NOTE — PATIENT PROFILE ADULT - NSCANCSCRNDXHH_GEN_A_NUR
[facility] Hospitalist Progress Note        Subjective:  Patient was seen and examined, slept better overnight, continues to have visual and auditory hallucination, no shortness of breath or palpitation.  No nausea vomiting.  Tolerating diet well.      Physical Exam    VITAL SIGNS:    Vital Last Value 24 Hour Range   Temperature 97.7 °F (36.5 °C) (02/06/22 2247) Temp  Min: 97.7 °F (36.5 °C)  Max: 97.9 °F (36.6 °C)   Pulse 75 (02/06/22 2247) Pulse  Min: 74  Max: 78   Respiratory 15 (02/06/22 2247) Resp  Min: 15  Max: 18   Non-Invasive  Blood Pressure 106/66 (02/06/22 2247) BP  Min: 94/62  Max: 111/66   Pulse Oximetry 94 % (02/06/22 2247) SpO2  Min: 94 %  Max: 96 %     Vital Today Admitted   Weight 78.5 kg (173 lb 1 oz) (02/07/22 0600) Weight: 66.5 kg (146 lb 9.7 oz) (02/01/22 1710)   Height N/A Height: 6' (182.9 cm) (02/01/22 1710)   Body Mass Index N/A BMI (Calculated): 19.88 (02/01/22 1710)     INTAKE/OUTPUT:    No intake or output data in the 24 hours ending 02/07/22 0830     General: No acute distress, age appropriate  Eyes: Extraocular muscles are intact, pupil round and equal reactive to light and accommodation, anicteric  HENT: Normocephalic, oral mucosa is moist.  Neck: Supple, non-tender, no jugular vein distention  Respiratory: Lungs clear to auscultation bilaterally, respirations non-labored, breath sounds equal, symmetrical chest wall expansion  Cardiovascular: Normal rate, regular rhythm, no murmur, good pulses equal in all extremities, normal peripheral perfusion, no lower extremity pitting edema.  Gastrointestinal : Soft, non-tender, no obvious distension, no organomegly, active bowel sounds.  Genitourinary: No costovertebral angle tenderness.  Lymphatics: No lymphadenopathy in neck  Musculoskeletal: Grossly normal and symmetric movements in all extremities, no tenderness, no swelling, no deformity  Integumentary: Warm, dry, well perfused, intact, no jaundice  Neurologic: Alert and oriented x3,  normal  sensory, normal motor function, no obvious focal deficits.  Cognition and Speech: Oriented, speech clear and coherent, functional cognition intact   Psychiatric: Flat affect, visual auditory hallucinations    Current Medications:  Current Facility-Administered Medications   Medication Dose Route Frequency Provider Last Rate Last Admin   • OLANZapine (ZyPREXA ZYDIS) disintegrating tablet 10 mg  10 mg Oral Nightly Corene Severino MD   10 mg at 02/06/22 2235   • escitalopram (LEXAPRO) tablet 10 mg  10 mg Oral Daily Coreen Severino MD   10 mg at 02/06/22 0907   • sodium chloride 0.9 % flush bag 25 mL  25 mL Intravenous PRN Luis De Oliveira MD       • sodium chloride (PF) 0.9 % injection 2 mL  2 mL Intracatheter 2 times per day Luis De Oliveira MD   2 mL at 02/06/22 2235   • Potassium Standard Replacement Protocol   Does not apply See Admin Instructions Luis De Oliveira MD       • Magnesium Standard Replacement Protocol   Does not apply See Admin Instructions Luis De Oliveira MD       • ondansetron (ZOFRAN) injection 4 mg  4 mg Intravenous BID PRN Luis De Oliveira MD       • acetaminophen (TYLENOL) tablet 650 mg  650 mg Oral Q4H PRN Luis De Oliveira MD       • polyethylene glycol (MIRALAX) packet 17 g  17 g Oral Daily PRN Luis De Oliveira MD       • docusate sodium-sennosides (SENOKOT S) 50-8.6 MG 2 tablet  2 tablet Oral Daily PRN Luis De Oliveira MD       • sodium chloride 0.9 % flush bag 25 mL  25 mL Intravenous PRN Luis De Oliveira MD       • enoxaparin (LOVENOX) injection 40 mg  40 mg Subcutaneous Daily Luis De Oliveira MD   40 mg at 02/06/22 0907      Prior to Admission medications    Medication Sig Start Date End Date Taking? Authorizing Provider   OLANZapine (ZyPREXA) 15 MG tablet Take 30 mg by mouth daily.    Outside Provider         Labs        Recent Labs   Lab 02/06/22  0654 02/04/22  0534 02/02/22  0506   WBC 4.7 5.4 5.2   HGB 14.8 14.7 14.3   HCT 45.6 46.7 43.4   MCV 87.7 87.9 86.6    MCH 28.5 27.7 28.5   MCHC 32.5 31.5* 32.9    228 232       Recent Labs   Lab 02/06/22  0654 02/04/22  0534 02/03/22  0617 02/02/22  0506 02/01/22  1701   SODIUM  --   --   --   --  137   POTASSIUM  --   --  3.9 3.9 3.6   CHLORIDE  --   --   --   --  104   CO2  --   --   --   --  28   BUN  --   --   --   --  13   CREATININE  --   --   --   --  1.16   GLUCOSE  --   --   --   --  71   CALCIUM  --   --   --   --  9.4    200  --  165  --        No results found     Microbiology Results     None           COVID Labs  Recent Labs   Lab 02/06/22  0654 02/04/22  0534 02/02/22  0506   FERR 150 132 123   DDIMER <0.19 <0.19 <0.19   ALYMS 1.8 2.1 1.5    200 165   CRP <0.3 <0.3 <0.3       Imaging  No orders to display         Assessment and Plan    * COVID-19 virus detected  Assessment & Plan  Asymptomatic, will monitor oxygenation    Suicidal ideation  Assessment & Plan  Patient was eval by psychiatric service, one-on-one sitter    Schizophrenia (CMS/[facility])  Assessment & Plan  Patient presented with visual emergency hallucination, was previously on olanzapine but stopped taking it about a month prior to admission  Patient was eval by psychiatric service, on olanzapine and escitalopram  Continues to have visual and auditory hallucination  Patient is in PHP program while on quarantine  Patient is receiving inpatient psychiatric care, in medical unit due to COVID-19 positive test         Diet  General diet      Disposition  Patient is receiving inpatient psychiatric care but remains on medical unit for quarantine reasons.   Transfer to psych unit when off of quarantine.  Discussed with nursing staff      DVT PPX:  Current Active Medications for DVT Prophylaxis (From admission, onward)         Stop     enoxaparin (LOVENOX) injection 40 mg  40 mg,   Subcutaneous,   DAILY         --                Code Status:    Code Status Information     Code Status    Full Resuscitation               Kunal Walker  MD  2/7/2022   no

## 2022-05-26 LAB
ANION GAP SERPL CALC-SCNC: 4 MMOL/L — LOW (ref 5–17)
BASE EXCESS BLDA CALC-SCNC: 4 MMOL/L — HIGH (ref -2–3)
BASE EXCESS BLDA CALC-SCNC: 4.5 MMOL/L — HIGH (ref -2–3)
BASE EXCESS BLDA CALC-SCNC: 4.5 MMOL/L — HIGH (ref -2–3)
BLOOD GAS COMMENTS ARTERIAL: SIGNIFICANT CHANGE UP
BUN SERPL-MCNC: 23 MG/DL — SIGNIFICANT CHANGE UP (ref 7–23)
CALCIUM SERPL-MCNC: 8.6 MG/DL — SIGNIFICANT CHANGE UP (ref 8.5–10.1)
CHLORIDE SERPL-SCNC: 104 MMOL/L — SIGNIFICANT CHANGE UP (ref 96–108)
CO2 BLDA-SCNC: 34 MMOL/L — HIGH (ref 19–24)
CO2 BLDA-SCNC: 38 MMOL/L — HIGH (ref 19–24)
CO2 BLDA-SCNC: 38 MMOL/L — HIGH (ref 19–24)
CO2 SERPL-SCNC: 30 MMOL/L — SIGNIFICANT CHANGE UP (ref 22–31)
CREAT SERPL-MCNC: 1.6 MG/DL — HIGH (ref 0.5–1.3)
EGFR: 51 ML/MIN/1.73M2 — LOW
GAS PNL BLDA: SIGNIFICANT CHANGE UP
GLUCOSE SERPL-MCNC: 162 MG/DL — HIGH (ref 70–99)
HCO3 BLDA-SCNC: 32 MMOL/L — HIGH (ref 21–28)
HCO3 BLDA-SCNC: 35 MMOL/L — HIGH (ref 21–28)
HCO3 BLDA-SCNC: 35 MMOL/L — HIGH (ref 21–28)
HCT VFR BLD CALC: 45.3 % — SIGNIFICANT CHANGE UP (ref 39–50)
HGB BLD-MCNC: 13.3 G/DL — SIGNIFICANT CHANGE UP (ref 13–17)
MAGNESIUM SERPL-MCNC: 2.2 MG/DL — SIGNIFICANT CHANGE UP (ref 1.6–2.6)
MCHC RBC-ENTMCNC: 25.7 PG — LOW (ref 27–34)
MCHC RBC-ENTMCNC: 29.4 GM/DL — LOW (ref 32–36)
MCV RBC AUTO: 87.6 FL — SIGNIFICANT CHANGE UP (ref 80–100)
PCO2 BLDA: 63 MMHG — HIGH (ref 35–48)
PCO2 BLDA: 86 MMHG — CRITICAL HIGH (ref 35–48)
PCO2 BLDA: 90 MMHG — CRITICAL HIGH (ref 35–48)
PH BLDA: 7.2 — CRITICAL LOW (ref 7.35–7.45)
PH BLDA: 7.22 — LOW (ref 7.35–7.45)
PH BLDA: 7.32 — LOW (ref 7.35–7.45)
PHOSPHATE SERPL-MCNC: 4.9 MG/DL — HIGH (ref 2.5–4.5)
PLATELET # BLD AUTO: 248 K/UL — SIGNIFICANT CHANGE UP (ref 150–400)
PO2 BLDA: 120 MMHG — HIGH (ref 83–108)
PO2 BLDA: 58 MMHG — LOW (ref 83–108)
PO2 BLDA: 61 MMHG — LOW (ref 83–108)
POTASSIUM SERPL-MCNC: 4.4 MMOL/L — SIGNIFICANT CHANGE UP (ref 3.5–5.3)
POTASSIUM SERPL-SCNC: 4.4 MMOL/L — SIGNIFICANT CHANGE UP (ref 3.5–5.3)
RBC # BLD: 5.17 M/UL — SIGNIFICANT CHANGE UP (ref 4.2–5.8)
RBC # FLD: 19.9 % — HIGH (ref 10.3–14.5)
SAO2 % BLDA: 89 % — SIGNIFICANT CHANGE UP
SAO2 % BLDA: 90 % — SIGNIFICANT CHANGE UP
SAO2 % BLDA: 99 % — SIGNIFICANT CHANGE UP
SODIUM SERPL-SCNC: 138 MMOL/L — SIGNIFICANT CHANGE UP (ref 135–145)
WBC # BLD: 13.48 K/UL — HIGH (ref 3.8–10.5)
WBC # FLD AUTO: 13.48 K/UL — HIGH (ref 3.8–10.5)

## 2022-05-26 PROCEDURE — 99221 1ST HOSP IP/OBS SF/LOW 40: CPT

## 2022-05-26 RX ADMIN — GABAPENTIN 400 MILLIGRAM(S): 400 CAPSULE ORAL at 22:19

## 2022-05-26 RX ADMIN — SODIUM CHLORIDE 125 MILLILITER(S): 9 INJECTION, SOLUTION INTRAVENOUS at 15:39

## 2022-05-26 RX ADMIN — CARVEDILOL PHOSPHATE 25 MILLIGRAM(S): 80 CAPSULE, EXTENDED RELEASE ORAL at 09:29

## 2022-05-26 RX ADMIN — ALVIMOPAN 12 MILLIGRAM(S): 12 CAPSULE ORAL at 05:24

## 2022-05-26 RX ADMIN — ATORVASTATIN CALCIUM 40 MILLIGRAM(S): 80 TABLET, FILM COATED ORAL at 22:20

## 2022-05-26 RX ADMIN — GABAPENTIN 400 MILLIGRAM(S): 400 CAPSULE ORAL at 09:28

## 2022-05-26 RX ADMIN — FINASTERIDE 5 MILLIGRAM(S): 5 TABLET, FILM COATED ORAL at 09:29

## 2022-05-26 RX ADMIN — Medication 15 MILLIGRAM(S): at 05:24

## 2022-05-26 RX ADMIN — Medication 1: at 12:13

## 2022-05-26 RX ADMIN — Medication 100 GRAM(S): at 05:26

## 2022-05-26 RX ADMIN — CARVEDILOL PHOSPHATE 25 MILLIGRAM(S): 80 CAPSULE, EXTENDED RELEASE ORAL at 22:20

## 2022-05-26 RX ADMIN — Medication 650 MILLIGRAM(S): at 22:21

## 2022-05-26 RX ADMIN — ENOXAPARIN SODIUM 40 MILLIGRAM(S): 100 INJECTION SUBCUTANEOUS at 05:25

## 2022-05-26 RX ADMIN — Medication 15 MILLIGRAM(S): at 05:54

## 2022-05-26 RX ADMIN — TAMSULOSIN HYDROCHLORIDE 0.4 MILLIGRAM(S): 0.4 CAPSULE ORAL at 22:20

## 2022-05-26 RX ADMIN — Medication 81 MILLIGRAM(S): at 09:28

## 2022-05-26 NOTE — PROGRESS NOTE ADULT - SUBJECTIVE AND OBJECTIVE BOX
Pt. seen and examined at bedside this morning. Patient denies fever, chest pain, SOB, nausea, vomiting, diarrhea or constipation. No acute events overnight.     Vital Signs Last 24 Hrs  T(C): 36.7 (26 May 2022 08:35), Max: 36.7 (26 May 2022 05:46)  T(F): 98.1 (26 May 2022 08:35), Max: 98.1 (26 May 2022 05:46)  HR: 70 (26 May 2022 08:35) (56 - 80)  BP: 128/82 (26 May 2022 08:35) (119/68 - 163/98)  BP(mean): 111 (25 May 2022 22:00) (111 - 111)  RR: 20 (26 May 2022 08:35) (16 - 22)  SpO2: 91% (26 May 2022 08:35) (90% - 96%)    LABS:                        13.3   13.48 )-----------( 248      ( 26 May 2022 08:00 )             45.3     26 May 2022 08:00    138    |  104    |  23     ----------------------------<  162    4.4     |  30     |  1.60     Ca    8.6        26 May 2022 08:00  Phos  4.9       26 May 2022 08:00  Mg     2.2       26 May 2022 08:00      MEDICATIONS  (STANDING):  alvimopan 12 milliGRAM(s) Oral two times a day  aspirin enteric coated 81 milliGRAM(s) Oral daily  atorvastatin 40 milliGRAM(s) Oral at bedtime  carvedilol 25 milliGRAM(s) Oral every 12 hours  dextrose 5%. 1000 milliLiter(s) (100 mL/Hr) IV Continuous <Continuous>  dextrose 5%. 1000 milliLiter(s) (50 mL/Hr) IV Continuous <Continuous>  dextrose 50% Injectable 25 Gram(s) IV Push once  dextrose 50% Injectable 12.5 Gram(s) IV Push once  dextrose 50% Injectable 25 Gram(s) IV Push once  enoxaparin Injectable 40 milliGRAM(s) SubCutaneous every 24 hours  finasteride 5 milliGRAM(s) Oral daily  gabapentin 400 milliGRAM(s) Oral two times a day  glucagon  Injectable 1 milliGRAM(s) IntraMuscular once  insulin lispro (ADMELOG) corrective regimen sliding scale   SubCutaneous three times a day before meals  ketorolac   Injectable 15 milliGRAM(s) IV Push every 6 hours  lactated ringers. 1000 milliLiter(s) (125 mL/Hr) IV Continuous <Continuous>  tamsulosin 0.4 milliGRAM(s) Oral at bedtime    MEDICATIONS  (PRN):  acetaminophen     Tablet .. 650 milliGRAM(s) Oral every 6 hours PRN Temp greater or equal to 38C (100.4F), Mild Pain (1 - 3)  ALPRAZolam 2 milliGRAM(s) Oral two times a day PRN anxiety  dextrose Oral Gel 15 Gram(s) Oral once PRN Blood Glucose LESS THAN 70 milliGRAM(s)/deciliter  ondansetron Injectable 4 milliGRAM(s) IV Push every 6 hours PRN Nausea  oxyCODONE    IR 10 milliGRAM(s) Oral every 4 hours PRN Severe Pain (7 - 10)  oxyCODONE    IR 5 milliGRAM(s) Oral every 4 hours PRN Moderate Pain (4 - 6)

## 2022-05-26 NOTE — CONSULT NOTE ADULT - SUBJECTIVE AND OBJECTIVE BOX
PCP: Dr. NATE Sommer    CHIEF COMPLAINT: ileostomy reversal    HISTORY OF THE PRESENT ILLNESS: this is a 57 yo male with pmh: Morbid obesity, RAFFI, current everyday smoker, DM, type 2, htn, neuropathy, with h/o diverticulitis in July 2021, hartmanns procedure with colostomy, in November pt was admitted for planned colostomy reversal.  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 pt presented yesterday for planned Ileostomy reversal,  post     PAST MEDICAL HISTORY:    PAST SURGICAL HISTORY:    FAMILY HISTORY:   non-contributory to the patient's current presentation    SOCIAL HISTORY:  no smoking, no alcohol, no drugs    ALLERGIES:    HOME MEDS:    REVIEW OF SYSTEMS:   All 10 systems reviewed in detailed and found to be negative with the exception of what has already been described above    MEDICATIONS  (STANDING):  alvimopan 12 milliGRAM(s) Oral two times a day  aspirin enteric coated 81 milliGRAM(s) Oral daily  atorvastatin 40 milliGRAM(s) Oral at bedtime  carvedilol 25 milliGRAM(s) Oral every 12 hours  dextrose 5%. 1000 milliLiter(s) (100 mL/Hr) IV Continuous <Continuous>  dextrose 5%. 1000 milliLiter(s) (50 mL/Hr) IV Continuous <Continuous>  dextrose 50% Injectable 25 Gram(s) IV Push once  dextrose 50% Injectable 12.5 Gram(s) IV Push once  dextrose 50% Injectable 25 Gram(s) IV Push once  enoxaparin Injectable 40 milliGRAM(s) SubCutaneous every 24 hours  finasteride 5 milliGRAM(s) Oral daily  gabapentin 400 milliGRAM(s) Oral two times a day  glucagon  Injectable 1 milliGRAM(s) IntraMuscular once  insulin lispro (ADMELOG) corrective regimen sliding scale   SubCutaneous three times a day before meals  ketorolac   Injectable 15 milliGRAM(s) IV Push every 6 hours  lactated ringers. 1000 milliLiter(s) (125 mL/Hr) IV Continuous <Continuous>  tamsulosin 0.4 milliGRAM(s) Oral at bedtime    MEDICATIONS  (PRN):  acetaminophen     Tablet .. 650 milliGRAM(s) Oral every 6 hours PRN Temp greater or equal to 38C (100.4F), Mild Pain (1 - 3)  ALPRAZolam 2 milliGRAM(s) Oral two times a day PRN anxiety  dextrose Oral Gel 15 Gram(s) Oral once PRN Blood Glucose LESS THAN 70 milliGRAM(s)/deciliter  ondansetron Injectable 4 milliGRAM(s) IV Push every 6 hours PRN Nausea  oxyCODONE    IR 10 milliGRAM(s) Oral every 4 hours PRN Severe Pain (7 - 10)  oxyCODONE    IR 5 milliGRAM(s) Oral every 4 hours PRN Moderate Pain (4 - 6)      VITALS:  T(F): 98.1 (05-26-22 @ 08:35), Max: 98.1 (05-26-22 @ 05:46)  HR: 70 (05-26-22 @ 08:35) (56 - 80)  BP: 128/82 (05-26-22 @ 08:35) (119/68 - 163/98)  RR: 20 (05-26-22 @ 08:35) (16 - 22)  SpO2: 91% (05-26-22 @ 08:35) (90% - 96%)  Wt(kg): --    I&O's Summary    25 May 2022 07:01  -  26 May 2022 07:00  --------------------------------------------------------  IN: 700 mL / OUT: 200 mL / NET: 500 mL        CAPILLARY BLOOD GLUCOSE      POCT Blood Glucose.: 137 mg/dL (26 May 2022 08:12)  POCT Blood Glucose.: 131 mg/dL (25 May 2022 22:44)  POCT Blood Glucose.: 110 mg/dL (25 May 2022 11:31)      PHYSICAL EXAM:    HEENT:  pupils equal and reactive, EOMI, no oropharyngeal lesions, erythema, exudates, oral thrush  NECK:   supple, no carotid bruits, no palpable lymph nodes, no thyromegaly  CV:  +S1, +S2, regular, no murmurs or rubs  RESP:   lungs clear to auscultation bilaterally, no wheezing, rales, rhonchi, good air entry bilaterally  BREAST:  not examined  GI:  abdomen soft, non-tender, non-distended, normal BS, no bruits, no abdominal masses, no palpable masses  RECTAL:  not examined  :  not examined  MSK:   normal muscle tone, no atrophy, no rigidity, no contractions  EXT:  no clubbing, no cyanosis, no edema, no calf pain, swelling or erythema  VASCULAR:  pulses equal and symmetric in the upper and lower extremities  NEURO:  AAOX3, no focal neurological deficits, follows all commands, able to move extremities spontaneously  SKIN:  no ulcers, lesions or rashes    LABS:                            13.3   13.48 )-----------( 248      ( 26 May 2022 08:00 )             45.3     05-26    138  |  104  |  23  ----------------------------<  162<H>  4.4   |  30  |  1.60<H>    Ca    8.6      26 May 2022 08:00  Phos  4.9     05-26  Mg     2.2     05-26                                              EKG:     RADIOLOGY STUDIES:      IMPRESSION: with above pmh. a/w:    pain control  IVF's  frank  Monitor I& O  Monitor Cbc, BMP  BGM per CRS protocol  Cont Abxs  NPO  Enterag    #VTE prophylaxis  hep sq  Venodynes  Amb               PCP: Dr. NATE Sommer    CHIEF COMPLAINT: ileostomy reversal    HISTORY OF THE PRESENT ILLNESS: this is a 55 yo male with pmh: Morbid obesity, RAFFI, current everyday smoker, DM, type 2, htn, neuropathy, with h/o diverticulitis in July 2021, hartmanns procedure with colostomy, in November pt was admitted for planned colostomy reversal.  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 Pt was supposed to have reversal in february, however, his A1C was > 9, so surgery was postponed, pt presented yesterday for planned Ileostomy reversal,  post op he desatted to the    PAST MEDICAL HISTORY:    PAST SURGICAL HISTORY:    FAMILY HISTORY:   non-contributory to the patient's current presentation    SOCIAL HISTORY:  no smoking, no alcohol, no drugs    ALLERGIES:    HOME MEDS:    REVIEW OF SYSTEMS:   All 10 systems reviewed in detailed and found to be negative with the exception of what has already been described above    MEDICATIONS  (STANDING):  alvimopan 12 milliGRAM(s) Oral two times a day  aspirin enteric coated 81 milliGRAM(s) Oral daily  atorvastatin 40 milliGRAM(s) Oral at bedtime  carvedilol 25 milliGRAM(s) Oral every 12 hours  dextrose 5%. 1000 milliLiter(s) (100 mL/Hr) IV Continuous <Continuous>  dextrose 5%. 1000 milliLiter(s) (50 mL/Hr) IV Continuous <Continuous>  dextrose 50% Injectable 25 Gram(s) IV Push once  dextrose 50% Injectable 12.5 Gram(s) IV Push once  dextrose 50% Injectable 25 Gram(s) IV Push once  enoxaparin Injectable 40 milliGRAM(s) SubCutaneous every 24 hours  finasteride 5 milliGRAM(s) Oral daily  gabapentin 400 milliGRAM(s) Oral two times a day  glucagon  Injectable 1 milliGRAM(s) IntraMuscular once  insulin lispro (ADMELOG) corrective regimen sliding scale   SubCutaneous three times a day before meals  ketorolac   Injectable 15 milliGRAM(s) IV Push every 6 hours  lactated ringers. 1000 milliLiter(s) (125 mL/Hr) IV Continuous <Continuous>  tamsulosin 0.4 milliGRAM(s) Oral at bedtime    MEDICATIONS  (PRN):  acetaminophen     Tablet .. 650 milliGRAM(s) Oral every 6 hours PRN Temp greater or equal to 38C (100.4F), Mild Pain (1 - 3)  ALPRAZolam 2 milliGRAM(s) Oral two times a day PRN anxiety  dextrose Oral Gel 15 Gram(s) Oral once PRN Blood Glucose LESS THAN 70 milliGRAM(s)/deciliter  ondansetron Injectable 4 milliGRAM(s) IV Push every 6 hours PRN Nausea  oxyCODONE    IR 10 milliGRAM(s) Oral every 4 hours PRN Severe Pain (7 - 10)  oxyCODONE    IR 5 milliGRAM(s) Oral every 4 hours PRN Moderate Pain (4 - 6)      VITALS:  T(F): 98.1 (05-26-22 @ 08:35), Max: 98.1 (05-26-22 @ 05:46)  HR: 70 (05-26-22 @ 08:35) (56 - 80)  BP: 128/82 (05-26-22 @ 08:35) (119/68 - 163/98)  RR: 20 (05-26-22 @ 08:35) (16 - 22)  SpO2: 91% (05-26-22 @ 08:35) (90% - 96%)  Wt(kg): --    I&O's Summary    25 May 2022 07:01  -  26 May 2022 07:00  --------------------------------------------------------  IN: 700 mL / OUT: 200 mL / NET: 500 mL        CAPILLARY BLOOD GLUCOSE      POCT Blood Glucose.: 137 mg/dL (26 May 2022 08:12)  POCT Blood Glucose.: 131 mg/dL (25 May 2022 22:44)  POCT Blood Glucose.: 110 mg/dL (25 May 2022 11:31)      PHYSICAL EXAM:    HEENT:  pupils equal and reactive, EOMI, no oropharyngeal lesions, erythema, exudates, oral thrush  NECK:   supple, no carotid bruits, no palpable lymph nodes, no thyromegaly  CV:  +S1, +S2, regular, no murmurs or rubs  RESP:   lungs clear to auscultation bilaterally, no wheezing, rales, rhonchi, good air entry bilaterally  BREAST:  not examined  GI:  abdomen soft, non-tender, non-distended, normal BS, no bruits, no abdominal masses, no palpable masses  RECTAL:  not examined  :  not examined  MSK:   normal muscle tone, no atrophy, no rigidity, no contractions  EXT:  no clubbing, no cyanosis, no edema, no calf pain, swelling or erythema  VASCULAR:  pulses equal and symmetric in the upper and lower extremities  NEURO:  AAOX3, no focal neurological deficits, follows all commands, able to move extremities spontaneously  SKIN:  no ulcers, lesions or rashes    LABS:                            13.3   13.48 )-----------( 248      ( 26 May 2022 08:00 )             45.3     05-26    138  |  104  |  23  ----------------------------<  162<H>  4.4   |  30  |  1.60<H>    Ca    8.6      26 May 2022 08:00  Phos  4.9     05-26  Mg     2.2     05-26                                              EKG:     RADIOLOGY STUDIES:      IMPRESSION: with above pmh. a/w:    pain control  IVF's  frank  Monitor I& O  Monitor Cbc, BMP  BGM per CRS protocol  Cont Abxs  NPO  Enterag    #VTE prophylaxis  hep sq  Venodynes  Amb               PCP: Dr. NATE Sommer    CHIEF COMPLAINT: ileostomy reversal    HISTORY OF THE PRESENT ILLNESS: this is a 55 yo male with pmh: Morbid obesity, RAFFI, current everyday smoker, DM, type 2, htn, neuropathy, with h/o diverticulitis in July 2021, hartmanns procedure with colostomy, in November pt was admitted for planned colostomy reversal.  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 Pt was supposed to have reversal in february, however, his A1C was > 9, so surgery was postponed, pt presented yesterday for planned Ileostomy reversal,  post op he desatted to the 80's and was transferred to [facility] for closer monitoring. seen By Dr Patterson, Pulmonary    PAST MEDICAL HISTORY: as above    PAST SURGICAL HISTORY: evelio's procedure, colostomy    FAMILY HISTORY:   father; DM, HTN, Mother: DM    SOCIAL HISTORY: current everyday smoker 1/4 ppd x 30 years, rare, etoh, H/o cocaine/crack abuse, in recovery x 20 years    ALLERGIES:  NKDA    HOME MEDS: see med rec    REVIEW OF SYSTEMS:   All 10 systems reviewed in detailed and found to be negative with the exception of what has already been described above    MEDICATIONS  (STANDING):  alvimopan 12 milliGRAM(s) Oral two times a day  aspirin enteric coated 81 milliGRAM(s) Oral daily  atorvastatin 40 milliGRAM(s) Oral at bedtime  carvedilol 25 milliGRAM(s) Oral every 12 hours  dextrose 5%. 1000 milliLiter(s) (100 mL/Hr) IV Continuous <Continuous>  dextrose 5%. 1000 milliLiter(s) (50 mL/Hr) IV Continuous <Continuous>  dextrose 50% Injectable 25 Gram(s) IV Push once  dextrose 50% Injectable 12.5 Gram(s) IV Push once  dextrose 50% Injectable 25 Gram(s) IV Push once  enoxaparin Injectable 40 milliGRAM(s) SubCutaneous every 24 hours  finasteride 5 milliGRAM(s) Oral daily  gabapentin 400 milliGRAM(s) Oral two times a day  glucagon  Injectable 1 milliGRAM(s) IntraMuscular once  insulin lispro (ADMELOG) corrective regimen sliding scale   SubCutaneous three times a day before meals  ketorolac   Injectable 15 milliGRAM(s) IV Push every 6 hours  lactated ringers. 1000 milliLiter(s) (125 mL/Hr) IV Continuous <Continuous>  tamsulosin 0.4 milliGRAM(s) Oral at bedtime    MEDICATIONS  (PRN):  acetaminophen     Tablet .. 650 milliGRAM(s) Oral every 6 hours PRN Temp greater or equal to 38C (100.4F), Mild Pain (1 - 3)  ALPRAZolam 2 milliGRAM(s) Oral two times a day PRN anxiety  dextrose Oral Gel 15 Gram(s) Oral once PRN Blood Glucose LESS THAN 70 milliGRAM(s)/deciliter  ondansetron Injectable 4 milliGRAM(s) IV Push every 6 hours PRN Nausea  oxyCODONE    IR 10 milliGRAM(s) Oral every 4 hours PRN Severe Pain (7 - 10)  oxyCODONE    IR 5 milliGRAM(s) Oral every 4 hours PRN Moderate Pain (4 - 6)      VITALS:  T(F): 98.1 (05-26-22 @ 08:35), Max: 98.1 (05-26-22 @ 05:46)  HR: 70 (05-26-22 @ 08:35) (56 - 80)  BP: 128/82 (05-26-22 @ 08:35) (119/68 - 163/98)  RR: 20 (05-26-22 @ 08:35) (16 - 22)  SpO2: 91% (05-26-22 @ 08:35) (90% - 96%)  Wt(kg): --    I&O's Summary    25 May 2022 07:01  -  26 May 2022 07:00  --------------------------------------------------------  IN: 700 mL / OUT: 200 mL / NET: 500 mL        CAPILLARY BLOOD GLUCOSE      POCT Blood Glucose.: 137 mg/dL (26 May 2022 08:12)  POCT Blood Glucose.: 131 mg/dL (25 May 2022 22:44)  POCT Blood Glucose.: 110 mg/dL (25 May 2022 11:31)    PHYSICAL EXAM:    GENERAL: Comfortable, no acute distress   HEAD:  Normocephalic, atraumatic  EYES: EOMI, PERRLA  HEENT: Moist mucous membranes  NECK: Supple, No JVD  NERVOUS SYSTEM:  Alert & Oriented X3, Motor Strength 5/5 B/L upper and lower extremities  CHEST/LUNG: Clear to auscultation bilaterally  HEART: Regular rate and rhythm  ABDOMEN: Soft, non tender, Nondistended, Bowel sounds present  GENITOURINARY: Voiding, no palpable bladder  EXTREMITIES:   No clubbing, cyanosis, or edema  MUSCULOSKELETAL- No muscle tenderness, no joint tenderness  SKIN-no rash        LABS:                        13.3   13.48 )-----------( 248      ( 26 May 2022 08:00 )             45.3     05-26    138  |  104  |  23  ----------------------------<  162<H>  4.4   |  30  |  1.60<H>    Ca    8.6      26 May 2022 08:00  Phos  4.9     05-26  Mg     2.2     05-26          IMPRESSION: 55 yo male with above pmh. a/w:    # h/o chronic diverticulitis, ileostomy 11/2021  # S/p reversal    pain control  IVF's  frank  Monitor I& O  Monitor Cbc, BMP  BGM per CRS protocol  Cont Abxs  NPO  Enterag    #VTE prophylaxis  hep sq  Venodynes  Amb               PCP: Dr. NATE Sommer    CHIEF COMPLAINT: ileostomy reversal    HISTORY OF THE PRESENT ILLNESS: this is a 55 yo male with pmh: Morbid obesity, RAFFI, current everyday smoker, DM, type 2, htn, neuropathy, with h/o diverticulitis in July 2021, hartmanns procedure with colostomy, in November pt was admitted for planned colostomy reversal.  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 Pt was supposed to have reversal in february, however, his A1C was > 9, so surgery was postponed, pt presented yesterday for planned Ileostomy reversal,  post op he desatted to the 80's and was transferred to [facility] for closer monitoring. seen at bedside, on 02 4LNC, unable to stay awake when speaking, unable to answer questions, seems somewhat confused, concern for co2 retention, o2 sat 87%, stat abg drawn, pt in resp acidosis with  PH 7.20, PCO@ 90,  Bipap to be started stat.   awaiting Pulmonary consult.  We are consulted for medical management    PAST MEDICAL HISTORY: as above    PAST SURGICAL HISTORY: evelio's procedure, colostomy    FAMILY HISTORY:   father; DM, HTN, Mother: DM    SOCIAL HISTORY: current everyday smoker 1/4 ppd x 30 years, rare, etoh, H/o cocaine/crack abuse, in recovery x 20 years    ALLERGIES:  NKDA    HOME MEDS: see med rec    REVIEW OF SYSTEMS:   All 10 systems reviewed in detailed and found to be negative with the exception of what has already been described above    MEDICATIONS  (STANDING):  alvimopan 12 milliGRAM(s) Oral two times a day  aspirin enteric coated 81 milliGRAM(s) Oral daily  atorvastatin 40 milliGRAM(s) Oral at bedtime  carvedilol 25 milliGRAM(s) Oral every 12 hours  dextrose 5%. 1000 milliLiter(s) (100 mL/Hr) IV Continuous <Continuous>  dextrose 5%. 1000 milliLiter(s) (50 mL/Hr) IV Continuous <Continuous>  dextrose 50% Injectable 25 Gram(s) IV Push once  dextrose 50% Injectable 12.5 Gram(s) IV Push once  dextrose 50% Injectable 25 Gram(s) IV Push once  enoxaparin Injectable 40 milliGRAM(s) SubCutaneous every 24 hours  finasteride 5 milliGRAM(s) Oral daily  gabapentin 400 milliGRAM(s) Oral two times a day  glucagon  Injectable 1 milliGRAM(s) IntraMuscular once  insulin lispro (ADMELOG) corrective regimen sliding scale   SubCutaneous three times a day before meals  ketorolac   Injectable 15 milliGRAM(s) IV Push every 6 hours  lactated ringers. 1000 milliLiter(s) (125 mL/Hr) IV Continuous <Continuous>  tamsulosin 0.4 milliGRAM(s) Oral at bedtime    MEDICATIONS  (PRN):  acetaminophen     Tablet .. 650 milliGRAM(s) Oral every 6 hours PRN Temp greater or equal to 38C (100.4F), Mild Pain (1 - 3)  ALPRAZolam 2 milliGRAM(s) Oral two times a day PRN anxiety  dextrose Oral Gel 15 Gram(s) Oral once PRN Blood Glucose LESS THAN 70 milliGRAM(s)/deciliter  ondansetron Injectable 4 milliGRAM(s) IV Push every 6 hours PRN Nausea  oxyCODONE    IR 10 milliGRAM(s) Oral every 4 hours PRN Severe Pain (7 - 10)  oxyCODONE    IR 5 milliGRAM(s) Oral every 4 hours PRN Moderate Pain (4 - 6)      VITALS:  T(F): 98.1 (05-26-22 @ 08:35), Max: 98.1 (05-26-22 @ 05:46)  HR: 70 (05-26-22 @ 08:35) (56 - 80)  BP: 128/82 (05-26-22 @ 08:35) (119/68 - 163/98)  RR: 20 (05-26-22 @ 08:35) (16 - 22)  SpO2: 91% (05-26-22 @ 08:35) (90% - 96%)  Wt(kg): --    I&O's Summary    25 May 2022 07:01  -  26 May 2022 07:00  --------------------------------------------------------  IN: 700 mL / OUT: 200 mL / NET: 500 mL      Blood Gas Profile - Arterial (05.26.22 @ 13:36)   pH, Arterial: 7.20:    pCO2, Arterial: 90:  pO2, Arterial: 58 mmHg   HCO3, Arterial: 35 mmol/L   Base Excess, Arterial: 4.0 mmol/L   Oxygen Saturation, Arterial: 90 %   Total CO2, Arterial: 38 mmol/L       CAPILLARY BLOOD GLUCOSE    POCT Blood Glucose.: 155 mg/dL (05.26.22 @ 12:08)   POCT Blood Glucose.: 137 mg/dL (26 May 2022 08:12)  POCT Blood Glucose.: 131 mg/dL (25 May 2022 22:44)  POCT Blood Glucose.: 110 mg/dL (25 May 2022 11:31)    PHYSICAL EXAM:    GENERAL: Comfortable, no acute distress   HEAD:  Normocephalic, atraumatic  EYES: EOMI, PERRLA  HEENT: Moist mucous membranes  NECK: Supple, No JVD  NERVOUS SYSTEM:  lethargic, not following commands, falling asleep when speaking  CHEST/LUNG: diminished, O 2 sats 88% on 6LNC  HEART: Regular rate and rhythm  ABDOMEN: Soft, obese, abd dsg intact,  Bowel sounds hypoactive  GENITOURINARY: Voiding, no palpable bladder  EXTREMITIES:   No clubbing, cyanosis, or edema  MUSCULOSKELETAL- No muscle tenderness, no joint tenderness  SKIN-no rash        LABS:                        13.3   13.48 )-----------( 248      ( 26 May 2022 08:00 )             45.3     05-26    138  |  104  |  23  ----------------------------<  162<H>  4.4   |  30  |  1.60<H>    Ca    8.6      26 May 2022 08:00  Phos  4.9     05-26  Mg     2.2     05-26          IMPRESSION: 55 yo male with above pmh. a/w:    # h/o chronic diverticulitis, ileostomy 11/2021  # S/p reversal of ileostomy  pain control: oxycodone  IVF's  Monitor I& O  Monitor Cbc, BMP  BGM per CRS protocol  Cont Abxs  diet per crs  Enterag      #hypoxia  RAFFI, h/o non compliance with cpap  O2 sats 88% on 6L  Stat ABG: Resp acidosis stat abg: PH 7.20, PCO2 90  stat bipap  pulm consult    #leukocytosis  likely reactive  afebrile  monitor wbc    #HTN  cont bb    #HLD  cont statin    #BPH  cont finasteride/flomax    #DM, type 2  BGM/ss  hold OHAS    #neuropathy  cont gabapentin    #VTE prophylaxis  hep sq  Venodynes  Amb    paty you for the consult, will follow with you           PCP: Dr. NATE Sommer    CHIEF COMPLAINT: ileostomy reversal    HISTORY OF THE PRESENT ILLNESS: this is a 57 yo male with pmh: Morbid obesity, RAFFI, current everyday smoker, DM, type 2, htn, neuropathy, with h/o diverticulitis in July 2021, hartmanns procedure with colostomy, in November pt was admitted for planned colostomy reversal.  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 Pt was supposed to have reversal in february, however, his A1C was > 9, so surgery was postponed, pt presented yesterday for planned Ileostomy reversal,  post op he desatted to the 80's and was transferred to [facility] for closer monitoring. seen at bedside, on 02 6LNC, unable to stay awake when speaking, unable to answer questions, seems somewhat confused, concern for co2 retention, o2 sat 87%, stat abg drawn, pt in resp acidosis with  PH 7.20, PCO@ 90,  Bipap ordered stat.   awaiting Pulmonary consult.  We are consulted for medical management    PAST MEDICAL HISTORY: as above    PAST SURGICAL HISTORY: evelio's procedure, colostomy    FAMILY HISTORY:   father; DM, HTN, Mother: DM    SOCIAL HISTORY: current everyday smoker 1/4 ppd x 30 years, rare, etoh, H/o cocaine/crack abuse, in recovery x 20 years    ALLERGIES:  NKDA    HOME MEDS: see med rec    REVIEW OF SYSTEMS:   All 10 systems reviewed in detailed and found to be negative with the exception of what has already been described above    MEDICATIONS  (STANDING):  alvimopan 12 milliGRAM(s) Oral two times a day  aspirin enteric coated 81 milliGRAM(s) Oral daily  atorvastatin 40 milliGRAM(s) Oral at bedtime  carvedilol 25 milliGRAM(s) Oral every 12 hours  dextrose 5%. 1000 milliLiter(s) (100 mL/Hr) IV Continuous <Continuous>  dextrose 5%. 1000 milliLiter(s) (50 mL/Hr) IV Continuous <Continuous>  dextrose 50% Injectable 25 Gram(s) IV Push once  dextrose 50% Injectable 12.5 Gram(s) IV Push once  dextrose 50% Injectable 25 Gram(s) IV Push once  enoxaparin Injectable 40 milliGRAM(s) SubCutaneous every 24 hours  finasteride 5 milliGRAM(s) Oral daily  gabapentin 400 milliGRAM(s) Oral two times a day  glucagon  Injectable 1 milliGRAM(s) IntraMuscular once  insulin lispro (ADMELOG) corrective regimen sliding scale   SubCutaneous three times a day before meals  ketorolac   Injectable 15 milliGRAM(s) IV Push every 6 hours  lactated ringers. 1000 milliLiter(s) (125 mL/Hr) IV Continuous <Continuous>  tamsulosin 0.4 milliGRAM(s) Oral at bedtime    MEDICATIONS  (PRN):  acetaminophen     Tablet .. 650 milliGRAM(s) Oral every 6 hours PRN Temp greater or equal to 38C (100.4F), Mild Pain (1 - 3)  ALPRAZolam 2 milliGRAM(s) Oral two times a day PRN anxiety  dextrose Oral Gel 15 Gram(s) Oral once PRN Blood Glucose LESS THAN 70 milliGRAM(s)/deciliter  ondansetron Injectable 4 milliGRAM(s) IV Push every 6 hours PRN Nausea  oxyCODONE    IR 10 milliGRAM(s) Oral every 4 hours PRN Severe Pain (7 - 10)  oxyCODONE    IR 5 milliGRAM(s) Oral every 4 hours PRN Moderate Pain (4 - 6)      VITALS:  T(F): 98.1 (05-26-22 @ 08:35), Max: 98.1 (05-26-22 @ 05:46)  HR: 70 (05-26-22 @ 08:35) (56 - 80)  BP: 128/82 (05-26-22 @ 08:35) (119/68 - 163/98)  RR: 20 (05-26-22 @ 08:35) (16 - 22)  SpO2: 91% (05-26-22 @ 08:35) (90% - 96%)  Wt(kg): --    I&O's Summary    25 May 2022 07:01  -  26 May 2022 07:00  --------------------------------------------------------  IN: 700 mL / OUT: 200 mL / NET: 500 mL      Blood Gas Profile - Arterial (05.26.22 @ 13:36)   pH, Arterial: 7.20:    pCO2, Arterial: 90:  pO2, Arterial: 58 mmHg   HCO3, Arterial: 35 mmol/L   Base Excess, Arterial: 4.0 mmol/L   Oxygen Saturation, Arterial: 90 %   Total CO2, Arterial: 38 mmol/L       CAPILLARY BLOOD GLUCOSE    POCT Blood Glucose.: 155 mg/dL (05.26.22 @ 12:08)   POCT Blood Glucose.: 137 mg/dL (26 May 2022 08:12)  POCT Blood Glucose.: 131 mg/dL (25 May 2022 22:44)  POCT Blood Glucose.: 110 mg/dL (25 May 2022 11:31)    PHYSICAL EXAM:    GENERAL: Comfortable, no acute distress   HEAD:  Normocephalic, atraumatic  EYES: EOMI, PERRLA  HEENT: Moist mucous membranes  NECK: Supple, No JVD  NERVOUS SYSTEM:  lethargic, not following commands, falling asleep when speaking  CHEST/LUNG: diminished, O 2 sats 88% on 6LNC  HEART: Regular rate and rhythm  ABDOMEN: Soft, obese, abd dsg intact,  Bowel sounds hypoactive  GENITOURINARY: Voiding, no palpable bladder  EXTREMITIES:   No clubbing, cyanosis, or edema  MUSCULOSKELETAL- No muscle tenderness, no joint tenderness  SKIN-no rash        LABS:                        13.3   13.48 )-----------( 248      ( 26 May 2022 08:00 )             45.3     05-26    138  |  104  |  23  ----------------------------<  162<H>  4.4   |  30  |  1.60<H>    Ca    8.6      26 May 2022 08:00  Phos  4.9     05-26  Mg     2.2     05-26          IMPRESSION: 57 yo male with above pmh. a/w:    # h/o chronic diverticulitis, ileostomy 11/2021  # S/p reversal of ileostomy  pain control: oxycodone  IVF's  Monitor I& O  Monitor Cbc, BMP  BGM per CRS protocol  Cont Abxs  diet per crs  Enterag      #hypoxia  RAFFI, h/o non compliance with cpap  O2 sats 88% on 6L  Stat ABG: Resp acidosis stat abg: PH 7.20, PCO2 90  stat bipap  pulm consult    #leukocytosis  likely reactive  afebrile  monitor wbc    #HTN  cont bb    #HLD  cont statin    #BPH  cont finasteride/flomax    #DM, type 2  BGM/ss  hold OHAS    #neuropathy  cont gabapentin    #VTE prophylaxis  hep sq  Venodynes  Amb    paty you for the consult, will follow with you           PCP: Dr. NATE Sommer    CHIEF COMPLAINT: ileostomy reversal    HPI: 55 yo male with pmh: Morbid obesity, RAFFI, current everyday smoker, DM, type 2, htn, neuropathy, with h/o diverticulitis in July 2021, hartmanns procedure with colostomy, in November pt was admitted for planned colostomy reversal.  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 Pt was supposed to have reversal in february, however, his A1C was > 9, so surgery was postponed, pt presented yesterday for planned Ileostomy reversal,  post op he desatted to the 80's and was transferred to [facility] for closer monitoring. seen at bedside, on 02 6LNC, unable to stay awake when speaking, unable to answer questions, seems somewhat confused, concern for co2 retention, o2 sat 87%, stat abg drawn, pt in resp acidosis with  PH 7.20, PCO@ 90,  Bipap ordered stat.   awaiting Pulmonary consult.  We are consulted for medical management    PAST MEDICAL HISTORY: as above    PAST SURGICAL HISTORY: evelio's procedure, colostomy    FAMILY HISTORY:   father; DM, HTN, Mother: DM    SOCIAL HISTORY: current everyday smoker 1/4 ppd x 30 years, rare, etoh, H/o cocaine/crack abuse, in recovery x 20 years    ALLERGIES:  NKDA    HOME MEDS: see med rec    REVIEW OF SYSTEMS:   All 10 systems reviewed in detailed and found to be negative with the exception of what has already been described above    MEDICATIONS  (STANDING):  alvimopan 12 milliGRAM(s) Oral two times a day  aspirin enteric coated 81 milliGRAM(s) Oral daily  atorvastatin 40 milliGRAM(s) Oral at bedtime  carvedilol 25 milliGRAM(s) Oral every 12 hours  dextrose 5%. 1000 milliLiter(s) (100 mL/Hr) IV Continuous <Continuous>  dextrose 5%. 1000 milliLiter(s) (50 mL/Hr) IV Continuous <Continuous>  dextrose 50% Injectable 25 Gram(s) IV Push once  dextrose 50% Injectable 12.5 Gram(s) IV Push once  dextrose 50% Injectable 25 Gram(s) IV Push once  enoxaparin Injectable 40 milliGRAM(s) SubCutaneous every 24 hours  finasteride 5 milliGRAM(s) Oral daily  gabapentin 400 milliGRAM(s) Oral two times a day  glucagon  Injectable 1 milliGRAM(s) IntraMuscular once  insulin lispro (ADMELOG) corrective regimen sliding scale   SubCutaneous three times a day before meals  ketorolac   Injectable 15 milliGRAM(s) IV Push every 6 hours  lactated ringers. 1000 milliLiter(s) (125 mL/Hr) IV Continuous <Continuous>  tamsulosin 0.4 milliGRAM(s) Oral at bedtime    MEDICATIONS  (PRN):  acetaminophen     Tablet .. 650 milliGRAM(s) Oral every 6 hours PRN Temp greater or equal to 38C (100.4F), Mild Pain (1 - 3)  ALPRAZolam 2 milliGRAM(s) Oral two times a day PRN anxiety  dextrose Oral Gel 15 Gram(s) Oral once PRN Blood Glucose LESS THAN 70 milliGRAM(s)/deciliter  ondansetron Injectable 4 milliGRAM(s) IV Push every 6 hours PRN Nausea  oxyCODONE    IR 10 milliGRAM(s) Oral every 4 hours PRN Severe Pain (7 - 10)  oxyCODONE    IR 5 milliGRAM(s) Oral every 4 hours PRN Moderate Pain (4 - 6)      VITALS:  T(F): 98.1 (05-26-22 @ 08:35), Max: 98.1 (05-26-22 @ 05:46)  HR: 70 (05-26-22 @ 08:35) (56 - 80)  BP: 128/82 (05-26-22 @ 08:35) (119/68 - 163/98)  RR: 20 (05-26-22 @ 08:35) (16 - 22)  SpO2: 91% (05-26-22 @ 08:35) (90% - 96%)    Blood Gas Profile - Arterial (05.26.22 @ 13:36)   pH, Arterial: 7.20:    pCO2, Arterial: 90:  pO2, Arterial: 58 mmHg   HCO3, Arterial: 35 mmol/L   Base Excess, Arterial: 4.0 mmol/L   Oxygen Saturation, Arterial: 90 %   Total CO2, Arterial: 38 mmol/L       CAPILLARY BLOOD GLUCOSE    POCT Blood Glucose.: 155 mg/dL (05.26.22 @ 12:08)   POCT Blood Glucose.: 137 mg/dL (26 May 2022 08:12)  POCT Blood Glucose.: 131 mg/dL (25 May 2022 22:44)  POCT Blood Glucose.: 110 mg/dL (25 May 2022 11:31)    PHYSICAL EXAM:    GENERAL: Comfortable, no acute distress   HEAD:  Normocephalic, atraumatic  EYES: EOMI, PERRLA  HEENT: Moist mucous membranes  NECK: Supple, No JVD  NERVOUS SYSTEM:  lethargic, not following commands, falling asleep when speaking  CHEST/LUNG: diminished, O 2 sats 88% on 6LNC  HEART: Regular rate and rhythm  ABDOMEN: Soft, obese, abd dsg intact,  Bowel sounds hypoactive  GENITOURINARY: Voiding, no palpable bladder  EXTREMITIES:   No clubbing, cyanosis, or edema  MUSCULOSKELETAL- No muscle tenderness, no joint tenderness  SKIN-no rash        LABS:                        13.3   13.48 )-----------( 248      ( 26 May 2022 08:00 )             45.3     05-26    138  |  104  |  23  ----------------------------<  162<H>  4.4   |  30  |  1.60<H>    Ca    8.6      26 May 2022 08:00  Phos  4.9     05-26  Mg     2.2     05-26          IMPRESSION: 55 yo male with above pmh. a/w:    # h/o chronic diverticulitis, ileostomy 11/2021  # S/p reversal of ileostomy  pain control: oxycodone  IVF's  Monitor I& O  Monitor Cbc, BMP  BGM per CRS protocol  Cont Abxs  diet per crs  Enterag      #hypoxia  RAFFI, h/o non compliance with cpap  O2 sats 88% on 6L  Stat ABG: Resp acidosis stat abg: PH 7.20, PCO2 90  stat bipap  pulm consult    #leukocytosis  likely reactive  afebrile  monitor wbc    #HTN  cont bb    #HLD  cont statin    #BPH  cont finasteride/flomax    #DM, type 2  BGM/ss  hold OHAS    #neuropathy  cont gabapentin    #VTE prophylaxis  hep sq  Venodynes  Amb    paty you for the consult, will follow with you

## 2022-05-26 NOTE — PROGRESS NOTE ADULT - ATTENDING COMMENTS
Patient seen and examined this morning. Patient is POD#1. Patient is tolerating clear liquids, reports small intake this morning, denies N/V, no bowel function at this time. Abdomen is obese, soft, appropriately tender, mildly distended, requiring 2L NC this morning to maintain saturations    Vital Signs Last 24 Hrs  T(C): 36.7 (26 May 2022 16:05), Max: 36.7 (26 May 2022 05:46)  T(F): 98.1 (26 May 2022 16:05), Max: 98.1 (26 May 2022 05:46)  HR: 81 (26 May 2022 16:05) (57 - 81)  BP: 141/73 (26 May 2022 16:05) (119/68 - 145/95)  BP(mean): 111 (25 May 2022 22:00) (111 - 111)  RR: 19 (26 May 2022 16:05) (16 - 22)  SpO2: 95% (26 May 2022 16:05) (90% - 95%)                          13.3   13.48 )-----------( 248      ( 26 May 2022 08:00 )             45.3     05-26    138  |  104  |  23  ----------------------------<  162<H>  4.4   |  30  |  1.60<H>    Ca    8.6      26 May 2022 08:00  Phos  4.9     05-26  Mg     2.2     05-26        Ensure adequate pain control  Monitor hemodynamic status  Supplemental O2 to maintain saturations > 92.  ADAT  Follow up for bowel function  Pulmonology evaluation  IS use 10/hr  Daily physical therapy, patient needs to be up and out of bed ambulating  Continue supportive care during recovery

## 2022-05-26 NOTE — CONSULT NOTE ADULT - NS ATTEND AMEND GEN_ALL_CORE FT
pt seen and examined earlier today with NP Alicia Sanchez. AGree with documentation as above with changes made where appropriate.  56M, with morbid obesity, HTN, DMII, RAFFI, diverticulitis, vo's procedure, reversal with ileostomy who is now admitted for ileostomy reversal.   He is pod#1.   lethargic on exam.   Per RN, has been lethargic, but more alert when she comes into the room.   Pt denies h/o Sleep apnea but very lethargic.   State abg confirms respiratory acidosis.   D/w Pulmonary, will start bipap 16/6 and repeat ABG in 1 hour.   dc celebrex with acute renal failure  dc narcotics d/t resp status.   continue rest of mx as outlined above.

## 2022-05-26 NOTE — CONSULT NOTE ADULT - ASSESSMENT
Laparoscopic reversal of ileostomy 25-May-2022 17:01:45  Sharda Conn. 55 yo male with above    PROBLEMS:    H/o chronic diverticulitis, ileostomy 11/202-S/p reversal of ileostomy  Hypoxaemic/ hypercapnic Respiratory failure/ PH 7.20, PCO2 90  RAFFI-non compliance with cpap  HTN  HLD  BPH  DM, type 2    PLAN:    pain control: oxycodone  BGM per CRS protocol  Change bipap 20/6-50%  Serial ABG  d/w staff in detail  VTE prophylaxis-hep ex-[id number]

## 2022-05-26 NOTE — PHYSICAL THERAPY INITIAL EVALUATION ADULT - PHYSICAL ASSIST/NONPHYSICAL ASSIST: GAIT, REHAB EVAL
Tactile cues needed for gait stability, proper step sequence and use of RW/verbal cues/1 person assist

## 2022-05-26 NOTE — RAPID RESPONSE TEAM SUMMARY - NSSITUATIONBACKGROUNDRRT_GEN_ALL_CORE
57 yo M with PMH of DM, HTN, CHF, b/l LE swelling, Obesity(BMI 49), h/o diverticulitis/diverticular abscess s/p left colostomy 07/2021 at [facility] was seen in the ED for abdominal pain. Pt reports he is suffering from this colostomy bag and wants it reversed. Denies fever or chills, n/v/d/c. Pt reports the colostomy is functional.    Subjective: Pt was seen and examined at bedside for an RRT initiated for agitiation and refusal to comply with his BIPAP machine. Pt is currently POD #1 s/p ileostomy reversal. he has a Hx of severe RAFFI and was found to be destaurating in PACU to 86% placed on Venti mask and upgraded to Tele. Most recent ABG revealed severe Acidosis with pH of 7.22 and BiCARB of 89. Pt currently coherent, alert and oriented and is amenable to complying with the mask until repeat ABG at 10pm where he may be upgraded to higher level of care for continuous BiPAP vs Mechanical Ventilation pending ABG results assessing severity of retention.

## 2022-05-26 NOTE — PHYSICAL THERAPY INITIAL EVALUATION ADULT - MODALITIES TREATMENT COMMENTS
Pt left supine in bed with HOB elevated in NAD with NC (6L/min), IV and abdominal drain all intact. CBIR. Pt instructed to not get up alone. Bed alarm activated. Patient set up for breakfast. DIPIKA Abel present and aware

## 2022-05-26 NOTE — PHYSICAL THERAPY INITIAL EVALUATION ADULT - LIVES WITH, PROFILE
Patient lives at home with his sister. Patient unable to report accurate home living situation information due to lethargy and drowsiness.

## 2022-05-26 NOTE — PHYSICAL THERAPY INITIAL EVALUATION ADULT - GAIT DISTANCE, PT EVAL
3 feet via side steps at EOB towards HOB - Ambulation Distance limited due to drowsiness, dizziness, lethargy, fatigue and general weakness

## 2022-05-26 NOTE — PHYSICAL THERAPY INITIAL EVALUATION ADULT - GENERAL OBSERVATIONS, REHAB EVAL
Pt rec'd supine in bed with NC (6L/min), IV and Abdmoninal Drain all Intact. Pt very difficult to awake and arouse due to sleep apnea. Required vigorous shaking and voice to awake/arouse according to RN Page. Pt denied any pain or discomfort at rest

## 2022-05-26 NOTE — CONSULT NOTE ADULT - SUBJECTIVE AND OBJECTIVE BOX
HPI:  56 y.o morbidly obese male presents to PST with hx of chronic diverticulitis. He reports multiple exacerbations over the last few years requiring surgery, colostomy  then ileostomy & sigmoid colectomy  in November 2021. He has followed with surgeon and now scheduled for Laparoscopic possible Open Reversal of Ileostomy   parastomal hernia repaired primarily  lysis of adhesionsLaparoscopic reversal of ileostomy 25-May-2022 17:01:45  Sharda Conn.  Patient became hypoxic to 88% O2 on room air yesterday postoperatively and was transferred to the telemetry unit for continuous pulse ox monitoring and oxygen therapy through venti mask. Surgery team was called because patient was refusing to wear his venti mask and refusing to keep the O2 monitor on. Patient felt he was being "forced" to wear it and was threatening to sign out AMA. Surgery team came to the bedside to speak with the patient and described the importance of the oxygen therapy given his desaturation. Was advised to keep the venti mask and O2 monitor on. Pulmonology Dr. Patterson was called to evaluate patient for treatment of destaturation in setting of pt with hx of RAFFI. Awaiting pulmonology consult recommendations.  PAST MEDICAL & SURGICAL HISTORY:  HTN (hypertension)      Diabetes      Diverticulitis      S/P colostomy  left, 7/2021    ileostomy - 11/2021      Obesity, morbid, BMI 40.0-49.9      Current smoker  1/4 pk x 30 yrs      Neuropathy      S/P colostomy  left 7/2021,      History of colon surgery  Open sigmoid resection, ileostomy --11/2021          Home Medications:  Admelog 100 units/mL injectable solution: Inject subcutaneously with meals per sliding scale as directed (25 May 2022 11:53)  ALPRAZolam 2 mg oral tablet: 1 tab(s) orally 2 times a day, As Needed (25 May 2022 11:53)  Aspirin Enteric Coated 81 mg oral delayed release tablet: 1 tab(s) orally once a day (25 May 2022 11:53)  atorvastatin 40 mg oral tablet: 1 tab(s) orally once a day (at bedtime) (25 May 2022 11:53)  carvedilol 12.5 mg oral tablet: 2 tab(s) orally 2 times a day (25 May 2022 11:53)  finasteride: 1 tab(s) orally once a day (25 May 2022 11:53)  furosemide 40 mg oral tablet: 1 tab(s) orally once a day (25 May 2022 11:53)  gabapentin 400 mg oral tablet: orally 2 times a day (25 May 2022 11:53)  metFORMIN 500 mg oral tablet: 1 tab(s) orally 2 times a day (25 May 2022 11:53)  Semglee Prefilled Pen 100 units/mL subcutaneous solution: 50 unit(s) subcutaneously once a day (at bedtime) (25 May 2022 11:53)  tamsulosin 0.4 mg oral capsule: 1 cap(s) orally once a day (25 May 2022 11:53)  Trulicity Pen 1.5 mg/0.5 mL subcutaneous solution: subcutaneous once a week, saturday (25 May 2022 11:53)      MEDICATIONS  (STANDING):  alvimopan 12 milliGRAM(s) Oral two times a day  aspirin enteric coated 81 milliGRAM(s) Oral daily  atorvastatin 40 milliGRAM(s) Oral at bedtime  carvedilol 25 milliGRAM(s) Oral every 12 hours  dextrose 5%. 1000 milliLiter(s) (100 mL/Hr) IV Continuous <Continuous>  dextrose 5%. 1000 milliLiter(s) (50 mL/Hr) IV Continuous <Continuous>  dextrose 50% Injectable 25 Gram(s) IV Push once  dextrose 50% Injectable 12.5 Gram(s) IV Push once  dextrose 50% Injectable 25 Gram(s) IV Push once  enoxaparin Injectable 40 milliGRAM(s) SubCutaneous every 24 hours  finasteride 5 milliGRAM(s) Oral daily  gabapentin 400 milliGRAM(s) Oral two times a day  glucagon  Injectable 1 milliGRAM(s) IntraMuscular once  insulin lispro (ADMELOG) corrective regimen sliding scale   SubCutaneous three times a day before meals  ketorolac   Injectable 15 milliGRAM(s) IV Push every 6 hours  lactated ringers. 1000 milliLiter(s) (125 mL/Hr) IV Continuous <Continuous>  tamsulosin 0.4 milliGRAM(s) Oral at bedtime    MEDICATIONS  (PRN):  acetaminophen     Tablet .. 650 milliGRAM(s) Oral every 6 hours PRN Temp greater or equal to 38C (100.4F), Mild Pain (1 - 3)  ALPRAZolam 2 milliGRAM(s) Oral two times a day PRN anxiety  dextrose Oral Gel 15 Gram(s) Oral once PRN Blood Glucose LESS THAN 70 milliGRAM(s)/deciliter  ondansetron Injectable 4 milliGRAM(s) IV Push every 6 hours PRN Nausea  oxyCODONE    IR 10 milliGRAM(s) Oral every 4 hours PRN Severe Pain (7 - 10)  oxyCODONE    IR 5 milliGRAM(s) Oral every 4 hours PRN Moderate Pain (4 - 6)      Allergies    No Known Allergies    Intolerances        SOCIAL HISTORY: Denies tobacco, etoh abuse or illicit drug use    FAMILY HISTORY:  FH: HTN (hypertension) (Father)    Family history of diabetes mellitus (DM) (Father, Mother, Sibling)        Vital Signs Last 24 Hrs  T(C): 36.7 (26 May 2022 05:46), Max: 36.7 (26 May 2022 05:46)  T(F): 98.1 (26 May 2022 05:46), Max: 98.1 (26 May 2022 05:46)  HR: 80 (26 May 2022 05:46) (56 - 80)  BP: 123/65 (26 May 2022 05:46) (119/68 - 163/98)  BP(mean): 111 (25 May 2022 22:00) (111 - 111)  RR: 20 (26 May 2022 05:46) (16 - 22)  SpO2: 94% (26 May 2022 05:46) (90% - 96%)        REVIEW OF SYSTEMS:    CONSTITUTIONAL:  As per HPI.  HEENT:  Eyes:  No diplopia or blurred vision. ENT:  No earache, sore throat or runny nose.  CARDIOVASCULAR:  No pressure, squeezing, tightness, heaviness or aching about the chest, neck, axilla or epigastrium.  RESPIRATORY:  No cough, shortness of breath, PND or orthopnea.  GASTROINTESTINAL:  No nausea, vomiting or diarrhea.  GENITOURINARY:  No dysuria, frequency or urgency.  MUSCULOSKELETAL:  As per HPI.  SKIN:  No change in skin, hair or nails.  NEUROLOGIC:  No paresthesias, fasciculations, seizures or weakness.  PSYCHIATRIC:  No disorder of thought or mood.  ENDOCRINE:  No heat or cold intolerance, polyuria or polydipsia.  HEMATOLOGICAL:  No easy bruising or bleedings:  .     PHYSICAL EXAMINATION:    GENERAL APPEARANCE:  Pt. is not currently dyspneic, in no distress. Pt. is alert, oriented, and pleasant.  HEENT:  Pupils are normal and react normally. No icterus. Mucous membranes well colored.  NECK:  Supple. No lymphadenopathy. Jugular venous pressure not elevated. Carotids equal.   HEART:   The cardiac impulse has a normal quality. Regular. Normal S1 and S2. There are no murmurs, rubs or gallops noted  CHEST:  Chest is clear to auscultation. Normal respiratory effort.  ABDOMEN:  Soft and nontender.   EXTREMITIES:  There is no cyanosis, clubbing or edema.   SKIN:  No rash or significant lesions are noted.    LABS:                          RADIOLOGY & ADDITIONAL STUDIES:        HPI:    55 yo male with pmh: Morbid obesity, RAFFI, current everyday smoker, DM, type 2, htn, neuropathy, with h/o diverticulitis in July 2021, hartmanns procedure with colostomy, in November pt was admitted for planned colostomy reversal.  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 Pt was supposed to have reversal in february, however, his A1C was > 9, so surgery was postponed, pt now s/p Ileostomy reversal,  post op he desatted to the 80's and was transferred to [facility] for closer monitoring. seen at bedside, on 02 6LNC, unable to stay awake when speaking, unable to answer questions, seems somewhat confused, concern for co2 retention, o2 sat 87%, stat abg drawn, pt in resp acidosis with  PH 7.20, PCO@ 90,  Bipap ordered stat.        PAST MEDICAL & SURGICAL HISTORY:  HTN (hypertension)      Diabetes      Diverticulitis      S/P colostomy  left, 7/2021    ileostomy - 11/2021      Obesity, morbid, BMI 40.0-49.9      Current smoker  1/4 pk x 30 yrs      Neuropathy      S/P colostomy  left 7/2021,      History of colon surgery  Open sigmoid resection, ileostomy --11/2021          Home Medications:  Admelog 100 units/mL injectable solution: Inject subcutaneously with meals per sliding scale as directed (25 May 2022 11:53)  ALPRAZolam 2 mg oral tablet: 1 tab(s) orally 2 times a day, As Needed (25 May 2022 11:53)  Aspirin Enteric Coated 81 mg oral delayed release tablet: 1 tab(s) orally once a day (25 May 2022 11:53)  atorvastatin 40 mg oral tablet: 1 tab(s) orally once a day (at bedtime) (25 May 2022 11:53)  carvedilol 12.5 mg oral tablet: 2 tab(s) orally 2 times a day (25 May 2022 11:53)  finasteride: 1 tab(s) orally once a day (25 May 2022 11:53)  furosemide 40 mg oral tablet: 1 tab(s) orally once a day (25 May 2022 11:53)  gabapentin 400 mg oral tablet: orally 2 times a day (25 May 2022 11:53)  metFORMIN 500 mg oral tablet: 1 tab(s) orally 2 times a day (25 May 2022 11:53)  Semglee Prefilled Pen 100 units/mL subcutaneous solution: 50 unit(s) subcutaneously once a day (at bedtime) (25 May 2022 11:53)  tamsulosin 0.4 mg oral capsule: 1 cap(s) orally once a day (25 May 2022 11:53)  Trulicity Pen 1.5 mg/0.5 mL subcutaneous solution: subcutaneous once a week, saturday (25 May 2022 11:53)      MEDICATIONS  (STANDING):  alvimopan 12 milliGRAM(s) Oral two times a day  aspirin enteric coated 81 milliGRAM(s) Oral daily  atorvastatin 40 milliGRAM(s) Oral at bedtime  carvedilol 25 milliGRAM(s) Oral every 12 hours  dextrose 5%. 1000 milliLiter(s) (100 mL/Hr) IV Continuous <Continuous>  dextrose 5%. 1000 milliLiter(s) (50 mL/Hr) IV Continuous <Continuous>  dextrose 50% Injectable 25 Gram(s) IV Push once  dextrose 50% Injectable 12.5 Gram(s) IV Push once  dextrose 50% Injectable 25 Gram(s) IV Push once  enoxaparin Injectable 40 milliGRAM(s) SubCutaneous every 24 hours  finasteride 5 milliGRAM(s) Oral daily  gabapentin 400 milliGRAM(s) Oral two times a day  glucagon  Injectable 1 milliGRAM(s) IntraMuscular once  insulin lispro (ADMELOG) corrective regimen sliding scale   SubCutaneous three times a day before meals  ketorolac   Injectable 15 milliGRAM(s) IV Push every 6 hours  lactated ringers. 1000 milliLiter(s) (125 mL/Hr) IV Continuous <Continuous>  tamsulosin 0.4 milliGRAM(s) Oral at bedtime    MEDICATIONS  (PRN):  acetaminophen     Tablet .. 650 milliGRAM(s) Oral every 6 hours PRN Temp greater or equal to 38C (100.4F), Mild Pain (1 - 3)  ALPRAZolam 2 milliGRAM(s) Oral two times a day PRN anxiety  dextrose Oral Gel 15 Gram(s) Oral once PRN Blood Glucose LESS THAN 70 milliGRAM(s)/deciliter  ondansetron Injectable 4 milliGRAM(s) IV Push every 6 hours PRN Nausea  oxyCODONE    IR 10 milliGRAM(s) Oral every 4 hours PRN Severe Pain (7 - 10)  oxyCODONE    IR 5 milliGRAM(s) Oral every 4 hours PRN Moderate Pain (4 - 6)      Allergies    No Known Allergies    Intolerances        SOCIAL HISTORY: Denies tobacco, etoh abuse or illicit drug use    FAMILY HISTORY:  FH: HTN (hypertension) (Father)    Family history of diabetes mellitus (DM) (Father, Mother, Sibling)        Vital Signs Last 24 Hrs  T(C): 36.7 (26 May 2022 05:46), Max: 36.7 (26 May 2022 05:46)  T(F): 98.1 (26 May 2022 05:46), Max: 98.1 (26 May 2022 05:46)  HR: 80 (26 May 2022 05:46) (56 - 80)  BP: 123/65 (26 May 2022 05:46) (119/68 - 163/98)  BP(mean): 111 (25 May 2022 22:00) (111 - 111)  RR: 20 (26 May 2022 05:46) (16 - 22)  SpO2: 94% (26 May 2022 05:46) (90% - 96%)        REVIEW OF SYSTEMS:    CONSTITUTIONAL:  As per HPI.  HEENT:  Eyes:  No diplopia or blurred vision. ENT:  No earache, sore throat or runny nose.  CARDIOVASCULAR:  No pressure, squeezing, tightness, heaviness or aching about the chest, neck, axilla or epigastrium.  RESPIRATORY:  No cough, shortness of breath, PND or orthopnea.  GASTROINTESTINAL:  No nausea, vomiting or diarrhea.  GENITOURINARY:  No dysuria, frequency or urgency.  MUSCULOSKELETAL:  As per HPI.  SKIN:  No change in skin, hair or nails.  NEUROLOGIC:  No paresthesias, fasciculations, seizures or weakness.  PSYCHIATRIC:  No disorder of thought or mood.  ENDOCRINE:  No heat or cold intolerance, polyuria or polydipsia.  HEMATOLOGICAL:  No easy bruising or bleedings:  .     PHYSICAL EXAMINATION:    GENERAL APPEARANCE:  Pt. is not currently dyspneic, in no distress. Pt. is alert, oriented, and pleasant.  HEENT:  Pupils are normal and react normally. No icterus. Mucous membranes well colored.  NECK:  Supple. No lymphadenopathy. Jugular venous pressure not elevated. Carotids equal.   HEART:   The cardiac impulse has a normal quality. Regular. Normal S1 and S2. There are no murmurs, rubs or gallops noted  CHEST:  Chest is clear to auscultation. Normal respiratory effort.  ABDOMEN:  Soft and nontender.   EXTREMITIES:  There is no cyanosis, clubbing or edema.   SKIN:  No rash or significant lesions are noted.    LABS:                          RADIOLOGY & ADDITIONAL STUDIES:        HPI:    57 yo male with pmh: Morbid obesity, RAFFI, current everyday smoker, DM, type 2, htn, neuropathy, with h/o diverticulitis in July 2021, hartmanns procedure with colostomy, in November pt was admitted for planned colostomy reversal.  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 Pt was supposed to have reversal in february, however, his A1C was > 9, so surgery was postponed, pt now s/p Ileostomy reversal,  post op he desatted to the 80's and was transferred to [facility] for closer monitoring. seen at bedside, on 02 6LNC, unable to stay awake when speaking, unable to answer questions, seems somewhat confused, concern for co2 retention, o2 sat 87%, stat abg drawn, pt in resp acidosis with  PH 7.20, PCO@ 90,  Bipap 16/8. pat was seen first early, when he was totally awake & alert & was refusing oxygen/pulse ox monitoring & does not wants any cpap if needed. on bipap fu ABG - ( 26 May 2022 16:36 ) pH, Arterial: 7.22  pH, Blood: x     /  pCO2: 86    /  pO2: 61    / HCO3: 35    / Base Excess: 4.5   /  SaO2: 89        PAST MEDICAL & SURGICAL HISTORY:  HTN (hypertension)      Diabetes      Diverticulitis      S/P colostomy  left, 7/2021    ileostomy - 11/2021      Obesity, morbid, BMI 40.0-49.9      Current smoker  1/4 pk x 30 yrs      Neuropathy      S/P colostomy  left 7/2021,      History of colon surgery  Open sigmoid resection, ileostomy --11/2021          Home Medications:  Admelog 100 units/mL injectable solution: Inject subcutaneously with meals per sliding scale as directed (25 May 2022 11:53)  ALPRAZolam 2 mg oral tablet: 1 tab(s) orally 2 times a day, As Needed (25 May 2022 11:53)  Aspirin Enteric Coated 81 mg oral delayed release tablet: 1 tab(s) orally once a day (25 May 2022 11:53)  atorvastatin 40 mg oral tablet: 1 tab(s) orally once a day (at bedtime) (25 May 2022 11:53)  carvedilol 12.5 mg oral tablet: 2 tab(s) orally 2 times a day (25 May 2022 11:53)  finasteride: 1 tab(s) orally once a day (25 May 2022 11:53)  furosemide 40 mg oral tablet: 1 tab(s) orally once a day (25 May 2022 11:53)  gabapentin 400 mg oral tablet: orally 2 times a day (25 May 2022 11:53)  metFORMIN 500 mg oral tablet: 1 tab(s) orally 2 times a day (25 May 2022 11:53)  Semglee Prefilled Pen 100 units/mL subcutaneous solution: 50 unit(s) subcutaneously once a day (at bedtime) (25 May 2022 11:53)  tamsulosin 0.4 mg oral capsule: 1 cap(s) orally once a day (25 May 2022 11:53)  Trulicity Pen 1.5 mg/0.5 mL subcutaneous solution: subcutaneous once a week, saturday (25 May 2022 11:53)      MEDICATIONS  (STANDING):  alvimopan 12 milliGRAM(s) Oral two times a day  aspirin enteric coated 81 milliGRAM(s) Oral daily  atorvastatin 40 milliGRAM(s) Oral at bedtime  carvedilol 25 milliGRAM(s) Oral every 12 hours  dextrose 5%. 1000 milliLiter(s) (100 mL/Hr) IV Continuous <Continuous>  dextrose 5%. 1000 milliLiter(s) (50 mL/Hr) IV Continuous <Continuous>  dextrose 50% Injectable 25 Gram(s) IV Push once  dextrose 50% Injectable 12.5 Gram(s) IV Push once  dextrose 50% Injectable 25 Gram(s) IV Push once  enoxaparin Injectable 40 milliGRAM(s) SubCutaneous every 24 hours  finasteride 5 milliGRAM(s) Oral daily  gabapentin 400 milliGRAM(s) Oral two times a day  glucagon  Injectable 1 milliGRAM(s) IntraMuscular once  insulin lispro (ADMELOG) corrective regimen sliding scale   SubCutaneous three times a day before meals  ketorolac   Injectable 15 milliGRAM(s) IV Push every 6 hours  lactated ringers. 1000 milliLiter(s) (125 mL/Hr) IV Continuous <Continuous>  tamsulosin 0.4 milliGRAM(s) Oral at bedtime    MEDICATIONS  (PRN):  acetaminophen     Tablet .. 650 milliGRAM(s) Oral every 6 hours PRN Temp greater or equal to 38C (100.4F), Mild Pain (1 - 3)  ALPRAZolam 2 milliGRAM(s) Oral two times a day PRN anxiety  dextrose Oral Gel 15 Gram(s) Oral once PRN Blood Glucose LESS THAN 70 milliGRAM(s)/deciliter  ondansetron Injectable 4 milliGRAM(s) IV Push every 6 hours PRN Nausea  oxyCODONE    IR 10 milliGRAM(s) Oral every 4 hours PRN Severe Pain (7 - 10)  oxyCODONE    IR 5 milliGRAM(s) Oral every 4 hours PRN Moderate Pain (4 - 6)      Allergies    No Known Allergies    Intolerances        SOCIAL HISTORY: Denies tobacco, etoh abuse or illicit drug use    FAMILY HISTORY:  FH: HTN (hypertension) (Father)    Family history of diabetes mellitus (DM) (Father, Mother, Sibling)        Vital Signs Last 24 Hrs  T(C): 36.7 (26 May 2022 05:46), Max: 36.7 (26 May 2022 05:46)  T(F): 98.1 (26 May 2022 05:46), Max: 98.1 (26 May 2022 05:46)  HR: 80 (26 May 2022 05:46) (56 - 80)  BP: 123/65 (26 May 2022 05:46) (119/68 - 163/98)  BP(mean): 111 (25 May 2022 22:00) (111 - 111)  RR: 20 (26 May 2022 05:46) (16 - 22)  SpO2: 94% (26 May 2022 05:46) (90% - 96%)        REVIEW OF SYSTEMS:    CONSTITUTIONAL:  As per HPI.  HEENT:  Eyes:  No diplopia or blurred vision. ENT:  No earache, sore throat or runny nose.  CARDIOVASCULAR:  No pressure, squeezing, tightness, heaviness or aching about the chest, neck, axilla or epigastrium.  RESPIRATORY:  No cough, +shortness of breath, PND or orthopnea.  GASTROINTESTINAL:  No nausea, vomiting or diarrhea.  GENITOURINARY:  No dysuria, frequency or urgency.  MUSCULOSKELETAL:  As per HPI.  SKIN:  No change in skin, hair or nails.  NEUROLOGIC:  No paresthesias, fasciculations, seizures or weakness.  PSYCHIATRIC:  No disorder of thought or mood.  ENDOCRINE:  No heat or cold intolerance, polyuria or polydipsia.  HEMATOLOGICAL:  No easy bruising or bleedings:  .     PHYSICAL EXAMINATION:    GENERAL APPEARANCE:  Pt. is not currently dyspneic, in no distress. Pt. is alert, oriented, and pleasant.  HEENT:  Pupils are normal and react normally. No icterus. Mucous membranes well colored.  NECK:  Supple. No lymphadenopathy. Jugular venous pressure not elevated. Carotids equal.   HEART:   The cardiac impulse has a normal quality. Regular. Normal S1 and S2. There are no murmurs, rubs or gallops noted  CHEST:  Chest is clear to auscultation. Normal respiratory effort.  ABDOMEN:  Soft and nontender.   EXTREMITIES:  There is no cyanosis, clubbing or edema.   SKIN:  No rash or significant lesions are noted.    LABS:                          RADIOLOGY & ADDITIONAL STUDIES:

## 2022-05-27 ENCOUNTER — TRANSCRIPTION ENCOUNTER (OUTPATIENT)
Age: 57
End: 2022-05-27

## 2022-05-27 LAB
ANION GAP SERPL CALC-SCNC: 3 MMOL/L — LOW (ref 5–17)
BASE EXCESS BLDA CALC-SCNC: 4.8 MMOL/L — HIGH (ref -2–3)
BLOOD GAS COMMENTS ARTERIAL: SIGNIFICANT CHANGE UP
BUN SERPL-MCNC: 13 MG/DL — SIGNIFICANT CHANGE UP (ref 7–23)
CALCIUM SERPL-MCNC: 8.8 MG/DL — SIGNIFICANT CHANGE UP (ref 8.5–10.1)
CHLORIDE SERPL-SCNC: 104 MMOL/L — SIGNIFICANT CHANGE UP (ref 96–108)
CO2 BLDA-SCNC: 34 MMOL/L — HIGH (ref 19–24)
CO2 SERPL-SCNC: 31 MMOL/L — SIGNIFICANT CHANGE UP (ref 22–31)
CREAT SERPL-MCNC: 0.96 MG/DL — SIGNIFICANT CHANGE UP (ref 0.5–1.3)
EGFR: 94 ML/MIN/1.73M2 — SIGNIFICANT CHANGE UP
GLUCOSE SERPL-MCNC: 136 MG/DL — HIGH (ref 70–99)
HCO3 BLDA-SCNC: 32 MMOL/L — HIGH (ref 21–28)
HCT VFR BLD CALC: 38.6 % — LOW (ref 39–50)
HGB BLD-MCNC: 11.3 G/DL — LOW (ref 13–17)
MAGNESIUM SERPL-MCNC: 2.3 MG/DL — SIGNIFICANT CHANGE UP (ref 1.6–2.6)
MCHC RBC-ENTMCNC: 25.1 PG — LOW (ref 27–34)
MCHC RBC-ENTMCNC: 29.3 GM/DL — LOW (ref 32–36)
MCV RBC AUTO: 85.6 FL — SIGNIFICANT CHANGE UP (ref 80–100)
PCO2 BLDA: 60 MMHG — HIGH (ref 35–48)
PH BLDA: 7.34 — LOW (ref 7.35–7.45)
PHOSPHATE SERPL-MCNC: 1.7 MG/DL — LOW (ref 2.5–4.5)
PLATELET # BLD AUTO: 166 K/UL — SIGNIFICANT CHANGE UP (ref 150–400)
PO2 BLDA: 83 MMHG — SIGNIFICANT CHANGE UP (ref 83–108)
POTASSIUM SERPL-MCNC: 4.4 MMOL/L — SIGNIFICANT CHANGE UP (ref 3.5–5.3)
POTASSIUM SERPL-SCNC: 4.4 MMOL/L — SIGNIFICANT CHANGE UP (ref 3.5–5.3)
RBC # BLD: 4.51 M/UL — SIGNIFICANT CHANGE UP (ref 4.2–5.8)
RBC # FLD: 19.3 % — HIGH (ref 10.3–14.5)
SAO2 % BLDA: 97 % — SIGNIFICANT CHANGE UP
SODIUM SERPL-SCNC: 138 MMOL/L — SIGNIFICANT CHANGE UP (ref 135–145)
WBC # BLD: 9.78 K/UL — SIGNIFICANT CHANGE UP (ref 3.8–10.5)
WBC # FLD AUTO: 9.78 K/UL — SIGNIFICANT CHANGE UP (ref 3.8–10.5)

## 2022-05-27 PROCEDURE — 99233 SBSQ HOSP IP/OBS HIGH 50: CPT

## 2022-05-27 RX ORDER — OXYCODONE HYDROCHLORIDE 5 MG/1
10 TABLET ORAL EVERY 6 HOURS
Refills: 0 | Status: DISCONTINUED | OUTPATIENT
Start: 2022-05-27 | End: 2022-05-28

## 2022-05-27 RX ORDER — OXYCODONE HYDROCHLORIDE 5 MG/1
5 TABLET ORAL EVERY 6 HOURS
Refills: 0 | Status: DISCONTINUED | OUTPATIENT
Start: 2022-05-27 | End: 2022-05-28

## 2022-05-27 RX ORDER — ALPRAZOLAM 0.25 MG
1 TABLET ORAL
Qty: 0 | Refills: 0 | DISCHARGE

## 2022-05-27 RX ORDER — POTASSIUM PHOSPHATE, MONOBASIC POTASSIUM PHOSPHATE, DIBASIC 236; 224 MG/ML; MG/ML
30 INJECTION, SOLUTION INTRAVENOUS ONCE
Refills: 0 | Status: DISCONTINUED | OUTPATIENT
Start: 2022-05-27 | End: 2022-05-27

## 2022-05-27 RX ADMIN — ENOXAPARIN SODIUM 40 MILLIGRAM(S): 100 INJECTION SUBCUTANEOUS at 05:09

## 2022-05-27 RX ADMIN — ALVIMOPAN 12 MILLIGRAM(S): 12 CAPSULE ORAL at 05:09

## 2022-05-27 RX ADMIN — Medication 85 MILLIMOLE(S): at 09:47

## 2022-05-27 RX ADMIN — CARVEDILOL PHOSPHATE 25 MILLIGRAM(S): 80 CAPSULE, EXTENDED RELEASE ORAL at 09:44

## 2022-05-27 RX ADMIN — TAMSULOSIN HYDROCHLORIDE 0.4 MILLIGRAM(S): 0.4 CAPSULE ORAL at 21:39

## 2022-05-27 RX ADMIN — ATORVASTATIN CALCIUM 40 MILLIGRAM(S): 80 TABLET, FILM COATED ORAL at 21:39

## 2022-05-27 RX ADMIN — OXYCODONE HYDROCHLORIDE 10 MILLIGRAM(S): 5 TABLET ORAL at 17:15

## 2022-05-27 RX ADMIN — FINASTERIDE 5 MILLIGRAM(S): 5 TABLET, FILM COATED ORAL at 09:45

## 2022-05-27 RX ADMIN — Medication 650 MILLIGRAM(S): at 10:10

## 2022-05-27 RX ADMIN — GABAPENTIN 400 MILLIGRAM(S): 400 CAPSULE ORAL at 21:39

## 2022-05-27 RX ADMIN — OXYCODONE HYDROCHLORIDE 10 MILLIGRAM(S): 5 TABLET ORAL at 18:15

## 2022-05-27 RX ADMIN — GABAPENTIN 400 MILLIGRAM(S): 400 CAPSULE ORAL at 09:47

## 2022-05-27 RX ADMIN — Medication 81 MILLIGRAM(S): at 09:44

## 2022-05-27 RX ADMIN — CARVEDILOL PHOSPHATE 25 MILLIGRAM(S): 80 CAPSULE, EXTENDED RELEASE ORAL at 21:39

## 2022-05-27 RX ADMIN — Medication 650 MILLIGRAM(S): at 11:10

## 2022-05-27 NOTE — DISCHARGE NOTE PROVIDER - HOSPITAL COURSE
Pt underwent an elective ileostomy reversal. Postop he had   Hypoxaemic/ hypercapnic Respiratory failure requiring telemetry , O2 sat monitoring, bipap, pulmonary consult.  He had return of bowel function, tolerating diet.   He needs pulm, pcp outpt follow up for cpap therapy.

## 2022-05-27 NOTE — DISCHARGE NOTE PROVIDER - NSDCCPCAREPLAN_GEN_ALL_CORE_FT
PRINCIPAL DISCHARGE DIAGNOSIS  Diagnosis: Ileostomy present  Assessment and Plan of Treatment:

## 2022-05-27 NOTE — DISCHARGE NOTE PROVIDER - NSDCMRMEDTOKEN_GEN_ALL_CORE_FT
Admelog 100 units/mL injectable solution: Inject subcutaneously with meals per sliding scale as directed  Aspirin Enteric Coated 81 mg oral delayed release tablet: 1 tab(s) orally once a day  atorvastatin 40 mg oral tablet: 1 tab(s) orally once a day (at bedtime)  carvedilol 12.5 mg oral tablet: 2 tab(s) orally 2 times a day  finasteride: 1 tab(s) orally once a day  furosemide 40 mg oral tablet: 1 tab(s) orally once a day  gabapentin 400 mg oral tablet: orally 2 times a day  metFORMIN 500 mg oral tablet: 1 tab(s) orally 2 times a day  oxycodone-acetaminophen 5 mg-325 mg oral tablet: 1 tab(s) orally every 6 hours, As Needed -for moderate pain [id number]  Semglee Prefilled Pen 100 units/mL subcutaneous solution: 50 unit(s) subcutaneously once a day (at bedtime)  tamsulosin 0.4 mg oral capsule: 1 cap(s) orally once a day  Trulicity Pen 1.5 mg/0.5 mL subcutaneous solution: subcutaneous once a week, saturday

## 2022-05-27 NOTE — DISCHARGE NOTE PROVIDER - CARE PROVIDER_API CALL
Leif Lagos)  ColonRectal Surgery; Surgery  [address], [address]  [place] [address]  Phone: [phone]  Fax: [phone]  Follow Up Time: 2 weeks

## 2022-05-27 NOTE — PROGRESS NOTE ADULT - SUBJECTIVE AND OBJECTIVE BOX
Subjective:    pat better, much awake & alert, ABG - ( 27 May 2022 09:54 )  pH, Arterial: 7.34  pH, Blood: x     /  pCO2: 60    /  pO2: 83    / HCO3: 32    / Base Excess: 4.8   /  SaO2: 97        Home Medications:  Admelog 100 units/mL injectable solution: Inject subcutaneously with meals per sliding scale as directed (25 May 2022 11:53)  Aspirin Enteric Coated 81 mg oral delayed release tablet: 1 tab(s) orally once a day (25 May 2022 11:53)  atorvastatin 40 mg oral tablet: 1 tab(s) orally once a day (at bedtime) (25 May 2022 11:53)  carvedilol 12.5 mg oral tablet: 2 tab(s) orally 2 times a day (25 May 2022 11:53)  finasteride: 1 tab(s) orally once a day (25 May 2022 11:53)  furosemide 40 mg oral tablet: 1 tab(s) orally once a day (25 May 2022 11:53)  gabapentin 400 mg oral tablet: orally 2 times a day (25 May 2022 11:53)  metFORMIN 500 mg oral tablet: 1 tab(s) orally 2 times a day (25 May 2022 11:53)  Semglee Prefilled Pen 100 units/mL subcutaneous solution: 50 unit(s) subcutaneously once a day (at bedtime) (25 May 2022 11:53)  tamsulosin 0.4 mg oral capsule: 1 cap(s) orally once a day (25 May 2022 11:53)  Trulicity Pen 1.5 mg/0.5 mL subcutaneous solution: subcutaneous once a week, saturday (25 May 2022 11:53)    MEDICATIONS  (STANDING):  aspirin enteric coated 81 milliGRAM(s) Oral daily  atorvastatin 40 milliGRAM(s) Oral at bedtime  carvedilol 25 milliGRAM(s) Oral every 12 hours  dextrose 5%. 1000 milliLiter(s) (100 mL/Hr) IV Continuous <Continuous>  dextrose 5%. 1000 milliLiter(s) (50 mL/Hr) IV Continuous <Continuous>  dextrose 50% Injectable 25 Gram(s) IV Push once  dextrose 50% Injectable 12.5 Gram(s) IV Push once  dextrose 50% Injectable 25 Gram(s) IV Push once  enoxaparin Injectable 40 milliGRAM(s) SubCutaneous every 24 hours  finasteride 5 milliGRAM(s) Oral daily  gabapentin 400 milliGRAM(s) Oral two times a day  glucagon  Injectable 1 milliGRAM(s) IntraMuscular once  insulin lispro (ADMELOG) corrective regimen sliding scale   SubCutaneous three times a day before meals  tamsulosin 0.4 milliGRAM(s) Oral at bedtime    MEDICATIONS  (PRN):  acetaminophen     Tablet .. 650 milliGRAM(s) Oral every 6 hours PRN Temp greater or equal to 38C (100.4F), Mild Pain (1 - 3)  dextrose Oral Gel 15 Gram(s) Oral once PRN Blood Glucose LESS THAN 70 milliGRAM(s)/deciliter  ondansetron Injectable 4 milliGRAM(s) IV Push every 6 hours PRN Nausea  oxyCODONE    IR 5 milliGRAM(s) Oral every 6 hours PRN Moderate Pain (4 - 6)  oxyCODONE    IR 10 milliGRAM(s) Oral every 6 hours PRN Severe Pain (7 - 10)      Allergies    No Known Allergies    Intolerances        Vital Signs Last 24 Hrs  T(C): --  T(F): --  HR: 80 (27 May 2022 08:15) (80 - 80)  BP: 139/86 (27 May 2022 08:15) (139/86 - 139/86)  BP(mean): --  RR: 19 (27 May 2022 08:15) (19 - 19)  SpO2: 92% (27 May 2022 08:15) (92% - 92%)      PHYSICAL EXAMINATION:    NECK:  Supple. No lymphadenopathy. Jugular venous pressure not elevated. Carotids equal.   HEART:   The cardiac impulse has a normal quality. Reg., Nl S1 and S2.  There are no murmurs, rubs or gallops noted  CHEST:  Chest crackles to auscultation. Normal respiratory effort.  ABDOMEN:  Soft and nontender.   EXTREMITIES:  There is no edema.       LABS:                        11.3   9.78  )-----------( 166      ( 27 May 2022 07:47 )             38.6     05-27    138  |  104  |  13  ----------------------------<  136<H>  4.4   |  31  |  0.96    Ca    8.8      27 May 2022 07:47  Phos  1.7     05-27  Mg     2.3     05-27

## 2022-05-27 NOTE — PROGRESS NOTE ADULT - ATTENDING COMMENTS
Patient seen and examined this morning. Patient is POD#1. Patient is tolerating clear liquids, reports small intake this morning, denies N/V, no bowel function at this time. Abdomen is obese, soft, appropriately tender, mildly distended, requiring 2L NC this morning to maintain saturations    Vital Signs Last 24 Hrs  T(C): 36.7 (26 May 2022 16:05), Max: 36.7 (26 May 2022 05:46)  T(F): 98.1 (26 May 2022 16:05), Max: 98.1 (26 May 2022 05:46)  HR: 81 (26 May 2022 16:05) (57 - 81)  BP: 141/73 (26 May 2022 16:05) (119/68 - 145/95)  BP(mean): 111 (25 May 2022 22:00) (111 - 111)  RR: 19 (26 May 2022 16:05) (16 - 22)  SpO2: 95% (26 May 2022 16:05) (90% - 95%)                          13.3   13.48 )-----------( 248      ( 26 May 2022 08:00 )             45.3     05-26    138  |  104  |  23  ----------------------------<  162<H>  4.4   |  30  |  1.60<H>    Ca    8.6      26 May 2022 08:00  Phos  4.9     05-26  Mg     2.2     05-26        Ensure adequate pain control  Monitor hemodynamic status  Supplemental O2 to maintain saturations > 92.  ADAT  Follow up for bowel function  Pulmonology evaluation  IS use 10/hr  Daily physical therapy, patient needs to be up and out of bed ambulating  Continue supportive care during recovery Patient seen and examined this morning. Patient is POD#2. Patient is tolerating full liquids, denies N/V, having bowel function at this time. Abdomen is obese, soft, appropriately tender, mildly distended.    ICU Vital Signs Last 24 Hrs  T(C): 36.7 (26 May 2022 16:05), Max: 36.7 (26 May 2022 16:05)  T(F): 98.1 (26 May 2022 16:05), Max: 98.1 (26 May 2022 16:05)  HR: 80 (27 May 2022 08:15) (80 - 81)  BP: 139/86 (27 May 2022 08:15) (139/86 - 141/73)  BP(mean): --  ABP: --  ABP(mean): --  RR: 19 (27 May 2022 08:15) (19 - 19)  SpO2: 92% (27 May 2022 08:15) (91% - 95%)                            11.3   9.78  )-----------( 166      ( 27 May 2022 07:47 )             38.6       Continue to ensure adequate pain control  Monitor hemodynamic status  Supplemental O2 to maintain saturations > 92.  Advance to low residue diet  Pulmonology evaluation - Follow up to determine if patient is stable from a pulm stand point for discharge  IS use 10/hr  Daily physical therapy, patient needs to be up and out of bed ambulating  Continue supportive care during recovery

## 2022-05-27 NOTE — PROGRESS NOTE ADULT - SUBJECTIVE AND OBJECTIVE BOX
PCP: Dr. NATE Sommer    CHIEF COMPLAINT: ileostomy reversal    HPI: 57 yo male with pmh: Morbid obesity, ARFFI, current everyday smoker, DM, type 2, htn, neuropathy, with h/o diverticulitis in July 2021, hartmanns procedure with colostomy, in November pt was admitted for planned colostomy reversal.  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 Pt was supposed to have reversal in february, however, his A1C was > 9, so surgery was postponed, pt presented yesterday for planned Ileostomy reversal,  post op he desatted to the 80's and was transferred to [facility] for closer monitoring. seen at bedside, on 02 6LNC, unable to stay awake when speaking, unable to answer questions, seems somewhat confused, concern for co2 retention, o2 sat 87%, stat abg drawn, pt in resp acidosis with  PH 7.20, PCO@ 90,  Bipap ordered stat.   awaiting Pulmonary consult.  We are consulted for medical management      5/27: pt seen sitting on side of bed, awake, alert, feels better, does not recall events of yesterday, acknowledges needing bipap, ABG improved on 3 l NC,  c/o lower abd pain, awaiting pain meds, + BM: liquid brown x 2, ashley po, no n/v      REVIEW OF SYSTEMS:   All 10 systems reviewed in detailed and found to be negative with the exception of what has already been described above    MEDICATIONS  (STANDING):  aspirin enteric coated 81 milliGRAM(s) Oral daily  atorvastatin 40 milliGRAM(s) Oral at bedtime  carvedilol 25 milliGRAM(s) Oral every 12 hours  dextrose 5%. 1000 milliLiter(s) (100 mL/Hr) IV Continuous <Continuous>  dextrose 5%. 1000 milliLiter(s) (50 mL/Hr) IV Continuous <Continuous>  dextrose 50% Injectable 25 Gram(s) IV Push once  dextrose 50% Injectable 12.5 Gram(s) IV Push once  dextrose 50% Injectable 25 Gram(s) IV Push once  enoxaparin Injectable 40 milliGRAM(s) SubCutaneous every 24 hours  finasteride 5 milliGRAM(s) Oral daily  gabapentin 400 milliGRAM(s) Oral two times a day  glucagon  Injectable 1 milliGRAM(s) IntraMuscular once  insulin lispro (ADMELOG) corrective regimen sliding scale   SubCutaneous three times a day before meals  tamsulosin 0.4 milliGRAM(s) Oral at bedtime    MEDICATIONS  (PRN):  acetaminophen     Tablet .. 650 milliGRAM(s) Oral every 6 hours PRN Temp greater or equal to 38C (100.4F), Mild Pain (1 - 3)  dextrose Oral Gel 15 Gram(s) Oral once PRN Blood Glucose LESS THAN 70 milliGRAM(s)/deciliter  ondansetron Injectable 4 milliGRAM(s) IV Push every 6 hours PRN Nausea  oxyCODONE    IR 5 milliGRAM(s) Oral every 6 hours PRN Moderate Pain (4 - 6)  oxyCODONE    IR 10 milliGRAM(s) Oral every 6 hours PRN Severe Pain (7 - 10)    Vital Signs Last 24 Hrs  T(C): 36.7 (05-26-22 @ 16:05), Max: 36.7 (05-26-22 @ 16:05)  T(F): 98.1 (05-26-22 @ 16:05), Max: 98.1 (05-26-22 @ 16:05)  HR: 80 (05-27-22 @ 08:15) (80 - 81)  BP: 139/86 (05-27-22 @ 08:15) (139/86 - 141/73)  BP(mean): --  RR: 19 (05-27-22 @ 08:15) (19 - 19)  SpO2: 92% (05-27-22 @ 08:15) (92% - 95%)      CAPILLARY BLOOD GLUCOSE    POCT Blood Glucose.: 146 mg/dL (05.27.22 @ 11:50)   POCT Blood Glucose.: 137 mg/dL (05.27.22 @ 07:54)   POCT Blood Glucose.: 155 mg/dL (05.26.22 @ 12:08)   POCT Blood Glucose.: 137 mg/dL (26 May 2022 08:12)  POCT Blood Glucose.: 131 mg/dL (25 May 2022 22:44)  POCT Blood Glucose.: 110 mg/dL (25 May 2022 11:31)    PHYSICAL EXAM:    GENERAL: Comfortable, no acute distress   HEAD:  Normocephalic, atraumatic  EYES: EOMI, PERRLA  HEENT: Moist mucous membranes  NECK: Supple, No JVD  NERVOUS SYSTEM:  awake, alert, oriented x 4, 5/5 power of B/L upper and lower extremities  CHEST/LUNG: CTA, on 3 L NC  HEART: Regular rate and rhythm  ABDOMEN: Soft, morbidly obese, abd dsg intact,  lap sites c/d/i, Bowel sounds hypoactive  GENITOURINARY: Voiding, no palpable bladder  EXTREMITIES:   No clubbing, cyanosis, or edema  MUSCULOSKELETAL- No muscle tenderness, no joint tenderness  SKIN-no rash        LABS:                                         11.3   9.78  )-----------( 166      ( 27 May 2022 07:47 )             38.6       05-27    138  |  104  |  13  ----------------------------<  136<H>  4.4   |  31  |  0.96    Ca    8.8      27 May 2022 07:47  Phos  1.7     05-27  Mg     2.3     05-27          IMPRESSION: 57 yo male with above pmh. a/w:    # h/o chronic diverticulitis, ileostomy 11/2021  # S/p reversal of ileostomy  pain control: oxycodone  IVF's  Monitor I& O  Monitor Cbc, BMP  BGM per CRS protocol  Cont Abxs  diet per crs  Enterag      #hypoxia  RAFFI, h/o non compliance with cpap  O2 sats 92% on 3 L  ABG improved   bipap prn at night   pt will need to f/u with Pulmonary as out pt    #leukocytosis  resolved    #acute blood loss anemia  d/t surgery  no need for transfusions  monitor    #HTN  cont bb    #HLD  cont statin    #BPH  cont finasteride/flomax    #DM, type 2  BGM/ss  hold OHAS    #neuropathy  cont gabapentin    #VTE prophylaxis  lovenox  Venodynes  Amb    dispo: likely home 1-2 days           PCP: Dr. NATE Sommer    CHIEF COMPLAINT: ileostomy reversal    HPI: 57 yo male with pmh: Morbid obesity, RAFFI, current everyday smoker, DM, type 2, htn, neuropathy, with h/o diverticulitis in July 2021, hartmanns procedure with colostomy, in November pt was admitted for planned colostomy reversal.  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 Pt was supposed to have reversal in february, however, his A1C was > 9, so surgery was postponed, pt presented yesterday for planned Ileostomy reversal,  post op he desatted to the 80's and was transferred to [facility] for closer monitoring. seen at bedside, on 02 6LNC, unable to stay awake when speaking, unable to answer questions, seems somewhat confused, concern for co2 retention, o2 sat 87%, stat abg drawn, pt in resp acidosis with  PH 7.20, PCO@ 90,  Bipap ordered stat.   awaiting Pulmonary consult.  We are consulted for medical management      5/27: pt seen sitting on side of bed, awake, alert, feels better, does not recall events of yesterday, acknowledges needing bipap, ABG improved on 3 l NC,  c/o lower abd pain, awaiting pain meds, + BM: liquid brown x 2, ashley po, no n/v      REVIEW OF SYSTEMS:   All 10 systems reviewed in detailed and found to be negative with the exception of what has already been described above    MEDICATIONS  (STANDING):  aspirin enteric coated 81 milliGRAM(s) Oral daily  atorvastatin 40 milliGRAM(s) Oral at bedtime  carvedilol 25 milliGRAM(s) Oral every 12 hours  dextrose 5%. 1000 milliLiter(s) (100 mL/Hr) IV Continuous <Continuous>  dextrose 5%. 1000 milliLiter(s) (50 mL/Hr) IV Continuous <Continuous>  dextrose 50% Injectable 25 Gram(s) IV Push once  dextrose 50% Injectable 12.5 Gram(s) IV Push once  dextrose 50% Injectable 25 Gram(s) IV Push once  enoxaparin Injectable 40 milliGRAM(s) SubCutaneous every 24 hours  finasteride 5 milliGRAM(s) Oral daily  gabapentin 400 milliGRAM(s) Oral two times a day  glucagon  Injectable 1 milliGRAM(s) IntraMuscular once  insulin lispro (ADMELOG) corrective regimen sliding scale   SubCutaneous three times a day before meals  tamsulosin 0.4 milliGRAM(s) Oral at bedtime    MEDICATIONS  (PRN):  acetaminophen     Tablet .. 650 milliGRAM(s) Oral every 6 hours PRN Temp greater or equal to 38C (100.4F), Mild Pain (1 - 3)  dextrose Oral Gel 15 Gram(s) Oral once PRN Blood Glucose LESS THAN 70 milliGRAM(s)/deciliter  ondansetron Injectable 4 milliGRAM(s) IV Push every 6 hours PRN Nausea  oxyCODONE    IR 5 milliGRAM(s) Oral every 6 hours PRN Moderate Pain (4 - 6)  oxyCODONE    IR 10 milliGRAM(s) Oral every 6 hours PRN Severe Pain (7 - 10)    Vital Signs Last 24 Hrs  T(C): 36.7 (05-26-22 @ 16:05), Max: 36.7 (05-26-22 @ 16:05)  T(F): 98.1 (05-26-22 @ 16:05), Max: 98.1 (05-26-22 @ 16:05)  HR: 80 (05-27-22 @ 08:15) (80 - 81)  BP: 139/86 (05-27-22 @ 08:15) (139/86 - 141/73)  BP(mean): --  RR: 19 (05-27-22 @ 08:15) (19 - 19)  SpO2: 92% (05-27-22 @ 08:15) (92% - 95%)      CAPILLARY BLOOD GLUCOSE    POCT Blood Glucose.: 146 mg/dL (05.27.22 @ 11:50)   POCT Blood Glucose.: 137 mg/dL (05.27.22 @ 07:54)   POCT Blood Glucose.: 155 mg/dL (05.26.22 @ 12:08)   POCT Blood Glucose.: 137 mg/dL (26 May 2022 08:12)  POCT Blood Glucose.: 131 mg/dL (25 May 2022 22:44)  POCT Blood Glucose.: 110 mg/dL (25 May 2022 11:31)    PHYSICAL EXAM:    GENERAL: Comfortable, no acute distress   HEAD:  Normocephalic, atraumatic  EYES: EOMI, PERRLA  HEENT: Moist mucous membranes  NECK: Supple, No JVD  NERVOUS SYSTEM:  awake, alert, oriented x 4, 5/5 power of B/L upper and lower extremities  CHEST/LUNG: CTA, on 3 L NC  HEART: Regular rate and rhythm  ABDOMEN: Soft, morbidly obese, abd dsg intact,  lap sites c/d/i, Bowel sounds hypoactive  GENITOURINARY: Voiding, no palpable bladder  EXTREMITIES:   No clubbing, cyanosis, or edema  MUSCULOSKELETAL- No muscle tenderness, no joint tenderness  SKIN-no rash        LABS:                                         11.3   9.78  )-----------( 166      ( 27 May 2022 07:47 )             38.6       05-27    138  |  104  |  13  ----------------------------<  136<H>  4.4   |  31  |  0.96    Ca    8.8      27 May 2022 07:47  Phos  1.7     05-27  Mg     2.3     05-27          IMPRESSION: 57 yo male with above pmh. a/w:    # h/o chronic diverticulitis, ileostomy 11/2021  # S/p reversal of ileostomy  pain control: oxycodone  IVF's  Monitor I& O  Monitor Cbc, BMP  BGM per CRS protocol  Cont Abxs  diet per crs  Enterag      #hypoxia  RAFFI, h/o non compliance with cpap  O2 sats 92% on 3 L  ABG improved   bipap prn at night   pt will need to f/u with Pulmonary as out pt    #hypophosphatemia  replete    #leukocytosis  resolved    #acute blood loss anemia  d/t surgery  no need for transfusions  monitor    #HTN  cont bb    #HLD  cont statin    #BPH  cont finasteride/flomax    #DM, type 2  BGM/ss  hold OHAS    #neuropathy  cont gabapentin    #VTE prophylaxis  lovenox  Venodynes  Amb    dispo: likely home 1-2 days

## 2022-05-27 NOTE — PROGRESS NOTE ADULT - SUBJECTIVE AND OBJECTIVE BOX
Pt. seen and examined at bedside this morning. Patient denies fever, chest pain, SOB, nausea, vomiting, diarrhea or constipation. No acute events overnight.         Pt was seen and examined at bedside this morning with surgery team. No acute overnight events reported by nursing staff. Pt offers no new complaints at this time. Denies fever/cp/sob/n/v/d/c. Vitals stable.     T(C): 36.7 (05-26-22 @ 16:05), Max: 36.7 (05-26-22 @ 16:05)  HR: 80 (05-27-22 @ 08:15) (80 - 81)  BP: 139/86 (05-27-22 @ 08:15) (139/86 - 141/73)  RR: 19 (05-27-22 @ 08:15) (19 - 19)  SpO2: 92% (05-27-22 @ 08:15) (91% - 95%)    PHYSICAL EXAM:  Constitutional: NAD, GCS: 15/15  AOX3  Eyes:  WNL  ENMT:  WNL  Neck:  WNL, non tender  Back: Non tender  Respiratory: CTABL  Cardiovascular:  S1+S2+0  Gastrointestinal: Soft, ND , NT  Genitourinary:  WNL  Extremities: NV intact  Vascular:  Intact  Neurological: No focal neurological deficit,  CN, motor and sensory system grossly intact.  Skin: WNL  Musculoskeletal: WNL  Psychiatric: Grossly WNL                          11.3   9.78  )-----------( 166      ( 27 May 2022 07:47 )             38.6     05-27    138  |  104  |  13  ----------------------------<  136<H>  4.4   |  31  |  0.96    Ca    8.8      27 May 2022 07:47  Phos  1.7     05-27  Mg     2.3     05-27

## 2022-05-28 ENCOUNTER — TRANSCRIPTION ENCOUNTER (OUTPATIENT)
Age: 57
End: 2022-05-28

## 2022-05-28 VITALS
DIASTOLIC BLOOD PRESSURE: 78 MMHG | TEMPERATURE: 98 F | RESPIRATION RATE: 20 BRPM | HEART RATE: 72 BPM | OXYGEN SATURATION: 93 % | SYSTOLIC BLOOD PRESSURE: 136 MMHG

## 2022-05-28 LAB
ANION GAP SERPL CALC-SCNC: 1 MMOL/L — LOW (ref 5–17)
BUN SERPL-MCNC: 12 MG/DL — SIGNIFICANT CHANGE UP (ref 7–23)
CALCIUM SERPL-MCNC: 9.1 MG/DL — SIGNIFICANT CHANGE UP (ref 8.5–10.1)
CHLORIDE SERPL-SCNC: 104 MMOL/L — SIGNIFICANT CHANGE UP (ref 96–108)
CO2 SERPL-SCNC: 33 MMOL/L — HIGH (ref 22–31)
CREAT SERPL-MCNC: 1 MG/DL — SIGNIFICANT CHANGE UP (ref 0.5–1.3)
EGFR: 89 ML/MIN/1.73M2 — SIGNIFICANT CHANGE UP
GLUCOSE SERPL-MCNC: 152 MG/DL — HIGH (ref 70–99)
HCT VFR BLD CALC: 39.8 % — SIGNIFICANT CHANGE UP (ref 39–50)
HGB BLD-MCNC: 11.9 G/DL — LOW (ref 13–17)
MAGNESIUM SERPL-MCNC: 2.4 MG/DL — SIGNIFICANT CHANGE UP (ref 1.6–2.6)
MCHC RBC-ENTMCNC: 25.4 PG — LOW (ref 27–34)
MCHC RBC-ENTMCNC: 29.9 GM/DL — LOW (ref 32–36)
MCV RBC AUTO: 84.9 FL — SIGNIFICANT CHANGE UP (ref 80–100)
PHOSPHATE SERPL-MCNC: 1.5 MG/DL — LOW (ref 2.5–4.5)
PLATELET # BLD AUTO: 211 K/UL — SIGNIFICANT CHANGE UP (ref 150–400)
POTASSIUM SERPL-MCNC: 4.4 MMOL/L — SIGNIFICANT CHANGE UP (ref 3.5–5.3)
POTASSIUM SERPL-SCNC: 4.4 MMOL/L — SIGNIFICANT CHANGE UP (ref 3.5–5.3)
RBC # BLD: 4.69 M/UL — SIGNIFICANT CHANGE UP (ref 4.2–5.8)
RBC # FLD: 19.5 % — HIGH (ref 10.3–14.5)
SODIUM SERPL-SCNC: 138 MMOL/L — SIGNIFICANT CHANGE UP (ref 135–145)
WBC # BLD: 9.12 K/UL — SIGNIFICANT CHANGE UP (ref 3.8–10.5)
WBC # FLD AUTO: 9.12 K/UL — SIGNIFICANT CHANGE UP (ref 3.8–10.5)

## 2022-05-28 PROCEDURE — 99232 SBSQ HOSP IP/OBS MODERATE 35: CPT

## 2022-05-28 RX ADMIN — OXYCODONE HYDROCHLORIDE 10 MILLIGRAM(S): 5 TABLET ORAL at 05:21

## 2022-05-28 RX ADMIN — FINASTERIDE 5 MILLIGRAM(S): 5 TABLET, FILM COATED ORAL at 10:31

## 2022-05-28 RX ADMIN — CARVEDILOL PHOSPHATE 25 MILLIGRAM(S): 80 CAPSULE, EXTENDED RELEASE ORAL at 10:31

## 2022-05-28 RX ADMIN — Medication 81 MILLIGRAM(S): at 10:31

## 2022-05-28 RX ADMIN — GABAPENTIN 400 MILLIGRAM(S): 400 CAPSULE ORAL at 10:31

## 2022-05-28 RX ADMIN — Medication 1: at 09:00

## 2022-05-28 RX ADMIN — ENOXAPARIN SODIUM 40 MILLIGRAM(S): 100 INJECTION SUBCUTANEOUS at 05:21

## 2022-05-28 NOTE — PROGRESS NOTE ADULT - ASSESSMENT
56 M POD #3 s/p ileostomy reversal. Hx of severe RAFFI, desaturating in PACU to 86% placed on Venti mask and upgraded to Tele.   Refused to wear pulse ox/ mask overnight and threatened to AMA, rapid response called   Now compliant with BIPAP and saturating well    monitor vitals  C/W LRD  pain control   Fu pulm eval-- pending recs for home O2   ambulation  Incentive spirometer   dispo: home once home O2 set up    Pt was seen and discussed with Dr. Joshi, colorectal surgery attending  
57 yo male with above    PROBLEMS:    H/o chronic diverticulitis, ileostomy 11/202-S/p reversal of ileostomy  Hypoxaemic/ hypercapnic Respiratory failure/ PH 7.20, PCO2 90  RAFFI-non compliance with cpap  HTN  HLD  BPH  DM, type 2    PLAN:    pulmonary better-ambulate- decd planning  pain control: oxycodone  BGM per CRS protocol  Bipap 20/6-30%-qhs-needs sleep study as outpat  d/w staff in detail  VTE prophylaxis-hep on-[id number]
57 yo male with above    PROBLEMS:    H/o chronic diverticulitis, ileostomy 11/202-S/p reversal of ileostomy  Hypoxaemic/ hypercapnic Respiratory failure/ PH 7.20, PCO2 90  RAFFI-non compliance with cpap  HTN  HLD  BPH  DM, type 2    PLAN:    pulmonary better-ambulate- decd planning to fu in office  pain control: oxycodone  BGM per CRS protocol  Sleep study as outpat  d/w staff in detail  VTE prophylaxis-hep [id number]    
56 M POD #1 s/p ileostomy reversal. Hx of severe RAFFI, destaurating in PACU to 86% placed on Venti mask and upgraded to Tele.   Refused to wear pulse ox/ mask overnight and threatened to AMA,   No bowel function yet.     Plan:   f/u Dr. Patterson consult  f/u return of bowel function  ambulation  restart home meds  Incentive spirometer   ADAT    Plan discussed with Dr. Altamirano 
56 M POD #2 s/p ileostomy reversal. Hx of severe RAFFI, desaturating in PACU to 86% placed on Venti mask and upgraded to Tele.   Refused to wear pulse ox/ mask overnight and threatened to AMA, rapid response called     monitor vitals  C/W LRD  pain control   Fu pulm eval  ambulation  restart home meds  Incentive spirometer   ADAT    Pt was seen and discussed with Dr. Altamirano, colorectal surgery attending  WILLIAM PGY5

## 2022-05-28 NOTE — PROGRESS NOTE ADULT - SUBJECTIVE AND OBJECTIVE BOX
PCP: Dr. NATE Sommer    CHIEF COMPLAINT: ileostomy reversal    HPI: 57 yo male with pmh: Morbid obesity, RAFFI, current everyday smoker, DM, type 2, htn, neuropathy, with h/o diverticulitis in July 2021, hartmanns procedure with colostomy, in November pt was admitted for planned colostomy reversal.  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 Pt was supposed to have reversal in february, however, his A1C was > 9, so surgery was postponed, pt presented yesterday for planned Ileostomy reversal,  post op he desatted to the 80's and was transferred to [facility] for closer monitoring. seen at bedside, on 02 6LNC, unable to stay awake when speaking, unable to answer questions, seems somewhat confused, concern for co2 retention, o2 sat 87%, stat abg drawn, pt in resp acidosis with  PH 7.20, PCO@ 90,  Bipap ordered stat.   awaiting Pulmonary consult.  We are consulted for medical management    5/27: pt seen sitting on side of bed, awake, alert, feels better, does not recall events of yesterday, acknowledges needing bipap, ABG improved on 3 l NC,  c/o lower abd pain, awaiting pain meds, + BM: liquid brown x 2, ashley po, no n/v  5/28/22- +flatus, +BM. Tolerating solid diet. Wants to go home    REVIEW OF SYSTEMS:   All 10 systems reviewed in detailed and found to be negative with the exception of what has already been described above    MEDICATIONS  (STANDING):  aspirin enteric coated 81 milliGRAM(s) Oral daily  atorvastatin 40 milliGRAM(s) Oral at bedtime  carvedilol 25 milliGRAM(s) Oral every 12 hours  dextrose 5%. 1000 milliLiter(s) (100 mL/Hr) IV Continuous <Continuous>  dextrose 5%. 1000 milliLiter(s) (50 mL/Hr) IV Continuous <Continuous>  dextrose 50% Injectable 25 Gram(s) IV Push once  dextrose 50% Injectable 12.5 Gram(s) IV Push once  dextrose 50% Injectable 25 Gram(s) IV Push once  enoxaparin Injectable 40 milliGRAM(s) SubCutaneous every 24 hours  finasteride 5 milliGRAM(s) Oral daily  gabapentin 400 milliGRAM(s) Oral two times a day  glucagon  Injectable 1 milliGRAM(s) IntraMuscular once  insulin lispro (ADMELOG) corrective regimen sliding scale   SubCutaneous three times a day before meals  tamsulosin 0.4 milliGRAM(s) Oral at bedtime    MEDICATIONS  (PRN):  acetaminophen     Tablet .. 650 milliGRAM(s) Oral every 6 hours PRN Temp greater or equal to 38C (100.4F), Mild Pain (1 - 3)  dextrose Oral Gel 15 Gram(s) Oral once PRN Blood Glucose LESS THAN 70 milliGRAM(s)/deciliter  ondansetron Injectable 4 milliGRAM(s) IV Push every 6 hours PRN Nausea  oxyCODONE    IR 5 milliGRAM(s) Oral every 6 hours PRN Moderate Pain (4 - 6)  oxyCODONE    IR 10 milliGRAM(s) Oral every 6 hours PRN Severe Pain (7 - 10)    Vital Signs Last 24 Hrs  T(C): 36.6 (28 May 2022 12:00), Max: 36.6 (27 May 2022 20:19)  T(F): 97.8 (28 May 2022 12:00), Max: 97.8 (27 May 2022 20:19)  HR: 72 (28 May 2022 12:00) (72 - 81)  BP: 136/78 (28 May 2022 12:00) (127/74 - 155/85)  BP(mean): --  RR: 20 (28 May 2022 12:00) (18 - 20)  SpO2: 93% (28 May 2022 12:00) (92% - 99%)    PHYSICAL EXAM:  GENERAL: Comfortable, no acute distress   HEAD:  Normocephalic, atraumatic  EYES: EOMI, PERRLA  HEENT: Moist mucous membranes  NECK: Supple, No JVD  NERVOUS SYSTEM:  awake, alert, oriented x 4, 5/5 power of B/L upper and lower extremities  CHEST/LUNG: CTA, on 3 L NC  HEART: Regular rate and rhythm  ABDOMEN: Soft, morbidly obese, abd dsg intact,  lap sites c/d/i, BS+  GENITOURINARY: Voiding, no palpable bladder  EXTREMITIES:   No clubbing, cyanosis, or edema  MUSCULOSKELETAL- No muscle tenderness, no joint tenderness  SKIN-no rash    LABS:                                                 11.9   9.12  )-----------( 211      ( 28 May 2022 07:49 )             39.8     28 May 2022 07:49    138    |  104    |  12     ----------------------------<  152    4.4     |  33     |  1.00     Ca    9.1        28 May 2022 07:49  Phos  1.5       28 May 2022 07:49  Mg     2.4       28 May 2022 07:49    CAPILLARY BLOOD GLUCOSE  POCT Blood Glucose.: 197 mg/dL (28 May 2022 08:36)  POCT Blood Glucose.: 169 mg/dL (27 May 2022 20:43)  POCT Blood Glucose.: 146 mg/dL (27 May 2022 16:57)    IMPRESSION: 57 yo male with above pmh. a/w:    # h/o chronic diverticulitis, ileostomy 11/2021  # S/p reversal of ileostomy  CRS following  +flatus, +BM  Tolerating diet  Not in pain    #hypoxia likely due to sleep apnea  Pulmonary following  h/o non compliance with cpap  ABG improved  bipap prn at night  pt will need to f/u with Pulmonary as out pt    #hypophosphatemia  replete    #leukocytosis  resolved    #acute blood loss anemia  d/t surgery  no need for transfusions  monitor    #HTN  cont bb    #HLD  cont statin    #BPH  cont finasteride/flomax    #DM, type 2  BGM/ss  hold OHAS    #neuropathy  cont gabapentin    #VTE prophylaxis  lovenox  Venodynes  Amb    dispo: likely home today

## 2022-05-28 NOTE — PROGRESS NOTE ADULT - SUBJECTIVE AND OBJECTIVE BOX
Subjective:    pat awake & alert with improving oxygen.    Home Medications:  Admelog 100 units/mL injectable solution: Inject subcutaneously with meals per sliding scale as directed (25 May 2022 11:53)  Aspirin Enteric Coated 81 mg oral delayed release tablet: 1 tab(s) orally once a day (25 May 2022 11:53)  atorvastatin 40 mg oral tablet: 1 tab(s) orally once a day (at bedtime) (25 May 2022 11:53)  carvedilol 12.5 mg oral tablet: 2 tab(s) orally 2 times a day (25 May 2022 11:53)  finasteride: 1 tab(s) orally once a day (25 May 2022 11:53)  furosemide 40 mg oral tablet: 1 tab(s) orally once a day (25 May 2022 11:53)  gabapentin 400 mg oral tablet: orally 2 times a day (25 May 2022 11:53)  metFORMIN 500 mg oral tablet: 1 tab(s) orally 2 times a day (25 May 2022 11:53)  Semglee Prefilled Pen 100 units/mL subcutaneous solution: 50 unit(s) subcutaneously once a day (at bedtime) (25 May 2022 11:53)  tamsulosin 0.4 mg oral capsule: 1 cap(s) orally once a day (25 May 2022 11:53)  Trulicity Pen 1.5 mg/0.5 mL subcutaneous solution: subcutaneous once a week, saturday (25 May 2022 11:53)    MEDICATIONS  (STANDING):  aspirin enteric coated 81 milliGRAM(s) Oral daily  atorvastatin 40 milliGRAM(s) Oral at bedtime  carvedilol 25 milliGRAM(s) Oral every 12 hours  dextrose 5%. 1000 milliLiter(s) (50 mL/Hr) IV Continuous <Continuous>  dextrose 5%. 1000 milliLiter(s) (100 mL/Hr) IV Continuous <Continuous>  dextrose 50% Injectable 25 Gram(s) IV Push once  dextrose 50% Injectable 12.5 Gram(s) IV Push once  dextrose 50% Injectable 25 Gram(s) IV Push once  enoxaparin Injectable 40 milliGRAM(s) SubCutaneous every 24 hours  finasteride 5 milliGRAM(s) Oral daily  gabapentin 400 milliGRAM(s) Oral two times a day  glucagon  Injectable 1 milliGRAM(s) IntraMuscular once  insulin lispro (ADMELOG) corrective regimen sliding scale   SubCutaneous three times a day before meals  tamsulosin 0.4 milliGRAM(s) Oral at bedtime    MEDICATIONS  (PRN):  acetaminophen     Tablet .. 650 milliGRAM(s) Oral every 6 hours PRN Temp greater or equal to 38C (100.4F), Mild Pain (1 - 3)  dextrose Oral Gel 15 Gram(s) Oral once PRN Blood Glucose LESS THAN 70 milliGRAM(s)/deciliter  ondansetron Injectable 4 milliGRAM(s) IV Push every 6 hours PRN Nausea  oxyCODONE    IR 5 milliGRAM(s) Oral every 6 hours PRN Moderate Pain (4 - 6)  oxyCODONE    IR 10 milliGRAM(s) Oral every 6 hours PRN Severe Pain (7 - 10)      Allergies    No Known Allergies    Intolerances        Vital Signs Last 24 Hrs  T(C): 36.6 (27 May 2022 20:19), Max: 36.6 (27 May 2022 20:19)  T(F): 97.8 (27 May 2022 20:19), Max: 97.8 (27 May 2022 20:19)  HR: 81 (27 May 2022 20:19) (81 - 81)  BP: 145/82 (27 May 2022 20:19) (145/82 - 145/82)  BP(mean): --  RR: 18 (27 May 2022 20:19) (18 - 18)  SpO2: 99% (27 May 2022 20:19) (99% - 99%)      PHYSICAL EXAMINATION:    NECK:  Supple. No lymphadenopathy. Jugular venous pressure not elevated. Carotids equal.   HEART:   The cardiac impulse has a normal quality. Reg., Nl S1 and S2.  There are no murmurs, rubs or gallops noted  CHEST:  Chest crackles to auscultation. Normal respiratory effort.  ABDOMEN:  Soft and nontender.   EXTREMITIES:  There is no edema.       LABS:                        11.9   9.12  )-----------( 211      ( 28 May 2022 07:49 )             39.8     05-28    138  |  104  |  12  ----------------------------<  152<H>  4.4   |  33<H>  |  1.00    Ca    9.1      28 May 2022 07:49  Phos  1.5     05-28  Mg     2.4     05-28

## 2022-05-28 NOTE — DISCHARGE NOTE NURSING/CASE MANAGEMENT/SOCIAL WORK - NSDCPEFALRISK_GEN_ALL_CORE
For information on Fall & Injury Prevention, visit: [email or web address] OR  [email or web address] OR  https://www.cdc.gov/steadi/patient.html

## 2022-05-28 NOTE — PROGRESS NOTE ADULT - SUBJECTIVE AND OBJECTIVE BOX
Pt. seen and examined at bedside this morning. Patient denies fever, chest pain, SOB, nausea, vomiting, diarrhea or constipation. No acute events overnight, compliant BIPAP all night.    Vital Signs Last 24 Hrs  T(C): 36.6 (27 May 2022 20:19), Max: 36.6 (27 May 2022 20:19)  T(F): 97.8 (27 May 2022 20:19), Max: 97.8 (27 May 2022 20:19)  HR: 81 (27 May 2022 20:19) (81 - 81)  BP: 145/82 (27 May 2022 20:19) (145/82 - 145/82)  BP(mean): --  RR: 18 (27 May 2022 20:19) (18 - 18)  SpO2: 99% (27 May 2022 20:19) (99% - 99%)    Labs:  ABG - ( 27 May 2022 09:54 )  pH, Arterial: 7.34  pH, Blood: x     /  pCO2: 60    /  pO2: 83    / HCO3: 32    / Base Excess: 4.8   /  SaO2: 97                                          11.9   9.12  )-----------( 211      ( 28 May 2022 07:49 )             39.8     CBC Full  -  ( 28 May 2022 07:49 )  WBC Count : 9.12 K/uL  RBC Count : 4.69 M/uL  Hemoglobin : 11.9 g/dL  Hematocrit : 39.8 %  Platelet Count - Automated : 211 K/uL  Mean Cell Volume : 84.9 fl  Mean Cell Hemoglobin : 25.4 pg  Mean Cell Hemoglobin Concentration : 29.9 gm/dL  Auto Neutrophil # : x  Auto Lymphocyte # : x  Auto Monocyte # : x  Auto Eosinophil # : x  Auto Basophil # : x  Auto Neutrophil % : x  Auto Lymphocyte % : x  Auto Monocyte % : x  Auto Eosinophil % : x  Auto Basophil % : x    05-27    138  |  104  |  13  ----------------------------<  136<H>  4.4   |  31  |  0.96    Ca    8.8      27 May 2022 07:47  Phos  1.7     05-27  Mg     2.3     05-27    Physical Exam:  Pt is AAOx3  General: Well developed, in no acute distress.   Chest: Lungs clear, no rales, no rhonchi, no wheezes.   Heart: RR, no murmurs, no rubs, no gallops.   Abdomen: Soft, no tenderness, nondistended, no masses, BS normal. prevena wound vac removed incision c/d/i   Back: Normal curvature, no tenderness.   Neuro: Physiological, no localizing findings.   Skin: Normal, no rashes, no lesions noted.   Extremities: Warm, well perfused, no edema, Pulses intact

## 2022-05-28 NOTE — PROGRESS NOTE ADULT - ATTENDING COMMENTS
Seen and examined.  Remained on BIPAP overnight.  Was taken off of 02 this AM, rechecked to determine if home 02 was necessary.  Per RN, pt refusing to allow RA sats to be obtained as he states he is otherwise feeling fine.  NAD  dressing in place, soft, NT, no apparent distention  Labs reviewed  A/P -   RA sats 92%.  Will d/c home without home 02 and have patient followup with pulmonary for sleep study, BIPAP, RAFFI.  D/W Dr. Patterson.

## 2022-05-28 NOTE — DISCHARGE NOTE NURSING/CASE MANAGEMENT/SOCIAL WORK - PATIENT PORTAL LINK FT
You can access the FollowMyHealth Patient Portal offered by [facility] by registering at the following website: [email or web address]. By joining [email or web address] FollowMyHealth portal, you will also be able to view your health information using other applications (apps) compatible with our system.

## 2022-06-03 DIAGNOSIS — I10 ESSENTIAL (PRIMARY) HYPERTENSION: ICD-10-CM

## 2022-06-03 DIAGNOSIS — D62 ACUTE POSTHEMORRHAGIC ANEMIA: ICD-10-CM

## 2022-06-03 DIAGNOSIS — K43.5 PARASTOMAL HERNIA WITHOUT OBSTRUCTION OR GANGRENE: ICD-10-CM

## 2022-06-03 DIAGNOSIS — E11.40 TYPE 2 DIABETES MELLITUS WITH DIABETIC NEUROPATHY, UNSPECIFIED: ICD-10-CM

## 2022-06-03 DIAGNOSIS — Z43.2 ENCOUNTER FOR ATTENTION TO ILEOSTOMY: ICD-10-CM

## 2022-06-03 DIAGNOSIS — E66.01 MORBID (SEVERE) OBESITY DUE TO EXCESS CALORIES: ICD-10-CM

## 2022-06-03 DIAGNOSIS — G47.33 OBSTRUCTIVE SLEEP APNEA (ADULT) (PEDIATRIC): ICD-10-CM

## 2022-06-03 DIAGNOSIS — N40.0 BENIGN PROSTATIC HYPERPLASIA WITHOUT LOWER URINARY TRACT SYMPTOMS: ICD-10-CM

## 2022-06-03 DIAGNOSIS — J96.91 RESPIRATORY FAILURE, UNSPECIFIED WITH HYPOXIA: ICD-10-CM

## 2022-06-03 DIAGNOSIS — E87.2 ACIDOSIS: ICD-10-CM

## 2022-06-03 DIAGNOSIS — E78.5 HYPERLIPIDEMIA, UNSPECIFIED: ICD-10-CM

## 2022-06-03 DIAGNOSIS — Z91.19 PATIENT'S NONCOMPLIANCE WITH OTHER MEDICAL TREATMENT AND REGIMEN: ICD-10-CM

## 2022-06-03 DIAGNOSIS — F17.200 NICOTINE DEPENDENCE, UNSPECIFIED, UNCOMPLICATED: ICD-10-CM

## 2022-06-03 DIAGNOSIS — K57.20 DIVERTICULITIS OF LARGE INTESTINE WITH PERFORATION AND ABSCESS WITHOUT BLEEDING: ICD-10-CM

## 2022-06-03 DIAGNOSIS — E83.39 OTHER DISORDERS OF PHOSPHORUS METABOLISM: ICD-10-CM

## 2022-06-03 DIAGNOSIS — D72.829 ELEVATED WHITE BLOOD CELL COUNT, UNSPECIFIED: ICD-10-CM

## 2022-06-03 DIAGNOSIS — J96.92 RESPIRATORY FAILURE, UNSPECIFIED WITH HYPERCAPNIA: ICD-10-CM

## 2022-06-09 NOTE — CHART NOTE - NSCHARTNOTEFT_GEN_A_CORE
Patient became hypoxic to 88% O2 on room air yesterday postoperatively and was transferred to the telemetry unit for continuous pulse ox monitoring and oxygen therapy through venti mask. Surgery team was called because patient was refusing to wear his venti mask and refusing to keep the O2 monitor on. Patient felt he was being "forced" to wear it and was threatening to sign out AMA. Surgery team came to the bedside to speak with the patient and described the importance of the oxygen therapy given his desaturation. Was advised to keep the venti mask and O2 monitor on. Pulmonology Dr. Patterson was called to evaluate patient for treatment of destaturation in setting of pt with hx of RAFFI. Awaiting pulmonology consult recommendations.
Patient convinced to obtain oxygen saturation.   92% on room air no supplemental oxygen.   As per Pulmonologist, Dr. Patterson; acceptable to be d/c home with no supplemental O2 required.   Will inform case management of change and for nursing to d/c home today.   Thank you.
Clarification of brief op note from procedure on 5/25/22    Surgeon: Dr. Lagos  Co-surgeon: Dr. GENIA Altamirano   First Assistant:  JORGE Stacy  Second Assist Dr. Conn, resident

## 2022-06-15 ENCOUNTER — APPOINTMENT (OUTPATIENT)
Dept: COLORECTAL SURGERY | Facility: CLINIC | Age: 57
End: 2022-06-15
Payer: MEDICAID

## 2022-06-15 VITALS
TEMPERATURE: 97.3 F | HEART RATE: 80 BPM | BODY MASS INDEX: 44.43 KG/M2 | DIASTOLIC BLOOD PRESSURE: 100 MMHG | OXYGEN SATURATION: 94 % | RESPIRATION RATE: 14 BRPM | HEIGHT: 69 IN | SYSTOLIC BLOOD PRESSURE: 180 MMHG | WEIGHT: 300 LBS

## 2022-06-15 DIAGNOSIS — Z93.2 ILEOSTOMY STATUS: ICD-10-CM

## 2022-06-15 DIAGNOSIS — Z09 ENCOUNTER FOR FOLLOW-UP EXAMINATION AFTER COMPLETED TREATMENT FOR CONDITIONS OTHER THAN MALIGNANT NEOPLASM: ICD-10-CM

## 2022-06-15 DIAGNOSIS — E66.01 MORBID (SEVERE) OBESITY DUE TO EXCESS CALORIES: ICD-10-CM

## 2022-06-15 DIAGNOSIS — T81.31XD DISRUPTION OF EXTERNAL OPERATION (SURGICAL) WOUND, NOT ELSEWHERE CLASSIFIED, SUBSEQUENT ENCOUNTER: ICD-10-CM

## 2022-06-15 PROCEDURE — 99024 POSTOP FOLLOW-UP VISIT: CPT

## 2022-06-15 RX ORDER — AMOXICILLIN AND CLAVULANATE POTASSIUM 875; 125 MG/1; MG/1
875-125 TABLET, COATED ORAL TWICE DAILY
Qty: 14 | Refills: 0 | Status: ACTIVE | COMMUNITY
Start: 2022-06-15 | End: 1900-01-01

## 2022-06-15 NOTE — PROCEDURE
[FreeTextEntry1] : surgical wound was cauterized with silver nitrate to assist in granulation and healing

## 2022-06-15 NOTE — PHYSICAL EXAM
[Abdomen Masses] : No abdominal masses [Abdomen Tenderness] : ~T No ~M abdominal tenderness [Respiratory Effort] : normal respiratory effort [No Rash or Lesion] : No rash or lesion [Alert] : alert [Oriented to Person] : oriented to person [Oriented to Place] : oriented to place [Oriented to Time] : oriented to time [Calm] : calm [de-identified] : obese, surgical wound with [id number] dehiscence, clean, no erythema, serosanguineous drainage noted. all staples were removed.  [de-identified] : well appearing, no acute distress [de-identified] : RAJINDER x4 [de-identified] : warm and dry

## 2022-06-15 NOTE — HISTORY OF PRESENT ILLNESS
[FreeTextEntry1] : 56 year old male who presents to the office s/p reversal of ileostomy. Overall, doing well. Tolerating PO, ambulating, soft BMs. He does report that his ileostomy closure site opened and is drainage. He also reports having issue with ejaculation. Denies fever/chills, n/v.

## 2022-06-15 NOTE — ASSESSMENT
[FreeTextEntry1] : 56 year old s/p lap reversal of ileostomy.  Doing well. Surgical wound with [id number] dehiscence; all staples were removed. no s/s of infection. wound was cleansed with normal saline. cauterized with silver nitrate and packed with aquacel Ag.  Surgical pathology reviewed with patient. All questions/concerns addressed to his satisfaction.\par \par s/p reversal of ileostomy\par - regular diet\par - fiber, stool softeners as needed\par - keep incision clean and dry\par - daily showers with antibacterial soap\par - no heavy lifting for 4-6 more weeks\par - Home care referral requested for wound care/dressing change/wound assessment\par - wound care orders: cleanse wound with normal saline. pack dressing with Aquacel Ag rope. gauze/paper tape dressing on top. Dressing change 3 times per week.  Patient given starter supplies to help initiate care.\par \par RTO in 2 weeks for wound check. \par

## 2022-06-15 NOTE — DATA REVIEWED
[FreeTextEntry1] : surgical pathology reviewed - benign \par \par  Pathology             Final\par \par No Documents Attached\par \par \par \par \par   [facility] Accession Number : [id number]\par Patient:   KENZIE MULLINS\par \par \par Accession:                             [id number]\par \par Collected Date/Time:                   5/25/2022 14:37 EDT\par Received Date/Time:                    5/26/2022 09:18 EDT\par \par Surgical Pathology Report - Auth (Verified)\par \par Specimen(s) Submitted\par 1  Terminal ileum, ileostomy site, parastomal hernia sac\par 2  Hernia sac\par \par Final Diagnosis\par \par 1. Ileostomy site (terminal ileum and parastomal hernia sac):\par -Squamous and intestinal tissue with inflammation, vascular\par \par congestion and\par reactive changes.\par \par 2. Tissue (hernia sac):\par - Mesothelial lined fibroadipose tissue with patchy inflammation,\par organizing\par fat necrosis, vascular congestion and reactive changes..\par Verified by: JOLENE NAJERA M.D.\par (Electronic Signature)\par Reported on: 05/31/22 11:25 EDT, [address] [facility]\par Phone: [phone]   Fax: [phone]\par _________________________________________________________________\par \par \par Clinical Information\par Ileostomy, hx perforated sigmoid diverticulitis with abscess, morbid\par obesity\par \par Gross Description\par 1.  Received: In formalin labeled  "terminal ileum ileostomy site and\par parastomal hernia sac"\par Small bowel:  Double-lumen, 4.5 cm length    4 cm circumference\par Serosa:  Brown purple, dull, scant attached brown purple membranous tissue\par Mucosa:  Tan, bulging\par Skin:  Gray-white granular , 0.3-0.5 cm\par Submitted:  Representative sections in one cassette: 1A\par \par 2.  Received: In formalin labeled  "hernia sac"\par Size:  Multiple fragments measuring 4.8 x 3 x 2 cm in aggregate\par Description:  Gray pink to red somewhat membranous tissue fragments with\par scant attached adipose tissue\par Submitted:  Entirely in one cassette: 2A\par \par Roxy Small 05/26/2022 01:25 PM\par \par \par Disclaimer\par In addition to other data that may appear on the specimen containers, all\par labels have been inspected to confirm the presence of the patient's name\par and date of birth.\par NULL\par NO\par [phone]\par \par  \par \par  Ordered by: CHARLENE MADSEN       Collected/Examined: 25May2022 02:37PM       \par Verified by: ALVAREZ COLES 31May2022 02:39PM       \par  Result Communication: No patient communication needed at this time;\par Stage: Final       \par  Performed at: [facility]       Resulted: [id number] 11:25AM       Last Updated: 31May2022 02:39PM       Accession: [id number]

## 2022-06-28 ENCOUNTER — APPOINTMENT (OUTPATIENT)
Dept: COLORECTAL SURGERY | Facility: CLINIC | Age: 57
End: 2022-06-28

## 2022-09-08 NOTE — DATA REVIEWED
Initial Anesthesia Post-op Note    Patient: Alexis Tanner  Procedure(s) Performed: RIGHT URETEROSCOPY, RIGHT LITHOTRIPSY, USING LASER AND RIGHT INSERTION, STENT, URETER - RIGHTRIGHT LITHOTRIPSY, USING LASER - RIGHTRIGHT INSERTION, STENT, URETER - RIGHT  Anesthesia type: General    Vitals Value Taken Time   Temp 36.6 °C (97.8 °F) 09/08/22 0937   Pulse 68 09/08/22 0937   Resp 16 09/08/22 0937   SpO2 98 % 09/08/22 0937   /75 09/08/22 0937         Patient Location: PACU Phase 1  Post-op Vital Signs:stable  Level of Consciousness: participates in exam, awake and oriented  Respiratory Status: spontaneous ventilation, unassisted and face mask  Cardiovascular stable and blood pressure returned to baseline  Hydration: euvolemic  Pain Management: well controlled  Handoff: Handoff to receiving nurse was performed and questions were answered  Nausea: None  Airway Patency:patent  Post-op Assessment: no complications and patient tolerated procedure well with no complications  Comments: REPORT TO RN      No complications documented.  
[FreeTextEntry1] : ct scan shows diverticultis with abscess

## 2023-02-28 NOTE — H&P PST ADULT - ENDOCRINE
Pt requesting refill on One Touch Verio Flex Lancets     Unable to find on med list     Please advise    Store #[id number] details…

## 2023-07-13 NOTE — DISCHARGE NOTE PROVIDER - NSDCHHCONTACT_GEN_ALL_CORE_FT
Colonoscopy 1/9/2018:BB PS 9, sigmoid and descending diverticulosis, patent end-to-side ileocolonic anastomosis characterized by healthy-appearing mucosa, erythematous mucosa in the entire colon status post biopsy, removal of a benign-appearing 3 mm rectal polyp.  Colon biopsies demonstrated focal active colitis and rectal polyp was hyperplastic. As certified below, I, or a nurse practitioner or physician assistant working with me, had a face-to-face encounter that meets the physician face-to-face encounter requirements.

## 2023-07-20 ENCOUNTER — OFFICE (OUTPATIENT)
Dept: URBAN - METROPOLITAN AREA CLINIC 12 | Facility: CLINIC | Age: 58
Setting detail: OPHTHALMOLOGY
End: 2023-07-20
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
## 2023-07-20 PROCEDURE — NO SHOW FE NO SHOW FEE: Performed by: STUDENT IN AN ORGANIZED HEALTH CARE EDUCATION/TRAINING PROGRAM

## 2023-07-27 ENCOUNTER — RX ONLY (RX ONLY)
Age: 58
End: 2023-07-27

## 2023-07-27 ENCOUNTER — OFFICE (OUTPATIENT)
Dept: URBAN - METROPOLITAN AREA CLINIC 100 | Facility: CLINIC | Age: 58
Setting detail: OPHTHALMOLOGY
End: 2023-07-27
Payer: COMMERCIAL

## 2023-07-27 DIAGNOSIS — H01.001: ICD-10-CM

## 2023-07-27 DIAGNOSIS — H01.004: ICD-10-CM

## 2023-07-27 DIAGNOSIS — H40.051: ICD-10-CM

## 2023-07-27 DIAGNOSIS — H00.14: ICD-10-CM

## 2023-07-27 PROCEDURE — 92285 EXTERNAL OCULAR PHOTOGRAPHY: CPT | Performed by: OPHTHALMOLOGY

## 2023-07-27 PROCEDURE — 67800 REMOVE EYELID LESION: CPT | Performed by: OPHTHALMOLOGY

## 2023-07-27 PROCEDURE — 92012 INTRM OPH EXAM EST PATIENT: CPT | Performed by: OPHTHALMOLOGY

## 2023-07-27 ASSESSMENT — CONFRONTATIONAL VISUAL FIELD TEST (CVF)
OS_FINDINGS: FULL
OD_FINDINGS: FULL

## 2023-07-27 ASSESSMENT — TONOMETRY: OS_IOP_MMHG: 20

## 2023-07-27 ASSESSMENT — VISUAL ACUITY
OS_BCVA: 20/40
OD_BCVA: 20/40

## 2024-01-04 ENCOUNTER — OFFICE (OUTPATIENT)
Dept: URBAN - METROPOLITAN AREA CLINIC 100 | Facility: CLINIC | Age: 59
Setting detail: OPHTHALMOLOGY
End: 2024-01-04
Payer: COMMERCIAL

## 2024-01-04 DIAGNOSIS — H01.001: ICD-10-CM

## 2024-01-04 DIAGNOSIS — H01.004: ICD-10-CM

## 2024-01-04 DIAGNOSIS — H00.14: ICD-10-CM

## 2024-01-04 DIAGNOSIS — D48.7: ICD-10-CM

## 2024-01-04 PROCEDURE — 92285 EXTERNAL OCULAR PHOTOGRAPHY: CPT | Performed by: OPHTHALMOLOGY

## 2024-01-04 PROCEDURE — 92012 INTRM OPH EXAM EST PATIENT: CPT | Mod: 25 | Performed by: OPHTHALMOLOGY

## 2024-01-04 PROCEDURE — 67840 REMOVE EYELID LESION: CPT | Mod: E1 | Performed by: OPHTHALMOLOGY

## 2024-01-04 ASSESSMENT — CONFRONTATIONAL VISUAL FIELD TEST (CVF)
OD_FINDINGS: FULL
OS_FINDINGS: FULL

## 2024-03-27 NOTE — PATIENT PROFILE ADULT - ARE YOU PART OF A CLINICAL TRIAL?
Medication: crestor passed protocol.   Last office visit date:11/29/23  Next appointment scheduled?: Yes   Number of refills given: 1   no
